# Patient Record
Sex: MALE | Race: WHITE | NOT HISPANIC OR LATINO | URBAN - METROPOLITAN AREA
[De-identification: names, ages, dates, MRNs, and addresses within clinical notes are randomized per-mention and may not be internally consistent; named-entity substitution may affect disease eponyms.]

---

## 2019-04-03 ENCOUNTER — OUTPATIENT (OUTPATIENT)
Dept: OUTPATIENT SERVICES | Facility: HOSPITAL | Age: 44
LOS: 1 days | End: 2019-04-03
Payer: MEDICARE

## 2019-04-03 ENCOUNTER — APPOINTMENT (OUTPATIENT)
Dept: RADIOLOGY | Facility: CLINIC | Age: 44
End: 2019-04-03
Payer: MEDICARE

## 2019-04-03 DIAGNOSIS — Z00.00 ENCOUNTER FOR GENERAL ADULT MEDICAL EXAMINATION WITHOUT ABNORMAL FINDINGS: ICD-10-CM

## 2019-04-03 PROCEDURE — 71046 X-RAY EXAM CHEST 2 VIEWS: CPT | Mod: 26

## 2019-04-03 PROCEDURE — 71046 X-RAY EXAM CHEST 2 VIEWS: CPT

## 2019-07-08 ENCOUNTER — OUTPATIENT (OUTPATIENT)
Dept: OUTPATIENT SERVICES | Facility: HOSPITAL | Age: 44
LOS: 1 days | End: 2019-07-08
Payer: MEDICARE

## 2019-07-08 ENCOUNTER — APPOINTMENT (OUTPATIENT)
Dept: ULTRASOUND IMAGING | Facility: CLINIC | Age: 44
End: 2019-07-08
Payer: MEDICARE

## 2019-07-08 DIAGNOSIS — Z00.8 ENCOUNTER FOR OTHER GENERAL EXAMINATION: ICD-10-CM

## 2019-07-08 PROCEDURE — 76870 US EXAM SCROTUM: CPT | Mod: 26

## 2019-07-08 PROCEDURE — 76870 US EXAM SCROTUM: CPT

## 2020-06-03 ENCOUNTER — APPOINTMENT (OUTPATIENT)
Dept: FAMILY MEDICINE | Facility: CLINIC | Age: 45
End: 2020-06-03
Payer: MEDICARE

## 2020-06-03 PROCEDURE — 99441: CPT | Mod: 95

## 2021-03-09 ENCOUNTER — APPOINTMENT (OUTPATIENT)
Dept: FAMILY MEDICINE | Facility: CLINIC | Age: 46
End: 2021-03-09
Payer: MEDICARE

## 2021-03-09 VITALS
TEMPERATURE: 97.8 F | HEIGHT: 67 IN | HEART RATE: 82 BPM | RESPIRATION RATE: 12 BRPM | BODY MASS INDEX: 38.61 KG/M2 | OXYGEN SATURATION: 98 % | WEIGHT: 246 LBS

## 2021-03-09 DIAGNOSIS — Z56.0 UNEMPLOYMENT, UNSPECIFIED: ICD-10-CM

## 2021-03-09 DIAGNOSIS — Z82.62 FAMILY HISTORY OF OSTEOPOROSIS: ICD-10-CM

## 2021-03-09 DIAGNOSIS — Z82.49 FAMILY HISTORY OF ISCHEMIC HEART DISEASE AND OTHER DISEASES OF THE CIRCULATORY SYSTEM: ICD-10-CM

## 2021-03-09 DIAGNOSIS — Z80.42 FAMILY HISTORY OF MALIGNANT NEOPLASM OF PROSTATE: ICD-10-CM

## 2021-03-09 DIAGNOSIS — Z81.8 FAMILY HISTORY OF OTHER MENTAL AND BEHAVIORAL DISORDERS: ICD-10-CM

## 2021-03-09 PROCEDURE — G0447 BEHAVIOR COUNSEL OBESITY 15M: CPT | Mod: 59

## 2021-03-09 PROCEDURE — 99214 OFFICE O/P EST MOD 30 MIN: CPT

## 2021-03-09 SDOH — ECONOMIC STABILITY - INCOME SECURITY: UNEMPLOYMENT, UNSPECIFIED: Z56.0

## 2021-03-10 NOTE — HEALTH RISK ASSESSMENT
[No] : In the past 12 months have you used drugs other than those required for medical reasons? No [No falls in past year] : Patient reported no falls in the past year [0] : 2) Feeling down, depressed, or hopeless: Not at all (0) [] : No [de-identified] : stopped 4 years ago [de-identified] : walking [de-identified] : no

## 2021-03-10 NOTE — ASSESSMENT
[FreeTextEntry1] : Hypertension:  Continue with Lisinopril 10 mg daily, monitor b/p, bring record.  \par \par Hyperlipidemia:  Continue with medications Lopid 600 mg twice/day.  Labs: lipid pane.\par \par DM:  Continue with Medications:  Janumet XR 50 mg-1000 mg, twice/day,  monitor BS, bring record.  \par \par Obesity::  Discussed diet and exercise,  discussed the risk of obesity and the benefits of weight lost.  Encouraged to eat smaller portion, 3-4 times/day, keep food diary, weight self weekly,  increase physical activities.\par \par HM:  Discussed Life style modification, healthy diet, low salts, fats, sweets, less juices/sodas, fried food, cheese, butter.  More water, fruits, vegetables.   discussed the risk of obesity and the benefits of weight lost.  Encouraged to eat smaller portion, 3-4 times/day, keep food diary, weight self weekly,  increase physical activities.  Exercise/walking, running 30-60 minutes/day.  Encouraged to get plenty of rest, sleep 8 hours/night.  Encouraged frequent hands washing, keep social distances, wear mask.   Discussed dental hygiene, brush/floss after each meals, dental check every 6 months.  Annual eye check.  Encouraged to follow up with specialist.  Labs requisition given for: cbc with diff, cmp, lipids, tsh free t4, hgbA1c.\par \par \par Depression/Anxiety:  Continue to f/u with psych/therapist, continue to

## 2021-03-10 NOTE — PLAN
[FreeTextEntry1] : Hypertension:  Continue with Lisinopril 10 mg daily, monitor b/p, bring record.  \par \par Hyperlipidemia:  Continue with medications Lopid 600 mg twice/day.  Labs: lipid pane.\par \par DM:  Continue with Medications:  Janumet XR 50 mg-1000 mg, twice/day,  monitor BS, bring record.  \par \par Obesity::  Discussed diet and exercise,  discussed the risk of obesity and the benefits of weight lost.  Encouraged to eat smaller portion, 3-4 times/day, keep food diary, weight self weekly,  increase physical activities.\par \par HM:  Discussed Life style modification, healthy diet, low salts, fats, sweets, less juices/sodas, fried food, cheese, butter.  More water, fruits, vegetables.   discussed the risk of obesity and the benefits of weight lost.  Encouraged to eat smaller portion, 3-4 times/day, keep food diary, weight self weekly,  increase physical activities.  Exercise/walking, running 30-60 minutes/day.  Encouraged to get plenty of rest, sleep 8 hours/night.  Encouraged frequent hands washing, keep social distances, wear mask.   Discussed dental hygiene, brush/floss after each meals, dental check every 6 months.  Annual eye check.  Encouraged to follow up with specialist.  Labs requisition given for: cbc with diff, cmp, lipids, tsh free t4, hgbA1c.\par \par \par Depression/Anxiety:  Continue to f/u with psych/therapist, continue with psych medications.\par \par F/U in 10-12 weeks or prn

## 2021-03-10 NOTE — COUNSELING
[Behavioral health counseling provided] : Behavioral health counseling provided [Sleep ___ hours/day] : Sleep [unfilled] hours/day [Engage in a relaxing activity] : Engage in a relaxing activity [Plan in advance] : Plan in advance [Potential consequences of obesity discussed] : Potential consequences of obesity discussed [Benefits of weight loss discussed] : Benefits of weight loss discussed [Structured Weight Management Program suggested:] : Structured weight management program suggested [Encouraged to maintain food diary] : Encouraged to maintain food diary [Encouraged to increase physical activity] : Encouraged to increase physical activity [Encouraged to use exercise tracking device] : Encouraged to use exercise tracking device [Target Wt Loss Goal ___] : Weight Loss Goals: Target weight loss goal [unfilled] lbs [Weigh Self Weekly] : weigh self weekly [Decrease Portions] : decrease portions [____ min/wk Activity] : [unfilled] min/wk activity [Keep Food Diary] : keep food diary [Patient motivation] : Patient motivation [Needs reinforcement, provided] : Patient needs reinforcement on understanding of lifestyle changes and steps needed to achieve self management goal; reinforcement was provided [FreeTextEntry4] : 15

## 2021-03-10 NOTE — REVIEW OF SYSTEMS
[Fever] : no fever [Chills] : no chills [Fatigue] : no fatigue [Hot Flashes] : no hot flashes [Night Sweats] : no night sweats [Recent Change In Weight] : ~T no recent weight change [Discharge] : no discharge [Pain] : no pain [Redness] : no redness [Dryness] : no dryness [Vision Problems] : no vision problems [Itching] : no itching [Earache] : no earache [Hearing Loss] : no hearing loss [Nosebleeds] : no nosebleeds [Postnasal Drip] : no postnasal drip [Nasal Discharge] : no nasal discharge [Sore Throat] : no sore throat [Hoarseness] : no hoarseness [Chest Pain] : no chest pain [Palpitations] : no palpitations [Claudication] : no  leg claudication [Lower Ext Edema] : no lower extremity edema [Orthopena] : no orthopnea [Paroxysmal Nocturnal Dyspnea] : no paroxysmal nocturnal dyspnea [Shortness Of Breath] : no shortness of breath [Wheezing] : no wheezing [Cough] : no cough [Dyspnea on Exertion] : not dyspnea on exertion [Abdominal Pain] : no abdominal pain [Nausea] : no nausea [Constipation] : no constipation [Diarrhea] : no diarrhea [Vomiting] : no vomiting [Heartburn] : no heartburn [Melena] : no melena [Dysuria] : no dysuria [Incontinence] : no incontinence [Hesitancy] : no hesitancy [Nocturia] : no nocturia [Hematuria] : no hematuria [Frequency] : no frequency [Impotence] : no impotency [Poor Libido] : libido not poor [Joint Pain] : no joint pain [Joint Stiffness] : no joint stiffness [Muscle Pain] : no muscle pain [Muscle Weakness] : no muscle weakness [Back Pain] : no back pain [Joint Swelling] : no joint swelling [Mole Changes] : no mole changes [Nail Changes] : no nail changes [Hair Changes] : no hair changes [Skin Rash] : no skin rash [Headache] : no headache [Dizziness] : no dizziness [Fainting] : no fainting [Confusion] : no confusion [Unsteady Walk] : no ataxia [Memory Loss] : no memory loss [Suicidal] : not suicidal [Insomnia] : no insomnia [Anxiety] : no anxiety [Depression] : no depression [Easy Bleeding] : no easy bleeding [Easy Bruising] : no easy bruising [Swollen Glands] : no swollen glands

## 2021-03-10 NOTE — HISTORY OF PRESENT ILLNESS
[FreeTextEntry1] : Follow up visit [de-identified] : 45 y.o male with PMHx of DM, dyslipidemia, HTN, bipolar disorder,  schizophrenia.  Here today for f/u visit and to discuss labs.  Reported felling fine at this time.  Taking medication everyday, took meds today.  Checking BS, ranging 110 to 160.  Not checking B/P.  Reported f/u with endocrinologist 3 months ago, next appoint 3/26/21.  Also f/u with psych once a month.  Denies cp, palpitation, sob, dizziness, edema.   Denies polyphagia, polyuria, polydipsia.

## 2021-07-27 ENCOUNTER — NON-APPOINTMENT (OUTPATIENT)
Age: 46
End: 2021-07-27

## 2022-04-07 ENCOUNTER — NON-APPOINTMENT (OUTPATIENT)
Age: 47
End: 2022-04-07

## 2022-08-08 ENCOUNTER — APPOINTMENT (OUTPATIENT)
Dept: FAMILY MEDICINE | Facility: CLINIC | Age: 47
End: 2022-08-08

## 2022-08-08 VITALS
DIASTOLIC BLOOD PRESSURE: 78 MMHG | WEIGHT: 254 LBS | RESPIRATION RATE: 12 BRPM | HEIGHT: 67 IN | OXYGEN SATURATION: 97 % | HEART RATE: 82 BPM | SYSTOLIC BLOOD PRESSURE: 132 MMHG | BODY MASS INDEX: 39.87 KG/M2 | TEMPERATURE: 98.2 F

## 2022-08-08 DIAGNOSIS — Z00.00 ENCOUNTER FOR GENERAL ADULT MEDICAL EXAMINATION W/OUT ABNORMAL FINDINGS: ICD-10-CM

## 2022-08-08 PROCEDURE — 99214 OFFICE O/P EST MOD 30 MIN: CPT | Mod: 25

## 2022-08-08 PROCEDURE — G0447 BEHAVIOR COUNSEL OBESITY 15M: CPT | Mod: 59

## 2022-08-09 NOTE — HISTORY OF PRESENT ILLNESS
[FreeTextEntry1] : Follow up visit [de-identified] : 47 y.o male with PMHx of DM, dyslipidemia, HTN, bipolar disorder,  schizophrenia.  Here today for f/u visit.  Reported having lower back pain for several months, worse lately, also having left shoulder pain.  Pain rating 3-6/10, worse after lifting and carrying grocery from the market and walk for 45-50 minutes.  Reported not taking any meds for the pain at this time.  Reported Taking medication everyday, took meds today.  Checking BS, ranging 120 to 200.  Not checking B/P.  Reported f/u with endocrinologist 4-5 months ago, next appoint tomorrow 8/9/22.  Also f/u with psych once a month.  Denies cp, palpitation, sob, dizziness, edema.   Denies polyphagia, polyuria, polydipsia.

## 2022-08-09 NOTE — PLAN
[FreeTextEntry1] : Lower back pain:  Ibuprofen 600 mg every 8 hours prn.  Encouraged to alternate heating pads and cold compress 20 minutes each, 2-3 times/day. Encouraged Joints/back exercise 1-2 times/day.\par \par Hypertension:  Continue with Lisinopril 10 mg daily, monitor b/p, bring record.  \par \par Hyperlipidemia:  Continue with medications Lopid 600 mg twice/day.  Labs: lipid pane.\par \par DM:  Continue with Medications:  Janumet XR 50 mg-1000 mg, twice/day,  monitor BS, bring record.  \par \par Obesity::  Discussed diet and exercise,  discussed the risk of obesity and the benefits of weight lost.  Encouraged to eat smaller portion, 3-4 times/day, keep food diary, weight self weekly,  increase physical activities.\par \par HM:  Discussed Life style modification, healthy diet, low salts, fats, sweets, less juices/sodas, fried food, cheese, butter.  More water, fruits, vegetables.   discussed the risk of obesity and the benefits of weight lost.  Encouraged to eat smaller portion, 3-4 times/day, keep food diary, weight self weekly,  increase physical activities.  Exercise/walking, running 30-60 minutes/day.  Encouraged to get plenty of rest, sleep 8 hours/night.  Encouraged frequent hands washing, keep social distances, wear mask.   Discussed dental hygiene, brush/floss after each meals, dental check every 6 months.  Annual eye check.  Encouraged to follow up with specialist.  Labs requisition given for: cbc with diff, cmp, lipids, tsh free t4, hgbA1c.\par \par Depression/Anxiety:  Continue to f/u with psych/therapist, continue with psych medications.\par \par F/U in 10-12 weeks or prn

## 2022-08-09 NOTE — ASSESSMENT
[FreeTextEntry1] : Lower back and shoulder pain:  Ibuprofen 600 mg every 8 hours prn, Encouraged to alternate heating pads and cold compress 20 minutes each, 2-3 times/day. Encouraged Joints exercise 1-2 times/day.\par \par Hypertension:  Continue with Lisinopril 10 mg daily, monitor b/p, bring record.  \par \par Hyperlipidemia:  Continue with medications Lopid 600 mg twice/day.  Labs: lipid pane.\par \par DM:  Continue with Medications:  Janumet XR 50 mg-1000 mg, twice/day,  monitor BS, bring record.  \par \par Obesity::  Discussed diet and exercise,  discussed the risk of obesity and the benefits of weight lost.  Encouraged to eat smaller portion, 3-4 times/day, keep food diary, weight self weekly,  increase physical activities.\par \par HM:  Discussed Life style modification, healthy diet, low salts, fats, sweets, less juices/sodas, fried food, cheese, butter.  More water, fruits, vegetables.   discussed the risk of obesity and the benefits of weight lost.  Encouraged to eat smaller portion, 3-4 times/day, keep food diary, weight self weekly,  increase physical activities.  Exercise/walking, running 30-60 minutes/day.  Encouraged to get plenty of rest, sleep 8 hours/night.  Encouraged frequent hands washing, keep social distances, wear mask.   Discussed dental hygiene, brush/floss after each meals, dental check every 6 months.  Annual eye check.  Encouraged to follow up with specialist.  Labs requisition given for: cbc with diff, cmp, lipids, tsh free t4, hgbA1c.\par \par \par Depression/Anxiety:  Continue to f/u with psych/therapist, continue with medications.\par \par Follow up in 12-14 weeks or prn.\par

## 2022-08-09 NOTE — HEALTH RISK ASSESSMENT
[No] : In the past 12 months have you used drugs other than those required for medical reasons? No [No falls in past year] : Patient reported no falls in the past year [0] : 2) Feeling down, depressed, or hopeless: Not at all (0) [With Patient/Caregiver] : , with patient/caregiver [de-identified] : stopped 4 years ago [de-identified] : walking [de-identified] : no [AdvancecareDate] : 8/8/22

## 2023-05-11 ENCOUNTER — NON-APPOINTMENT (OUTPATIENT)
Age: 48
End: 2023-05-11

## 2023-05-11 ENCOUNTER — APPOINTMENT (OUTPATIENT)
Dept: FAMILY MEDICINE | Facility: CLINIC | Age: 48
End: 2023-05-11
Payer: MEDICARE

## 2023-05-11 VITALS
OXYGEN SATURATION: 95 % | HEIGHT: 67 IN | TEMPERATURE: 98 F | DIASTOLIC BLOOD PRESSURE: 88 MMHG | BODY MASS INDEX: 39.87 KG/M2 | WEIGHT: 254 LBS | RESPIRATION RATE: 14 BRPM | HEART RATE: 84 BPM | SYSTOLIC BLOOD PRESSURE: 142 MMHG

## 2023-05-11 DIAGNOSIS — F31.10 BIPOLAR DISORDER, CURRENT EPISODE MANIC W/OUT PSYCHOTIC FEATURES, UNSPECIFIED: ICD-10-CM

## 2023-05-11 DIAGNOSIS — L30.8 OTHER SPECIFIED DERMATITIS: ICD-10-CM

## 2023-05-11 DIAGNOSIS — Z86.79 PERSONAL HISTORY OF OTHER DISEASES OF THE CIRCULATORY SYSTEM: ICD-10-CM

## 2023-05-11 DIAGNOSIS — Z86.39 PERSONAL HISTORY OF OTHER ENDOCRINE, NUTRITIONAL AND METABOLIC DISEASE: ICD-10-CM

## 2023-05-11 PROCEDURE — G0439: CPT

## 2023-05-11 NOTE — REVIEW OF SYSTEMS
no [Diarrhea] : diarrhea [Heartburn] : heartburn [Back Pain] : back pain [Negative] : Heme/Lymph [Abdominal Pain] : no abdominal pain [Nausea] : no nausea [Constipation] : no constipation [Vomiting] : no vomiting [Melena] : no melena [Joint Pain] : no joint pain [Joint Stiffness] : no joint stiffness [Muscle Pain] : no muscle pain [Muscle Weakness] : no muscle weakness [Joint Swelling] : no joint swelling [Itching] : no itching [Mole Changes] : no mole changes [Nail Changes] : no nail changes [Hair Changes] : no hair changes [Skin Rash] : no skin rash [FreeTextEntry3] : wears glasses [de-identified] : stretch marks and chafing to inner thighs

## 2023-05-11 NOTE — COUNSELING
[Fall prevention counseling provided] : Fall prevention counseling provided [Adequate lighting] : Adequate lighting [No throw rugs] : No throw rugs [Use proper foot wear] : Use proper foot wear [Behavioral health counseling provided] : Behavioral health counseling provided [Sleep ___ hours/day] : Sleep [unfilled] hours/day [Engage in a relaxing activity] : Engage in a relaxing activity [Plan in advance] : Plan in advance [Potential consequences of obesity discussed] : Potential consequences of obesity discussed [Benefits of weight loss discussed] : Benefits of weight loss discussed [Encouraged to maintain food diary] : Encouraged to maintain food diary [Encouraged to increase physical activity] : Encouraged to increase physical activity [Target Wt Loss Goal ___] : Weight Loss Goals: Target weight loss goal [unfilled] lbs [Weigh Self Weekly] : weigh self weekly [Decrease Portions] : decrease portions [____ min/wk Activity] : [unfilled] min/wk activity [Patient motivation] : Patient motivation [Good understanding] : Patient has a good understanding of lifestyle changes and steps needed to achieve self management goal

## 2023-05-11 NOTE — PHYSICAL EXAM
[Normal] : affect was normal and insight and judgment were intact [de-identified] : obese abdomen [de-identified] : Striae noted to inner thigh w/redness, mild moisture associated dermatitius noted

## 2023-05-11 NOTE — HISTORY OF PRESENT ILLNESS
[FreeTextEntry1] : Physical Exam and Med Refill [de-identified] : 47 y.o male with PMHx of DM, dyslipidemia, HTN, bipolar disorder, schizophrenia and obesity presents to clinic for annual physical exam and medication refill. Pt does not recall last physical and states he ran out of med about 2-3 months ago. [facility] Endocrinologist supplied 30 day supply but that ran out about 2 months ago and he been w/o since. Denies any HA, dizziness, cp, n/v, fatigue, visual disturbances.\par Pt concerned about irritation to stretch marks to inner thigh near groin area as he is experiencing discomfort and is afraid it will bleed again. States that previous provider, ISADORA An prescribed Lotrimin ointment and would like rx. Denies any active bleed, skin breakdown, itching, odor, blistering, sores.\[facility] Pt also reports he is still experiencing loose stool and urgency upon arousal w/epigastric pain to lt upper abdomen. Denies heart burn, cp, palpitations, belching, gas pain, intolerance of food, flank pain, bloody/black/tarry stool, incontinence of stool/urine.  Pt state she chews about 20 pieces of Nicotine gum/day and eats at night prior to bed-usually peanut butter and jelly sandwich and beef kerry or cheerios/grain cereal. States this has been occurring for about 3 years. Reports he believes it is r/t hernia repair near groin as they cut rt testicle as he also is experiencing ED. Pt also reports occasional sensation of pulled and strain to groin area and believes occasional ED is r/t to use of Cogentin and Prolixin prescribed for Tremors in which he notified psych.\[facility] Pt is also c/o lower back pain occasionally, denies at this time. Rufino any numbness, tingling, muscle spasms/cramping, inability to bear full wt on extremity. States he has no Leobardo left nor takes anything OTC, did not use heat or cold therapy nor perform stretching exercises advised by ISADORA An. States it occurs when walking long periods of time, over 3 hours. Reports he does not alternate running shoes daily but has several pairs available. States mattress is about 3 yrs old.\par Denies any further concerns or requests.

## 2023-05-11 NOTE — HEALTH RISK ASSESSMENT
[Good] : ~his/her~  mood as  good [No] : In the past 12 months have you used drugs other than those required for medical reasons? No [No falls in past year] : Patient reported no falls in the past year [0] : 2) Feeling down, depressed, or hopeless: Not at all (0) [PHQ-2 Negative - No further assessment needed] : PHQ-2 Negative - No further assessment needed [Patient declined Retinal Exam] : Patient declined Retinal Exam. [Patient declined bone density test] : Patient declined bone density test [Patient declined colonoscopy] : Patient declined colonoscopy [HIV Test offered] : HIV Test offered [Hepatitis C test offered] : Hepatitis C test offered [None] : None [# of Members in Household ___] :  household currently consist of [unfilled] member(s) [On disability] : on disability [High School] : high school [Single] : single [Feels Safe at Home] : Feels safe at home [Fully functional (bathing, dressing, toileting, transferring, walking, feeding)] : Fully functional (bathing, dressing, toileting, transferring, walking, feeding) [Fully functional (using the telephone, shopping, preparing meals, housekeeping, doing laundry, using] : Fully functional and needs no help or supervision to perform IADLs (using the telephone, shopping, preparing meals, housekeeping, doing laundry, using transportation, managing medications and managing finances) [Smoke Detector] : smoke detector [Seat Belt] :  uses seat belt [Sunscreen] : uses sunscreen [Patient/Caregiver unclear of wishes] : , patient/caregiver unclear of wishes [Former] : Former [20 or more] : 20 or more [< 15 Years] : < 15 Years [FreeTextEntry1] : Stretch Marks, Lower Back Pain, Loose Stool upon Arousal [de-identified] : None [de-identified] : Therapist, Endocrine, Podiatry [de-identified] : Walk, Bike Ride [de-identified] : Not that Great-Bread, Canned Food, Carbs [[id number]] : 0 [Change in mental status noted] : No change in mental status noted [Language] : denies difficulty with language [Behavior] : denies difficulty with behavior [Learning/Retaining New Information] : denies difficulty learning/retaining new information [Handling Complex Tasks] : denies difficulty handling complex tasks [Reasoning] : denies difficulty with reasoning [Spatial Ability and Orientation] : denies difficulty with spatial ability and orientation [Sexually Active] : not sexually active [High Risk Behavior] : no high risk behavior [Reports changes in hearing] : Reports no changes in hearing [Reports changes in vision] : Reports no changes in vision [Reports normal functional visual acuity (ie: able to read med bottle)] : Reports poor functional visual acuity.  [Reports changes in dental health] : Reports no changes in dental health [Carbon Monoxide Detector] : no carbon monoxide detector [Guns at Home] : no guns at home [Travel to Developing Areas] : does not  travel to developing areas [TB Exposure] : is not being exposed to tuberculosis [Caregiver Concerns] : does not have caregiver concerns [MammogramComments] : male pt [PapSmearComments] : male pt [ColonoscopyComments] : will consider, referral for GI [de-identified] : Supportive Housing-roommates [de-identified] : GED [de-identified] : Wears glasses,last exam 6/2022

## 2023-05-11 NOTE — PLAN
[FreeTextEntry1] : CPE: Done\par \par Labs: Ordered and lab requisition provided-will review when results available\par \par Meds: Refills sent; pt instructed to contact clinic when running low to ensure proper refill and avoid lapse-continue as directed\par \par Moisture Associated Dermatitis: Cleanse area and pat dry, Apply Nystatin BID as needed; keep cool and Dry\par \par Loose Stool/Epigastric Pain: Referral for GI placed and list provided; advised to decrease gum throughout the day; Discussed alternate supper options; Discussed s/s of emergent medical issues-pt aware when to go to ED\par \par Low Back Pain: Take Motrin prn as directed; Alternated sneakers daily; Discussed resting in btw activities; Incorporate stretching exercises; alternate heat and cold as tolerated\par \par ED/testicle Strain sensation: Referral to Urology ordered and list provided; discussed when to go to ED; labs ordered for PSA and testosterone; discuss w/psych med s/e\par \par Psych: Continue meds nad session as scheduled\par \par H/M; Wt loss discussed; Continue appt w/specialists as scheduled; Schedule vision and dentist appt\par \par F/u w/in 12-16 wk  and prn\par \par \par

## 2023-06-01 ENCOUNTER — NON-APPOINTMENT (OUTPATIENT)
Age: 48
End: 2023-06-01

## 2023-06-05 ENCOUNTER — APPOINTMENT (OUTPATIENT)
Dept: UROLOGY | Facility: CLINIC | Age: 48
End: 2023-06-05
Payer: MEDICARE

## 2023-06-05 VITALS
SYSTOLIC BLOOD PRESSURE: 149 MMHG | TEMPERATURE: 98.1 F | WEIGHT: 250 LBS | HEIGHT: 67 IN | HEART RATE: 63 BPM | BODY MASS INDEX: 39.24 KG/M2 | OXYGEN SATURATION: 98 % | RESPIRATION RATE: 16 BRPM | DIASTOLIC BLOOD PRESSURE: 84 MMHG

## 2023-06-05 DIAGNOSIS — R68.82 DECREASED LIBIDO: ICD-10-CM

## 2023-06-05 DIAGNOSIS — Z87.891 PERSONAL HISTORY OF NICOTINE DEPENDENCE: ICD-10-CM

## 2023-06-05 PROCEDURE — 99204 OFFICE O/P NEW MOD 45 MIN: CPT

## 2023-06-05 RX ORDER — LISINOPRIL 10 MG/1
10 TABLET ORAL
Refills: 0 | Status: DISCONTINUED | COMMUNITY
End: 2023-06-05

## 2023-06-05 RX ORDER — GEMFIBROZIL 600 MG/1
600 TABLET, FILM COATED ORAL
Refills: 0 | Status: DISCONTINUED | COMMUNITY
End: 2023-06-05

## 2023-06-05 NOTE — LETTER BODY
[Dear  ___] : Dear  [unfilled], [Courtesy Letter:] : I had the pleasure of seeing your patient, [unfilled], in my office today. [Please see my note below.] : Please see my note below. [Sincerely,] : Sincerely, [FreeTextEntry3] : Ed\par \par César Wasserman MD\par [facility]\par  of Urology\par [employer] at [facility]\par

## 2023-06-05 NOTE — PHYSICAL EXAM
[General Appearance - Well Developed] : well developed [General Appearance - Well Nourished] : well nourished [Normal Appearance] : normal appearance [Well Groomed] : well groomed [General Appearance - In No Acute Distress] : no acute distress [Edema] : no peripheral edema [Respiration, Rhythm And Depth] : normal respiratory rhythm and effort [Exaggerated Use Of Accessory Muscles For Inspiration] : no accessory muscle use [Abdomen Soft] : soft [Abdomen Tenderness] : non-tender [Costovertebral Angle Tenderness] : no ~M costovertebral angle tenderness [Urethral Meatus] : meatus normal [Penis Abnormality] : normal uncircumcised penis [Urinary Bladder Findings] : the bladder was normal on palpation [Scrotum] : the scrotum was normal [Epididymis] : the epididymides were normal [Testes Tenderness] : no tenderness of the testes [Testes Mass (___cm)] : there were no testicular masses [Normal Station and Gait] : the gait and station were normal for the patient's age [] : no rash [No Focal Deficits] : no focal deficits [Oriented To Time, Place, And Person] : oriented to person, place, and time [Affect] : the affect was normal [Mood] : the mood was normal [Not Anxious] : not anxious [Inguinal Lymph Nodes Enlarged Bilaterally] : inguinal [FreeTextEntry1] : left testicle seems larger.

## 2023-06-05 NOTE — HISTORY OF PRESENT ILLNESS
[FreeTextEntry1] : right scrotal pain, no enlargement. It started about three years ago.  No initiating factor identified. No improving or relieving factors noted.  It has progressively worsened over the last few years. he states he has frequency but he has a lot of water intake. no dysuria or hematuria.  \par \par The patient has also described. decreased libido and decrease erections since getting placed on Cogentin and  prolixin.  No recent labs. \par \par \par

## 2023-06-05 NOTE — ASSESSMENT
[FreeTextEntry1] : Right testicle pain\par previous surgery to remove infected scrrotal tissue\par \par PlanL:\par \par testicular ultrasound\par Followup after. \par

## 2023-06-08 ENCOUNTER — APPOINTMENT (OUTPATIENT)
Dept: FAMILY MEDICINE | Facility: CLINIC | Age: 48
End: 2023-06-08
Payer: MEDICARE

## 2023-06-08 ENCOUNTER — NON-APPOINTMENT (OUTPATIENT)
Age: 48
End: 2023-06-08

## 2023-06-08 PROCEDURE — 99212 OFFICE O/P EST SF 10 MIN: CPT | Mod: GE

## 2023-06-12 ENCOUNTER — LABORATORY RESULT (OUTPATIENT)
Age: 48
End: 2023-06-12

## 2023-06-13 ENCOUNTER — OUTPATIENT (OUTPATIENT)
Dept: OUTPATIENT SERVICES | Facility: HOSPITAL | Age: 48
LOS: 1 days | End: 2023-06-13

## 2023-06-13 ENCOUNTER — APPOINTMENT (OUTPATIENT)
Dept: ULTRASOUND IMAGING | Facility: CLINIC | Age: 48
End: 2023-06-13
Payer: MEDICARE

## 2023-06-13 DIAGNOSIS — N50.819 TESTICULAR PAIN, UNSPECIFIED: ICD-10-CM

## 2023-06-13 LAB
FSH SERPL-MCNC: 3.1 IU/L
LH SERPL-ACNC: 3.9 IU/L
PROLACTIN SERPL-MCNC: 10.4 NG/ML

## 2023-06-13 PROCEDURE — 76870 US EXAM SCROTUM: CPT | Mod: 26

## 2023-06-14 LAB
APPEARANCE: CLEAR
BILIRUBIN URINE: NEGATIVE
BLOOD URINE: ABNORMAL
COLOR: YELLOW
GLUCOSE QUALITATIVE U: NEGATIVE MG/DL
KETONES URINE: NEGATIVE MG/DL
LEUKOCYTE ESTERASE URINE: NEGATIVE
NITRITE URINE: NEGATIVE
PH URINE: 6.5
PROTEIN URINE: NORMAL MG/DL
SPECIFIC GRAVITY URINE: 1.02
TESTOST FREE SERPL-MCNC: 12 PG/ML
TESTOST SERPL-MCNC: 189 NG/DL
UROBILINOGEN URINE: 0.2 MG/DL

## 2023-06-19 ENCOUNTER — APPOINTMENT (OUTPATIENT)
Dept: UROLOGY | Facility: CLINIC | Age: 48
End: 2023-06-19
Payer: MEDICARE

## 2023-06-19 VITALS — OXYGEN SATURATION: 97 % | DIASTOLIC BLOOD PRESSURE: 89 MMHG | HEART RATE: 87 BPM | SYSTOLIC BLOOD PRESSURE: 142 MMHG

## 2023-06-19 DIAGNOSIS — N50.819 TESTICULAR PAIN, UNSPECIFIED: ICD-10-CM

## 2023-06-19 PROCEDURE — 99213 OFFICE O/P EST LOW 20 MIN: CPT

## 2023-06-19 NOTE — ASSESSMENT
[FreeTextEntry1] : Impression:\par \par scrotal apin, resolved\par US negative\par \par Plan:"\par follow up PRN

## 2023-06-19 NOTE — HISTORY OF PRESENT ILLNESS
[FreeTextEntry1] : he had testicle [pain.  The patient had right sided pain as well. now it is nearly completely resolved.  no scrotal swelling.  No voiding issues. no furhter flank pain.

## 2023-06-19 NOTE — LETTER BODY
[Dear  ___] : Dear  [unfilled], [Courtesy Letter:] : I had the pleasure of seeing your patient, [unfilled], in my office today. [Please see my note below.] : Please see my note below. [Sincerely,] : Sincerely, [FreeTextEntry3] : Ed\par \par César Wasserman MD\par [facility]\par  of Urology\par [employer] at [facility]\par

## 2023-06-19 NOTE — PHYSICAL EXAM
[General Appearance - Well Developed] : well developed [General Appearance - Well Nourished] : well nourished [Normal Appearance] : normal appearance [Well Groomed] : well groomed [General Appearance - In No Acute Distress] : no acute distress [Edema] : no peripheral edema [] : no respiratory distress [Respiration, Rhythm And Depth] : normal respiratory rhythm and effort [Exaggerated Use Of Accessory Muscles For Inspiration] : no accessory muscle use [Oriented To Time, Place, And Person] : oriented to person, place, and time [Affect] : the affect was normal [Not Anxious] : not anxious [Mood] : the mood was normal [Normal Station and Gait] : the gait and station were normal for the patient's age

## 2023-07-24 ENCOUNTER — APPOINTMENT (OUTPATIENT)
Dept: UROLOGY | Facility: CLINIC | Age: 48
End: 2023-07-24
Payer: MEDICARE

## 2023-07-24 VITALS — SYSTOLIC BLOOD PRESSURE: 158 MMHG | DIASTOLIC BLOOD PRESSURE: 92 MMHG

## 2023-07-24 PROCEDURE — 99213 OFFICE O/P EST LOW 20 MIN: CPT

## 2023-07-24 NOTE — ASSESSMENT
[FreeTextEntry1] : Impression:\par \par low testosterone.  He does not want treatment. \par \par Plan:\par recommended every other year bone density by primary. \par Follow up PRN with us.  \par

## 2023-07-24 NOTE — LETTER BODY
[Dear  ___] : Dear  [unfilled], [Courtesy Letter:] : I had the pleasure of seeing your patient, [unfilled], in my office today. [Please see my note below.] : Please see my note below. [Sincerely,] : Sincerely, [FreeTextEntry3] : Ed\par \par César Wasserman MD\par [facility]\par  of Urology\par [employer] [facility]\par

## 2023-07-24 NOTE — HISTORY OF PRESENT ILLNESS
[FreeTextEntry1] : he has a low testosterone and presents for serum level.  He was placed on Prolixin and Cogentin.  Since then he has not noticed any significant erections or at least not able to keep[ erections. He has not noticed any significant erection levels.  He walks daily.

## 2023-09-13 ENCOUNTER — APPOINTMENT (OUTPATIENT)
Dept: FAMILY MEDICINE | Facility: CLINIC | Age: 48
End: 2023-09-13
Payer: MEDICARE

## 2023-09-13 VITALS
OXYGEN SATURATION: 95 % | DIASTOLIC BLOOD PRESSURE: 84 MMHG | HEART RATE: 74 BPM | HEIGHT: 67 IN | WEIGHT: 250 LBS | BODY MASS INDEX: 39.24 KG/M2 | SYSTOLIC BLOOD PRESSURE: 128 MMHG | RESPIRATION RATE: 16 BRPM | TEMPERATURE: 97.8 F

## 2023-09-13 DIAGNOSIS — H91.92 UNSPECIFIED HEARING LOSS, LEFT EAR: ICD-10-CM

## 2023-09-13 DIAGNOSIS — E66.9 OBESITY, UNSPECIFIED: ICD-10-CM

## 2023-09-13 DIAGNOSIS — H61.23 IMPACTED CERUMEN, BILATERAL: ICD-10-CM

## 2023-09-13 PROCEDURE — 99214 OFFICE O/P EST MOD 30 MIN: CPT | Mod: 25

## 2023-09-13 PROCEDURE — 99406 BEHAV CHNG SMOKING 3-10 MIN: CPT

## 2023-09-13 RX ORDER — GEMFIBROZIL 600 MG/1
600 TABLET, FILM COATED ORAL TWICE DAILY
Qty: 180 | Refills: 1 | Status: DISCONTINUED | COMMUNITY
Start: 2023-05-11 | End: 2023-09-13

## 2023-11-13 ENCOUNTER — EMERGENCY (EMERGENCY)
Facility: HOSPITAL | Age: 48
LOS: 1 days | Discharge: HOME | End: 2023-11-13
Attending: EMERGENCY MEDICINE
Payer: MEDICARE

## 2023-11-13 VITALS
HEART RATE: 107 BPM | HEIGHT: 68 IN | WEIGHT: 245.37 LBS | TEMPERATURE: 98 F | RESPIRATION RATE: 20 BRPM | DIASTOLIC BLOOD PRESSURE: 91 MMHG | SYSTOLIC BLOOD PRESSURE: 150 MMHG | OXYGEN SATURATION: 98 %

## 2023-11-13 LAB
AMPHET UR-MCNC: NEGATIVE — SIGNIFICANT CHANGE UP
AMPHET UR-MCNC: NEGATIVE — SIGNIFICANT CHANGE UP
ANION GAP SERPL CALC-SCNC: 16 MMOL/L — SIGNIFICANT CHANGE UP (ref 5–17)
ANION GAP SERPL CALC-SCNC: 16 MMOL/L — SIGNIFICANT CHANGE UP (ref 5–17)
APAP SERPL-MCNC: <3 UG/ML — LOW (ref 10–26)
APAP SERPL-MCNC: <3 UG/ML — LOW (ref 10–26)
APPEARANCE UR: CLEAR — SIGNIFICANT CHANGE UP
APPEARANCE UR: CLEAR — SIGNIFICANT CHANGE UP
BACTERIA # UR AUTO: NEGATIVE /HPF — SIGNIFICANT CHANGE UP
BACTERIA # UR AUTO: NEGATIVE /HPF — SIGNIFICANT CHANGE UP
BARBITURATES UR SCN-MCNC: NEGATIVE — SIGNIFICANT CHANGE UP
BARBITURATES UR SCN-MCNC: NEGATIVE — SIGNIFICANT CHANGE UP
BASE EXCESS BLDV CALC-SCNC: 0.6 MMOL/L — SIGNIFICANT CHANGE UP (ref -2–3)
BASE EXCESS BLDV CALC-SCNC: 0.6 MMOL/L — SIGNIFICANT CHANGE UP (ref -2–3)
BASOPHILS # BLD AUTO: 0.02 K/UL — SIGNIFICANT CHANGE UP (ref 0–0.2)
BASOPHILS NFR BLD AUTO: 0.2 % — SIGNIFICANT CHANGE UP (ref 0–2)
BENZODIAZ UR-MCNC: NEGATIVE — SIGNIFICANT CHANGE UP
BENZODIAZ UR-MCNC: NEGATIVE — SIGNIFICANT CHANGE UP
BILIRUB UR-MCNC: NEGATIVE — SIGNIFICANT CHANGE UP
BILIRUB UR-MCNC: NEGATIVE — SIGNIFICANT CHANGE UP
BUN SERPL-MCNC: 10.1 MG/DL — SIGNIFICANT CHANGE UP (ref 8–20)
BUN SERPL-MCNC: 10.1 MG/DL — SIGNIFICANT CHANGE UP (ref 8–20)
CA-I SERPL-SCNC: 1.23 MMOL/L — SIGNIFICANT CHANGE UP (ref 1.15–1.33)
CA-I SERPL-SCNC: 1.23 MMOL/L — SIGNIFICANT CHANGE UP (ref 1.15–1.33)
CALCIUM SERPL-MCNC: 9.5 MG/DL — SIGNIFICANT CHANGE UP (ref 8.4–10.5)
CALCIUM SERPL-MCNC: 9.5 MG/DL — SIGNIFICANT CHANGE UP (ref 8.4–10.5)
CAST: 0 /LPF — SIGNIFICANT CHANGE UP (ref 0–4)
CAST: 0 /LPF — SIGNIFICANT CHANGE UP (ref 0–4)
CHLORIDE BLDV-SCNC: 100 MMOL/L — SIGNIFICANT CHANGE UP (ref 96–108)
CHLORIDE BLDV-SCNC: 100 MMOL/L — SIGNIFICANT CHANGE UP (ref 96–108)
CHLORIDE SERPL-SCNC: 99 MMOL/L — SIGNIFICANT CHANGE UP (ref 96–108)
CHLORIDE SERPL-SCNC: 99 MMOL/L — SIGNIFICANT CHANGE UP (ref 96–108)
CO2 SERPL-SCNC: 22 MMOL/L — SIGNIFICANT CHANGE UP (ref 22–29)
CO2 SERPL-SCNC: 22 MMOL/L — SIGNIFICANT CHANGE UP (ref 22–29)
COCAINE METAB.OTHER UR-MCNC: NEGATIVE — SIGNIFICANT CHANGE UP
COCAINE METAB.OTHER UR-MCNC: NEGATIVE — SIGNIFICANT CHANGE UP
COLOR SPEC: YELLOW — SIGNIFICANT CHANGE UP
COLOR SPEC: YELLOW — SIGNIFICANT CHANGE UP
CREAT SERPL-MCNC: 0.85 MG/DL — SIGNIFICANT CHANGE UP (ref 0.5–1.3)
CREAT SERPL-MCNC: 0.85 MG/DL — SIGNIFICANT CHANGE UP (ref 0.5–1.3)
DIFF PNL FLD: ABNORMAL
DIFF PNL FLD: ABNORMAL
EGFR: 109 ML/MIN/1.73M2 — SIGNIFICANT CHANGE UP
EGFR: 109 ML/MIN/1.73M2 — SIGNIFICANT CHANGE UP
EOSINOPHIL # BLD AUTO: 0.32 K/UL — SIGNIFICANT CHANGE UP (ref 0–0.5)
EOSINOPHIL # BLD AUTO: 0.32 K/UL — SIGNIFICANT CHANGE UP (ref 0–0.5)
EOSINOPHIL # BLD AUTO: 0.4 K/UL — SIGNIFICANT CHANGE UP (ref 0–0.5)
EOSINOPHIL # BLD AUTO: 0.4 K/UL — SIGNIFICANT CHANGE UP (ref 0–0.5)
EOSINOPHIL NFR BLD AUTO: 2.9 % — SIGNIFICANT CHANGE UP (ref 0–6)
EOSINOPHIL NFR BLD AUTO: 2.9 % — SIGNIFICANT CHANGE UP (ref 0–6)
EOSINOPHIL NFR BLD AUTO: 3.4 % — SIGNIFICANT CHANGE UP (ref 0–6)
EOSINOPHIL NFR BLD AUTO: 3.4 % — SIGNIFICANT CHANGE UP (ref 0–6)
ETHANOL SERPL-MCNC: <10 MG/DL — SIGNIFICANT CHANGE UP (ref 0–9)
ETHANOL SERPL-MCNC: <10 MG/DL — SIGNIFICANT CHANGE UP (ref 0–9)
FLUAV AG NPH QL: SIGNIFICANT CHANGE UP
FLUAV AG NPH QL: SIGNIFICANT CHANGE UP
FLUBV AG NPH QL: SIGNIFICANT CHANGE UP
FLUBV AG NPH QL: SIGNIFICANT CHANGE UP
GAS PNL BLDV: 133 MMOL/L — LOW (ref 136–145)
GAS PNL BLDV: 133 MMOL/L — LOW (ref 136–145)
GAS PNL BLDV: SIGNIFICANT CHANGE UP
GLUCOSE BLDV-MCNC: 130 MG/DL — HIGH (ref 70–99)
GLUCOSE BLDV-MCNC: 130 MG/DL — HIGH (ref 70–99)
GLUCOSE SERPL-MCNC: 192 MG/DL — HIGH (ref 70–99)
GLUCOSE SERPL-MCNC: 192 MG/DL — HIGH (ref 70–99)
GLUCOSE UR QL: 250 MG/DL
GLUCOSE UR QL: 250 MG/DL
HCO3 BLDV-SCNC: 25 MMOL/L — SIGNIFICANT CHANGE UP (ref 22–29)
HCO3 BLDV-SCNC: 25 MMOL/L — SIGNIFICANT CHANGE UP (ref 22–29)
HCT VFR BLD CALC: 42.1 % — SIGNIFICANT CHANGE UP (ref 39–50)
HCT VFR BLD CALC: 42.1 % — SIGNIFICANT CHANGE UP (ref 39–50)
HCT VFR BLD CALC: 43 % — SIGNIFICANT CHANGE UP (ref 39–50)
HCT VFR BLD CALC: 43 % — SIGNIFICANT CHANGE UP (ref 39–50)
HCT VFR BLDA CALC: 47 % — SIGNIFICANT CHANGE UP
HCT VFR BLDA CALC: 47 % — SIGNIFICANT CHANGE UP
HGB BLD CALC-MCNC: 15.6 G/DL — SIGNIFICANT CHANGE UP (ref 12.6–17.4)
HGB BLD CALC-MCNC: 15.6 G/DL — SIGNIFICANT CHANGE UP (ref 12.6–17.4)
HGB BLD-MCNC: 14.3 G/DL — SIGNIFICANT CHANGE UP (ref 13–17)
HGB BLD-MCNC: 14.3 G/DL — SIGNIFICANT CHANGE UP (ref 13–17)
HGB BLD-MCNC: 15.3 G/DL — SIGNIFICANT CHANGE UP (ref 13–17)
HGB BLD-MCNC: 15.3 G/DL — SIGNIFICANT CHANGE UP (ref 13–17)
IMM GRANULOCYTES NFR BLD AUTO: 0.3 % — SIGNIFICANT CHANGE UP (ref 0–0.9)
IMM GRANULOCYTES NFR BLD AUTO: 0.3 % — SIGNIFICANT CHANGE UP (ref 0–0.9)
IMM GRANULOCYTES NFR BLD AUTO: 0.4 % — SIGNIFICANT CHANGE UP (ref 0–0.9)
IMM GRANULOCYTES NFR BLD AUTO: 0.4 % — SIGNIFICANT CHANGE UP (ref 0–0.9)
KETONES UR-MCNC: ABNORMAL MG/DL
KETONES UR-MCNC: ABNORMAL MG/DL
LACTATE BLDV-MCNC: 2 MMOL/L — SIGNIFICANT CHANGE UP (ref 0.5–2)
LACTATE BLDV-MCNC: 2 MMOL/L — SIGNIFICANT CHANGE UP (ref 0.5–2)
LEUKOCYTE ESTERASE UR-ACNC: NEGATIVE — SIGNIFICANT CHANGE UP
LEUKOCYTE ESTERASE UR-ACNC: NEGATIVE — SIGNIFICANT CHANGE UP
LYMPHOCYTES # BLD AUTO: 1.2 K/UL — SIGNIFICANT CHANGE UP (ref 1–3.3)
LYMPHOCYTES # BLD AUTO: 1.2 K/UL — SIGNIFICANT CHANGE UP (ref 1–3.3)
LYMPHOCYTES # BLD AUTO: 1.89 K/UL — SIGNIFICANT CHANGE UP (ref 1–3.3)
LYMPHOCYTES # BLD AUTO: 1.89 K/UL — SIGNIFICANT CHANGE UP (ref 1–3.3)
LYMPHOCYTES # BLD AUTO: 11 % — LOW (ref 13–44)
LYMPHOCYTES # BLD AUTO: 11 % — LOW (ref 13–44)
LYMPHOCYTES # BLD AUTO: 16.2 % — SIGNIFICANT CHANGE UP (ref 13–44)
LYMPHOCYTES # BLD AUTO: 16.2 % — SIGNIFICANT CHANGE UP (ref 13–44)
MCHC RBC-ENTMCNC: 29.1 PG — SIGNIFICANT CHANGE UP (ref 27–34)
MCHC RBC-ENTMCNC: 29.1 PG — SIGNIFICANT CHANGE UP (ref 27–34)
MCHC RBC-ENTMCNC: 30.4 PG — SIGNIFICANT CHANGE UP (ref 27–34)
MCHC RBC-ENTMCNC: 30.4 PG — SIGNIFICANT CHANGE UP (ref 27–34)
MCHC RBC-ENTMCNC: 34 GM/DL — SIGNIFICANT CHANGE UP (ref 32–36)
MCHC RBC-ENTMCNC: 34 GM/DL — SIGNIFICANT CHANGE UP (ref 32–36)
MCHC RBC-ENTMCNC: 35.6 GM/DL — SIGNIFICANT CHANGE UP (ref 32–36)
MCHC RBC-ENTMCNC: 35.6 GM/DL — SIGNIFICANT CHANGE UP (ref 32–36)
MCV RBC AUTO: 85.3 FL — SIGNIFICANT CHANGE UP (ref 80–100)
MCV RBC AUTO: 85.3 FL — SIGNIFICANT CHANGE UP (ref 80–100)
MCV RBC AUTO: 85.7 FL — SIGNIFICANT CHANGE UP (ref 80–100)
MCV RBC AUTO: 85.7 FL — SIGNIFICANT CHANGE UP (ref 80–100)
METHADONE UR-MCNC: NEGATIVE — SIGNIFICANT CHANGE UP
METHADONE UR-MCNC: NEGATIVE — SIGNIFICANT CHANGE UP
MONOCYTES # BLD AUTO: 0.73 K/UL — SIGNIFICANT CHANGE UP (ref 0–0.9)
MONOCYTES # BLD AUTO: 0.73 K/UL — SIGNIFICANT CHANGE UP (ref 0–0.9)
MONOCYTES # BLD AUTO: 0.94 K/UL — HIGH (ref 0–0.9)
MONOCYTES # BLD AUTO: 0.94 K/UL — HIGH (ref 0–0.9)
MONOCYTES NFR BLD AUTO: 6.7 % — SIGNIFICANT CHANGE UP (ref 2–14)
MONOCYTES NFR BLD AUTO: 6.7 % — SIGNIFICANT CHANGE UP (ref 2–14)
MONOCYTES NFR BLD AUTO: 8 % — SIGNIFICANT CHANGE UP (ref 2–14)
MONOCYTES NFR BLD AUTO: 8 % — SIGNIFICANT CHANGE UP (ref 2–14)
NEUTROPHILS # BLD AUTO: 8.4 K/UL — HIGH (ref 1.8–7.4)
NEUTROPHILS # BLD AUTO: 8.4 K/UL — HIGH (ref 1.8–7.4)
NEUTROPHILS # BLD AUTO: 8.57 K/UL — HIGH (ref 1.8–7.4)
NEUTROPHILS # BLD AUTO: 8.57 K/UL — HIGH (ref 1.8–7.4)
NEUTROPHILS NFR BLD AUTO: 71.9 % — SIGNIFICANT CHANGE UP (ref 43–77)
NEUTROPHILS NFR BLD AUTO: 71.9 % — SIGNIFICANT CHANGE UP (ref 43–77)
NEUTROPHILS NFR BLD AUTO: 78.8 % — HIGH (ref 43–77)
NEUTROPHILS NFR BLD AUTO: 78.8 % — HIGH (ref 43–77)
NITRITE UR-MCNC: NEGATIVE — SIGNIFICANT CHANGE UP
NITRITE UR-MCNC: NEGATIVE — SIGNIFICANT CHANGE UP
OPIATES UR-MCNC: NEGATIVE — SIGNIFICANT CHANGE UP
OPIATES UR-MCNC: NEGATIVE — SIGNIFICANT CHANGE UP
PCO2 BLDV: 41 MMHG — LOW (ref 42–55)
PCO2 BLDV: 41 MMHG — LOW (ref 42–55)
PCP SPEC-MCNC: SIGNIFICANT CHANGE UP
PCP SPEC-MCNC: SIGNIFICANT CHANGE UP
PCP UR-MCNC: NEGATIVE — SIGNIFICANT CHANGE UP
PCP UR-MCNC: NEGATIVE — SIGNIFICANT CHANGE UP
PH BLDV: 7.4 — SIGNIFICANT CHANGE UP (ref 7.32–7.43)
PH BLDV: 7.4 — SIGNIFICANT CHANGE UP (ref 7.32–7.43)
PH UR: 6 — SIGNIFICANT CHANGE UP (ref 5–8)
PH UR: 6 — SIGNIFICANT CHANGE UP (ref 5–8)
PLATELET # BLD AUTO: 274 K/UL — SIGNIFICANT CHANGE UP (ref 150–400)
PLATELET # BLD AUTO: 274 K/UL — SIGNIFICANT CHANGE UP (ref 150–400)
PLATELET # BLD AUTO: 278 K/UL — SIGNIFICANT CHANGE UP (ref 150–400)
PLATELET # BLD AUTO: 278 K/UL — SIGNIFICANT CHANGE UP (ref 150–400)
PO2 BLDV: 117 MMHG — HIGH (ref 25–45)
PO2 BLDV: 117 MMHG — HIGH (ref 25–45)
POTASSIUM BLDV-SCNC: 4 MMOL/L — SIGNIFICANT CHANGE UP (ref 3.5–5.1)
POTASSIUM BLDV-SCNC: 4 MMOL/L — SIGNIFICANT CHANGE UP (ref 3.5–5.1)
POTASSIUM SERPL-MCNC: 4.1 MMOL/L — SIGNIFICANT CHANGE UP (ref 3.5–5.3)
POTASSIUM SERPL-MCNC: 4.1 MMOL/L — SIGNIFICANT CHANGE UP (ref 3.5–5.3)
POTASSIUM SERPL-SCNC: 4.1 MMOL/L — SIGNIFICANT CHANGE UP (ref 3.5–5.3)
POTASSIUM SERPL-SCNC: 4.1 MMOL/L — SIGNIFICANT CHANGE UP (ref 3.5–5.3)
PROT UR-MCNC: SIGNIFICANT CHANGE UP MG/DL
PROT UR-MCNC: SIGNIFICANT CHANGE UP MG/DL
RBC # BLD: 4.91 M/UL — SIGNIFICANT CHANGE UP (ref 4.2–5.8)
RBC # BLD: 4.91 M/UL — SIGNIFICANT CHANGE UP (ref 4.2–5.8)
RBC # BLD: 5.04 M/UL — SIGNIFICANT CHANGE UP (ref 4.2–5.8)
RBC # BLD: 5.04 M/UL — SIGNIFICANT CHANGE UP (ref 4.2–5.8)
RBC # FLD: 12.3 % — SIGNIFICANT CHANGE UP (ref 10.3–14.5)
RBC # FLD: 12.3 % — SIGNIFICANT CHANGE UP (ref 10.3–14.5)
RBC # FLD: 12.4 % — SIGNIFICANT CHANGE UP (ref 10.3–14.5)
RBC # FLD: 12.4 % — SIGNIFICANT CHANGE UP (ref 10.3–14.5)
RBC CASTS # UR COMP ASSIST: 2 /HPF — SIGNIFICANT CHANGE UP (ref 0–4)
RBC CASTS # UR COMP ASSIST: 2 /HPF — SIGNIFICANT CHANGE UP (ref 0–4)
RSV RNA NPH QL NAA+NON-PROBE: SIGNIFICANT CHANGE UP
RSV RNA NPH QL NAA+NON-PROBE: SIGNIFICANT CHANGE UP
SALICYLATES SERPL-MCNC: <0.6 MG/DL — LOW (ref 10–20)
SALICYLATES SERPL-MCNC: <0.6 MG/DL — LOW (ref 10–20)
SAO2 % BLDV: 99.2 % — SIGNIFICANT CHANGE UP
SAO2 % BLDV: 99.2 % — SIGNIFICANT CHANGE UP
SARS-COV-2 RNA SPEC QL NAA+PROBE: SIGNIFICANT CHANGE UP
SARS-COV-2 RNA SPEC QL NAA+PROBE: SIGNIFICANT CHANGE UP
SODIUM SERPL-SCNC: 136 MMOL/L — SIGNIFICANT CHANGE UP (ref 135–145)
SODIUM SERPL-SCNC: 136 MMOL/L — SIGNIFICANT CHANGE UP (ref 135–145)
SP GR SPEC: 1.02 — SIGNIFICANT CHANGE UP (ref 1–1.03)
SP GR SPEC: 1.02 — SIGNIFICANT CHANGE UP (ref 1–1.03)
SQUAMOUS # UR AUTO: 0 /HPF — SIGNIFICANT CHANGE UP (ref 0–5)
SQUAMOUS # UR AUTO: 0 /HPF — SIGNIFICANT CHANGE UP (ref 0–5)
THC UR QL: NEGATIVE — SIGNIFICANT CHANGE UP
THC UR QL: NEGATIVE — SIGNIFICANT CHANGE UP
UROBILINOGEN FLD QL: 1 MG/DL — SIGNIFICANT CHANGE UP (ref 0.2–1)
UROBILINOGEN FLD QL: 1 MG/DL — SIGNIFICANT CHANGE UP (ref 0.2–1)
WBC # BLD: 10.88 K/UL — HIGH (ref 3.8–10.5)
WBC # BLD: 10.88 K/UL — HIGH (ref 3.8–10.5)
WBC # BLD: 11.69 K/UL — HIGH (ref 3.8–10.5)
WBC # BLD: 11.69 K/UL — HIGH (ref 3.8–10.5)
WBC # FLD AUTO: 10.88 K/UL — HIGH (ref 3.8–10.5)
WBC # FLD AUTO: 10.88 K/UL — HIGH (ref 3.8–10.5)
WBC # FLD AUTO: 11.69 K/UL — HIGH (ref 3.8–10.5)
WBC # FLD AUTO: 11.69 K/UL — HIGH (ref 3.8–10.5)
WBC UR QL: 1 /HPF — SIGNIFICANT CHANGE UP (ref 0–5)
WBC UR QL: 1 /HPF — SIGNIFICANT CHANGE UP (ref 0–5)

## 2023-11-13 PROCEDURE — 71046 X-RAY EXAM CHEST 2 VIEWS: CPT | Mod: 26

## 2023-11-13 PROCEDURE — 99285 EMERGENCY DEPT VISIT HI MDM: CPT

## 2023-11-13 PROCEDURE — 93010 ELECTROCARDIOGRAM REPORT: CPT

## 2023-11-13 PROCEDURE — 90792 PSYCH DIAG EVAL W/MED SRVCS: CPT

## 2023-11-13 RX ORDER — SODIUM CHLORIDE 9 MG/ML
2049 INJECTION INTRAMUSCULAR; INTRAVENOUS; SUBCUTANEOUS ONCE
Refills: 0 | Status: COMPLETED | OUTPATIENT
Start: 2023-11-13 | End: 2023-11-13

## 2023-11-13 RX ORDER — LISINOPRIL 2.5 MG/1
10 TABLET ORAL DAILY
Refills: 0 | Status: DISCONTINUED | OUTPATIENT
Start: 2023-11-13 | End: 2023-11-20

## 2023-11-13 RX ORDER — CLOZAPINE 150 MG/1
25 TABLET, ORALLY DISINTEGRATING ORAL ONCE
Refills: 0 | Status: COMPLETED | OUTPATIENT
Start: 2023-11-13 | End: 2023-11-13

## 2023-11-13 RX ORDER — ATORVASTATIN CALCIUM 80 MG/1
40 TABLET, FILM COATED ORAL AT BEDTIME
Refills: 0 | Status: DISCONTINUED | OUTPATIENT
Start: 2023-11-13 | End: 2023-11-20

## 2023-11-13 RX ORDER — METFORMIN HYDROCHLORIDE 850 MG/1
2000 TABLET ORAL
Refills: 0 | Status: DISCONTINUED | OUTPATIENT
Start: 2023-11-13 | End: 2023-11-20

## 2023-11-13 RX ORDER — FLUPHENAZINE HYDROCHLORIDE 1 MG/1
5 TABLET, FILM COATED ORAL ONCE
Refills: 0 | Status: DISCONTINUED | OUTPATIENT
Start: 2023-11-13 | End: 2023-11-13

## 2023-11-13 RX ORDER — CEFTRIAXONE 500 MG/1
1000 INJECTION, POWDER, FOR SOLUTION INTRAMUSCULAR; INTRAVENOUS ONCE
Refills: 0 | Status: COMPLETED | OUTPATIENT
Start: 2023-11-13 | End: 2023-11-13

## 2023-11-13 RX ORDER — DIVALPROEX SODIUM 500 MG/1
500 TABLET, DELAYED RELEASE ORAL DAILY
Refills: 0 | Status: DISCONTINUED | OUTPATIENT
Start: 2023-11-13 | End: 2023-11-13

## 2023-11-13 RX ORDER — DIVALPROEX SODIUM 500 MG/1
500 TABLET, DELAYED RELEASE ORAL AT BEDTIME
Refills: 0 | Status: DISCONTINUED | OUTPATIENT
Start: 2023-11-13 | End: 2023-11-20

## 2023-11-13 RX ORDER — BENZTROPINE MESYLATE 1 MG
1 TABLET ORAL ONCE
Refills: 0 | Status: COMPLETED | OUTPATIENT
Start: 2023-11-13 | End: 2023-11-13

## 2023-11-13 RX ORDER — METFORMIN HYDROCHLORIDE 850 MG/1
2000 TABLET ORAL
Refills: 0 | Status: DISCONTINUED | OUTPATIENT
Start: 2023-11-13 | End: 2023-11-13

## 2023-11-13 RX ORDER — CEFTRIAXONE 500 MG/1
1000 INJECTION, POWDER, FOR SOLUTION INTRAMUSCULAR; INTRAVENOUS ONCE
Refills: 0 | Status: DISCONTINUED | OUTPATIENT
Start: 2023-11-13 | End: 2023-11-13

## 2023-11-13 RX ORDER — GUAIFENESIN/DEXTROMETHORPHAN 600MG-30MG
10 TABLET, EXTENDED RELEASE 12 HR ORAL ONCE
Refills: 0 | Status: COMPLETED | OUTPATIENT
Start: 2023-11-13 | End: 2023-11-13

## 2023-11-13 RX ORDER — ACETAMINOPHEN 500 MG
975 TABLET ORAL ONCE
Refills: 0 | Status: COMPLETED | OUTPATIENT
Start: 2023-11-13 | End: 2023-11-13

## 2023-11-13 RX ORDER — AZITHROMYCIN 500 MG/1
500 TABLET, FILM COATED ORAL ONCE
Refills: 0 | Status: COMPLETED | OUTPATIENT
Start: 2023-11-13 | End: 2023-11-13

## 2023-11-13 RX ADMIN — Medication 2 MILLIGRAM(S): at 11:52

## 2023-11-13 RX ADMIN — CLOZAPINE 25 MILLIGRAM(S): 150 TABLET, ORALLY DISINTEGRATING ORAL at 13:36

## 2023-11-13 RX ADMIN — AZITHROMYCIN 255 MILLIGRAM(S): 500 TABLET, FILM COATED ORAL at 23:15

## 2023-11-13 RX ADMIN — ATORVASTATIN CALCIUM 40 MILLIGRAM(S): 80 TABLET, FILM COATED ORAL at 21:19

## 2023-11-13 RX ADMIN — Medication 975 MILLIGRAM(S): at 22:05

## 2023-11-13 RX ADMIN — Medication 10 MILLILITER(S): at 13:37

## 2023-11-13 RX ADMIN — Medication 975 MILLIGRAM(S): at 23:12

## 2023-11-13 RX ADMIN — METFORMIN HYDROCHLORIDE 2000 MILLIGRAM(S): 850 TABLET ORAL at 20:05

## 2023-11-13 RX ADMIN — LISINOPRIL 10 MILLIGRAM(S): 2.5 TABLET ORAL at 20:05

## 2023-11-13 RX ADMIN — Medication 100 MILLIGRAM(S): at 13:36

## 2023-11-13 RX ADMIN — DIVALPROEX SODIUM 500 MILLIGRAM(S): 500 TABLET, DELAYED RELEASE ORAL at 21:20

## 2023-11-13 RX ADMIN — CEFTRIAXONE 1000 MILLIGRAM(S): 500 INJECTION, POWDER, FOR SOLUTION INTRAMUSCULAR; INTRAVENOUS at 23:12

## 2023-11-13 RX ADMIN — SODIUM CHLORIDE 2049 MILLILITER(S): 9 INJECTION INTRAMUSCULAR; INTRAVENOUS; SUBCUTANEOUS at 23:13

## 2023-11-13 NOTE — ED ADULT TRIAGE NOTE - CHIEF COMPLAINT QUOTE
pt states he wants to go the  unit. pt states his doctor lowered his dose of depakote and cloziril. pt denies SI/HI at this time but states that hes not sure if he will kill himself if the voices told him too. pt also c/o sore throat.

## 2023-11-13 NOTE — ED STATDOCS - PROGRESS NOTE DETAILS
Ramy: pt signed out by dr. hurtado in  pending eval otherwise was medically clearead; called by  as pt had a fever of 99.9 tachy cxr cxr reviewed + lll infiltrate will pull to main for IV fluids abx as meets code sepsis now- blood cultures already done; full rvp - iv abx fluids, lactate reassess Patient received in sign-out from Dr. Mccann. Patient received in sign-out pending re-assessment by psychiatric team. Patient also found to have acute pneumonia.

## 2023-11-13 NOTE — ED STATDOCS - NS_EDPROVIDERDISPOUSERTYPE_ED_A_ED
None known Scribe Attestation (For Scribes USE Only)... Attending Attestation (For Attendings USE Only).../Scribe Attestation (For Scribes USE Only)...

## 2023-11-13 NOTE — ED BEHAVIORAL HEALTH ASSESSMENT NOTE - RISK ASSESSMENT
rf:  mental health conditions    pf:  help seeking  motivated and engaged in treatment  has providers  no si/hi

## 2023-11-13 NOTE — ED STATDOCS - CLINICAL SUMMARY MEDICAL DECISION MAKING FREE TEXT BOX
Pt requesting BH evaluation due to lowering of medication and auditory hallucinations. Currently not suicidal. Sore throat likely viral pharyngitis, though will swab for Strep. Plan for medical clearance and BH evaluation. Pt requesting BH evaluation due to lowering of medication and auditory hallucinations. Currently not suicidal. Sore throat likely viral pharyngitis, though will swab for Strep. Plan for medical clearance and  evaluation. BH called and given sign out on patient.

## 2023-11-13 NOTE — ED ADULT NURSE NOTE - HPI (INCLUDE ILLNESS QUALITY, SEVERITY, DURATION, TIMING, CONTEXT, MODIFYING FACTORS, ASSOCIATED SIGNS AND SYMPTOMS)
Patient is a 49 y/o male who comes to  after being medically cleared in main ED. He reports feeling like he needs to restart his psychotropic medication regimen. Pt states he knows he is not behaving or acting the way he usually does. He attributes this to his being tapered off of his medications. States, "I didn't think the Clozaril was helping me that much until I stopped taking it all together." Pt is ambivalent about best course of treatment, uncertain if he needs to go inpatient. Reports he was sober for several years, but had a relapse a few weeks ago, and had two beers a couple of days ago. Pt's  called unit to express concerns about pt's change in behavior. Stated he has been sleeping poorly and more delusional than usual. Cooperative with intake interview and orientation to  area.

## 2023-11-13 NOTE — ED STATDOCS - PHYSICAL EXAMINATION
Constitutional: Awake, Alert, non-toxic. No acute distress.  HEAD: Normocephalic, atraumatic.   EYES: PERRL, EOM intact, conjunctiva and sclera are clear bilaterally.  ENT: External ears normal. No rhinorrhea, no tracheal deviation   NECK: Supple, non-tender  CARDIOVASCULAR: regular rate and rhythm.  RESPIRATORY: Normal respiratory effort; breath sounds CTAB, no wheezes, rhonchi, or rales. Speaking in full sentences. No accessory muscle use.   ABDOMEN: Soft; non-tender, non-distended. No rebound or guarding.   MSK:  no lower extremity edema, no deformities  SKIN: Warm, dry  NEURO: A&O x3. Sensory and motor functions are grossly intact. Speech is normal. No facial droop.  PSYCH: Appearance and judgement seem appropriate for gender and age. denies SI/HI. + auditory hallucinations Constitutional: Awake, Alert, non-toxic. No acute distress.  HEAD: Normocephalic, atraumatic.   EYES: PERRL, EOM intact, conjunctiva and sclera are clear bilaterally.  ENT: External ears normal. No rhinorrhea, no tracheal deviation, no oropharyngeal edema/erythema  NECK: Supple, non-tender  CARDIOVASCULAR: tachycardic rate and rhythm.  RESPIRATORY: Normal respiratory effort; breath sounds CTAB, no wheezes, rhonchi, or rales. Speaking in full sentences. No accessory muscle use.   ABDOMEN: Soft; non-tender, non-distended. No rebound or guarding.   MSK:  no lower extremity edema, no deformities  SKIN: Warm, dry  NEURO: A&O x3. Sensory and motor functions are grossly intact. Speech is normal. No facial droop.  PSYCH: denies SI/HI. + auditory hallucinations, conversational

## 2023-11-13 NOTE — ED BEHAVIORAL HEALTH ASSESSMENT NOTE - NS ED BHA SUICIDALITY PRESENT CURRENT PASSIVE IDEATION
[[id number]],[NS_DeliveryAssist1_OBGYN_ALL_OB_FT:[id number]] No

## 2023-11-13 NOTE — ED BEHAVIORAL HEALTH ASSESSMENT NOTE - SUMMARY
48 year old single domiciled unemployed on ssd man with pph schizophrenia anxiety multiple prior admissions fu through [facility], pmh restless leg who self presents to hospital for psychiatric evaluation.  Endorses mild symptoms of psychosis which has been ongoing for several weeks.  Amenable to one time lorazepam and fluphenazine. Awaiting collateral.  Will re assess for final disposition.

## 2023-11-13 NOTE — ED BEHAVIORAL HEALTH ASSESSMENT NOTE - CURRENT MEDICATION
confirmed through [facility] pharmacy depakote 500 mg daily, lipitor 40 mg nightly, benztropine 1 mg daily, metformin er 2000 mg with dinner, vitamin d

## 2023-11-13 NOTE — ED BEHAVIORAL HEALTH ASSESSMENT NOTE - OTHER PAST PSYCHIATRIC HISTORY (INCLUDE DETAILS REGARDING ONSET, COURSE OF ILLNESS, INPATIENT/OUTPATIENT TREATMENT)
History of schizophrenia multiple prior admissions  fu with [facility], meds through [facility] pharmacy

## 2023-11-13 NOTE — ED BEHAVIORAL HEALTH ASSESSMENT NOTE - DESCRIPTION
Vital Signs Last 24 Hrs  T(C): 36.4 (13 Nov 2023 09:44), Max: 36.9 (13 Nov 2023 08:04)  T(F): 97.6 (13 Nov 2023 09:44), Max: 98.5 (13 Nov 2023 08:04)  HR: 106 (13 Nov 2023 09:44) (106 - 107)  BP: 149/94 (13 Nov 2023 09:44) (149/94 - 150/91)  BP(mean): --  RR: 18 (13 Nov 2023 09:44) (18 - 20)  SpO2: 95% (13 Nov 2023 09:44) (95% - 98%)    Parameters below as of 13 Nov 2023 09:44  Patient On (Oxygen Delivery Method): room air restless leg single no children unemployed on ssd

## 2023-11-13 NOTE — ED ADULT NURSE NOTE - PATIENT'S CHIEF COMPLAINT
"I was being tapered off of my Clozapine, and I am starting to have delusional thoughts coming back."

## 2023-11-13 NOTE — ED STATDOCS - OBJECTIVE STATEMENT
47 y/o male with pmhx of schizophrenia, c/o psychiatric evaluation. Pt states asked psychiatrist Dr. Burks to decrease psych medication who did and since has been felt out of it. Pt also c/o sore throat which improved after taking cough drops. Denies fever but states was sweating last night. + auditory hallucinations. Denies visual hallucinations, SI/HI. 47 y/o male with pmhx of schizophrenia, c/o psychiatric evaluation. Pt states asked psychiatrist Dr. Burks to decrease psych medication who did and since has been feeling out of it. Pt also c/o sore throat which improved after taking cough drops. Denies fever but states was sweating last night. + auditory hallucinations. Denies visual hallucinations, SI/HI. denies hx of suicidal attempts.

## 2023-11-13 NOTE — ED BEHAVIORAL HEALTH ASSESSMENT NOTE - HPI (INCLUDE ILLNESS QUALITY, SEVERITY, DURATION, TIMING, CONTEXT, MODIFYING FACTORS, ASSOCIATED SIGNS AND SYMPTOMS)
48 year old single domiciled unemployed on ssd man with pph schizophrenia anxiety multiple prior admissions fu through [facility], pmh restless leg who self presents to hospital for psychiatric evaluation.    Per chart review  current ed encounter  "47 y/o male with pmhx of schizophrenia, c/o psychiatric evaluation. Pt states asked psychiatrist Dr. Burks to decrease psych medication who did and since has been feeling out of it. Pt also c/o sore throat which improved after taking cough drops. Denies fever but states was sweating last night. + auditory hallucinations. Denies visual hallucinations, SI/HI. denies hx of suicidal attempts."    During encounter, pleasant calm cooperative. Does not appear particularly distressed dysphoric or overtly manic or psychotic. Linear throughout. Reports feeling okay though states that he's been having vague paranoia and auditory hallucinations ongoing for several weeks-months despite compliance with medications. Fu through [facility] with Dr. Burks (unable to reach, discreet message left). Confirmed partial med list through [facility] pharmacy (patient adds that he also receives a prolixin long acting injection, next scheduled administration tomorrow). Adds that he was previously on clozapine 50 mg nightly though was discontinued for unclear reasons (believes that his provider did not think it necessary considering low dose vs frequent cbc monitoring). Valproic acid decreased from 1500 to 500 mg by his request. Prolixin 25 mg [id number].  No si/hi. AH is non command in nature.  Relapsed on alcohol several weeks ago, last drink last tuesday 2x 24 ounces beers. No tobacco or drug use.  Amenable to one time doses of lorazepam and fluphenazine. 48 year old single domiciled unemployed on ssd man with pph schizophrenia anxiety multiple prior admissions fu through [facility], pmh restless leg who self presents to hospital for psychiatric evaluation.    Per chart review  current ed encounter  "49 y/o male with pmhx of schizophrenia, c/o psychiatric evaluation. Pt states asked psychiatrist Dr. Burks to decrease psych medication who did and since has been feeling out of it. Pt also c/o sore throat which improved after taking cough drops. Denies fever but states was sweating last night. + auditory hallucinations. Denies visual hallucinations, SI/HI. denies hx of suicidal attempts."    During encounter, pleasant calm cooperative. Does not appear particularly distressed dysphoric or overtly manic or psychotic. Linear throughout. Reports feeling okay though states that he's been having vague paranoia and auditory hallucinations ongoing for several weeks-months despite compliance with medications. Fu through [facility] with Dr. Burks (unable to reach, discreet message left). Confirmed partial med list through [facility] pharmacy (patient adds that he also receives a prolixin long acting injection, next scheduled administration tomorrow). Adds that he was previously on clozapine 50 mg nightly though was discontinued for unclear reasons (believes that his provider did not think it necessary considering low dose vs frequent cbc monitoring). Valproic acid decreased from 1500 to 500 mg by his request. Prolixin 25 mg [id number].  No si/hi. AH is non command in nature.  Relapsed on alcohol several weeks ago, last drink last tuesday 2x 24 ounces beers. No tobacco or drug use.  Amenable to one time doses of lorazepam and fluphenazine.    Collateral information obtained from [facility] [phone]    states that he's generally very pleasant and has a routine and that in recent weeks has been more withdrawn and sleeping less. adds that he has only recently been changed from having an act team to following up with [facility]. no safety concerns expressed. 48 year old single domiciled unemployed on ssd man with pph schizophrenia anxiety multiple prior admissions fu through [facility], pmh restless leg who self presents to hospital for psychiatric evaluation.    Per chart review  current ed encounter  "47 y/o male with pmhx of schizophrenia, c/o psychiatric evaluation. Pt states asked psychiatrist Dr. Burks to decrease psych medication who did and since has been feeling out of it. Pt also c/o sore throat which improved after taking cough drops. Denies fever but states was sweating last night. + auditory hallucinations. Denies visual hallucinations, SI/HI. denies hx of suicidal attempts."    During encounter, pleasant calm cooperative. Does not appear particularly distressed dysphoric or overtly manic or psychotic. Linear throughout. Reports feeling okay though states that he's been having vague paranoia and auditory hallucinations ongoing for several weeks-months despite compliance with medications. Fu through [facility] with Dr. Burks (unable to reach, discreet message left). Confirmed partial med list through [facility] pharmacy (patient adds that he also receives a prolixin long acting injection, next scheduled administration tomorrow). Adds that he was previously on clozapine 50 mg nightly though was discontinued for unclear reasons (believes that his provider did not think it necessary considering low dose vs frequent cbc monitoring). Valproic acid decreased from 1500 to 500 mg by his request. Prolixin 25 mg [id number].  No si/hi. AH is non command in nature.  Relapsed on alcohol several weeks ago, last drink last tuesday 2x 24 ounces beers. No tobacco or drug use.  Amenable to one time doses of lorazepam and clozapine. risks and benefits discussed with patient who consents to medication    Collateral information obtained from [facility] [phone]    states that he's generally very pleasant and has a routine and that in recent weeks has been more withdrawn and sleeping less. adds that he has only recently been changed from having an act team to following up with [facility]. no safety concerns expressed.    Collateral information obtained from yessenia dutta [facility] manager [phone]  had been previously sober from alcohol and cigarettes for 9+ years, relapsing recently. states that he drank 12 pack of beer recently and not sleeping well. appears that he had been doing better while on clozapine.    Collateral information obtained from linwood [facility] director [phone]  confirms medications that was previously on clozapine 50 mg daily last time in july 2023 and now on valproic acid 500 mg fluphenazine 25 mg [id number] (due tomorrow and benztropine 1 mg daily 48 year old single domiciled unemployed on ssd man with pph schizophrenia anxiety multiple prior admissions fu through [facility], pmh restless leg who self presents to hospital for psychiatric evaluation.    Per chart review  current ed encounter  "49 y/o male with pmhx of schizophrenia, c/o psychiatric evaluation. Pt states asked psychiatrist Dr. Burks to decrease psych medication who did and since has been feeling out of it. Pt also c/o sore throat which improved after taking cough drops. Denies fever but states was sweating last night. + auditory hallucinations. Denies visual hallucinations, SI/HI. denies hx of suicidal attempts."    During encounter, pleasant calm cooperative. Does not appear particularly distressed dysphoric or overtly manic or psychotic. Linear throughout. Reports feeling okay though states that he's been having vague paranoia and auditory hallucinations ongoing for several weeks-months despite compliance with medications. Fu through [facility] with Dr. Burks (unable to reach, discreet message left). Confirmed partial med list through [facility] pharmacy (patient adds that he also receives a prolixin long acting injection, next scheduled administration tomorrow). Adds that he was previously on clozapine 50 mg nightly though was discontinued for unclear reasons (believes that his provider did not think it necessary considering low dose vs frequent cbc monitoring). Valproic acid decreased from 1500 to 500 mg by his request. Prolixin 25 mg [id number].  No si/hi. AH is non command in nature.  Relapsed on alcohol several weeks ago, last drink last tuesday 2x 24 ounces beers. No tobacco or drug use.  Amenable to one time doses of lorazepam and clozapine. risks and benefits discussed with patient who consents to medication    Collateral information obtained from [facility] [phone]    states that he's generally very pleasant and has a routine and that in recent weeks has been more withdrawn and sleeping less. adds that he has only recently been changed from having an act team to following up with [facility]. no safety concerns expressed.    Collateral information obtained from yessenia dutta [facility] manager [phone]  had been previously sober from alcohol and cigarettes for 9+ years, relapsing recently. states that he drank 12 pack of beer recently and not sleeping well. appears that he had been doing better while on clozapine.    Collateral information obtained from linwood [facility] director [phone]  confirms medications that was previously on clozapine 50 mg daily last time in july 2023 and now on valproic acid 500 mg fluphenazine 25 mg [id number] (due tomorrow) and benztropine 1 mg daily

## 2023-11-13 NOTE — ED STATDOCS - CARE PLAN
Principal Discharge DX:	Hallucinations  Secondary Diagnosis:	Sore throat   1 Principal Discharge DX:	Hallucinations  Secondary Diagnosis:	Sore throat  Secondary Diagnosis:	Pneumonia

## 2023-11-14 VITALS — HEART RATE: 95 BPM | OXYGEN SATURATION: 95 %

## 2023-11-14 LAB
ALBUMIN SERPL ELPH-MCNC: 4.4 G/DL — SIGNIFICANT CHANGE UP (ref 3.3–5.2)
ALBUMIN SERPL ELPH-MCNC: 4.4 G/DL — SIGNIFICANT CHANGE UP (ref 3.3–5.2)
ALP SERPL-CCNC: 84 U/L — SIGNIFICANT CHANGE UP (ref 40–120)
ALP SERPL-CCNC: 84 U/L — SIGNIFICANT CHANGE UP (ref 40–120)
ALT FLD-CCNC: 22 U/L — SIGNIFICANT CHANGE UP
ALT FLD-CCNC: 22 U/L — SIGNIFICANT CHANGE UP
ANION GAP SERPL CALC-SCNC: 14 MMOL/L — SIGNIFICANT CHANGE UP (ref 5–17)
ANION GAP SERPL CALC-SCNC: 14 MMOL/L — SIGNIFICANT CHANGE UP (ref 5–17)
APTT BLD: 31.3 SEC — SIGNIFICANT CHANGE UP (ref 24.5–35.6)
APTT BLD: 31.3 SEC — SIGNIFICANT CHANGE UP (ref 24.5–35.6)
AST SERPL-CCNC: 14 U/L — SIGNIFICANT CHANGE UP
AST SERPL-CCNC: 14 U/L — SIGNIFICANT CHANGE UP
BILIRUB SERPL-MCNC: 0.8 MG/DL — SIGNIFICANT CHANGE UP (ref 0.4–2)
BILIRUB SERPL-MCNC: 0.8 MG/DL — SIGNIFICANT CHANGE UP (ref 0.4–2)
BUN SERPL-MCNC: 8.3 MG/DL — SIGNIFICANT CHANGE UP (ref 8–20)
BUN SERPL-MCNC: 8.3 MG/DL — SIGNIFICANT CHANGE UP (ref 8–20)
CALCIUM SERPL-MCNC: 9.4 MG/DL — SIGNIFICANT CHANGE UP (ref 8.4–10.5)
CALCIUM SERPL-MCNC: 9.4 MG/DL — SIGNIFICANT CHANGE UP (ref 8.4–10.5)
CHLORIDE SERPL-SCNC: 96 MMOL/L — SIGNIFICANT CHANGE UP (ref 96–108)
CHLORIDE SERPL-SCNC: 96 MMOL/L — SIGNIFICANT CHANGE UP (ref 96–108)
CO2 SERPL-SCNC: 23 MMOL/L — SIGNIFICANT CHANGE UP (ref 22–29)
CO2 SERPL-SCNC: 23 MMOL/L — SIGNIFICANT CHANGE UP (ref 22–29)
CREAT SERPL-MCNC: 0.86 MG/DL — SIGNIFICANT CHANGE UP (ref 0.5–1.3)
CREAT SERPL-MCNC: 0.86 MG/DL — SIGNIFICANT CHANGE UP (ref 0.5–1.3)
EGFR: 108 ML/MIN/1.73M2 — SIGNIFICANT CHANGE UP
EGFR: 108 ML/MIN/1.73M2 — SIGNIFICANT CHANGE UP
GLUCOSE SERPL-MCNC: 128 MG/DL — HIGH (ref 70–99)
GLUCOSE SERPL-MCNC: 128 MG/DL — HIGH (ref 70–99)
INR BLD: 1.18 RATIO — SIGNIFICANT CHANGE UP (ref 0.85–1.18)
INR BLD: 1.18 RATIO — SIGNIFICANT CHANGE UP (ref 0.85–1.18)
POTASSIUM SERPL-MCNC: 3.7 MMOL/L — SIGNIFICANT CHANGE UP (ref 3.5–5.3)
POTASSIUM SERPL-MCNC: 3.7 MMOL/L — SIGNIFICANT CHANGE UP (ref 3.5–5.3)
POTASSIUM SERPL-SCNC: 3.7 MMOL/L — SIGNIFICANT CHANGE UP (ref 3.5–5.3)
POTASSIUM SERPL-SCNC: 3.7 MMOL/L — SIGNIFICANT CHANGE UP (ref 3.5–5.3)
PROT SERPL-MCNC: 7.4 G/DL — SIGNIFICANT CHANGE UP (ref 6.6–8.7)
PROT SERPL-MCNC: 7.4 G/DL — SIGNIFICANT CHANGE UP (ref 6.6–8.7)
PROTHROM AB SERPL-ACNC: 13 SEC — SIGNIFICANT CHANGE UP (ref 9.5–13)
PROTHROM AB SERPL-ACNC: 13 SEC — SIGNIFICANT CHANGE UP (ref 9.5–13)
RAPID RVP RESULT: SIGNIFICANT CHANGE UP
RAPID RVP RESULT: SIGNIFICANT CHANGE UP
SARS-COV-2 RNA SPEC QL NAA+PROBE: SIGNIFICANT CHANGE UP
SARS-COV-2 RNA SPEC QL NAA+PROBE: SIGNIFICANT CHANGE UP
SODIUM SERPL-SCNC: 133 MMOL/L — LOW (ref 135–145)
SODIUM SERPL-SCNC: 133 MMOL/L — LOW (ref 135–145)

## 2023-11-14 PROCEDURE — 84132 ASSAY OF SERUM POTASSIUM: CPT

## 2023-11-14 PROCEDURE — 87040 BLOOD CULTURE FOR BACTERIA: CPT

## 2023-11-14 PROCEDURE — 82435 ASSAY OF BLOOD CHLORIDE: CPT

## 2023-11-14 PROCEDURE — 99285 EMERGENCY DEPT VISIT HI MDM: CPT | Mod: 25

## 2023-11-14 PROCEDURE — 71046 X-RAY EXAM CHEST 2 VIEWS: CPT

## 2023-11-14 PROCEDURE — 84295 ASSAY OF SERUM SODIUM: CPT

## 2023-11-14 PROCEDURE — 85018 HEMOGLOBIN: CPT

## 2023-11-14 PROCEDURE — 93005 ELECTROCARDIOGRAM TRACING: CPT

## 2023-11-14 PROCEDURE — 87798 DETECT AGENT NOS DNA AMP: CPT

## 2023-11-14 PROCEDURE — 96375 TX/PRO/DX INJ NEW DRUG ADDON: CPT

## 2023-11-14 PROCEDURE — 99236 HOSP IP/OBS SAME DATE HI 85: CPT

## 2023-11-14 PROCEDURE — 96374 THER/PROPH/DIAG INJ IV PUSH: CPT

## 2023-11-14 PROCEDURE — G0378: CPT

## 2023-11-14 PROCEDURE — 94640 AIRWAY INHALATION TREATMENT: CPT

## 2023-11-14 PROCEDURE — 87651 STREP A DNA AMP PROBE: CPT

## 2023-11-14 PROCEDURE — 83605 ASSAY OF LACTIC ACID: CPT

## 2023-11-14 PROCEDURE — 36415 COLL VENOUS BLD VENIPUNCTURE: CPT

## 2023-11-14 PROCEDURE — 82330 ASSAY OF CALCIUM: CPT

## 2023-11-14 PROCEDURE — 80048 BASIC METABOLIC PNL TOTAL CA: CPT

## 2023-11-14 PROCEDURE — 82947 ASSAY GLUCOSE BLOOD QUANT: CPT

## 2023-11-14 PROCEDURE — 87637 SARSCOV2&INF A&B&RSV AMP PRB: CPT

## 2023-11-14 PROCEDURE — 99213 OFFICE O/P EST LOW 20 MIN: CPT

## 2023-11-14 PROCEDURE — 80053 COMPREHEN METABOLIC PANEL: CPT

## 2023-11-14 PROCEDURE — 85610 PROTHROMBIN TIME: CPT

## 2023-11-14 PROCEDURE — 82803 BLOOD GASES ANY COMBINATION: CPT

## 2023-11-14 PROCEDURE — 81001 URINALYSIS AUTO W/SCOPE: CPT

## 2023-11-14 PROCEDURE — 80307 DRUG TEST PRSMV CHEM ANLYZR: CPT

## 2023-11-14 PROCEDURE — 0225U NFCT DS DNA&RNA 21 SARSCOV2: CPT

## 2023-11-14 PROCEDURE — 85730 THROMBOPLASTIN TIME PARTIAL: CPT

## 2023-11-14 PROCEDURE — 85025 COMPLETE CBC W/AUTO DIFF WBC: CPT

## 2023-11-14 PROCEDURE — 85014 HEMATOCRIT: CPT

## 2023-11-14 RX ORDER — CLOZAPINE 150 MG/1
25 TABLET, ORALLY DISINTEGRATING ORAL ONCE
Refills: 0 | Status: COMPLETED | OUTPATIENT
Start: 2023-11-14 | End: 2023-11-14

## 2023-11-14 RX ORDER — CLOZAPINE 150 MG/1
1 TABLET, ORALLY DISINTEGRATING ORAL
Qty: 1 | Refills: 0
Start: 2023-11-14 | End: 2023-11-14

## 2023-11-14 RX ORDER — IPRATROPIUM/ALBUTEROL SULFATE 18-103MCG
3 AEROSOL WITH ADAPTER (GRAM) INHALATION ONCE
Refills: 0 | Status: COMPLETED | OUTPATIENT
Start: 2023-11-14 | End: 2023-11-14

## 2023-11-14 RX ORDER — CEFTRIAXONE 500 MG/1
1000 INJECTION, POWDER, FOR SOLUTION INTRAMUSCULAR; INTRAVENOUS ONCE
Refills: 0 | Status: DISCONTINUED | OUTPATIENT
Start: 2023-11-14 | End: 2023-11-20

## 2023-11-14 RX ORDER — CLOZAPINE 150 MG/1
25 TABLET, ORALLY DISINTEGRATING ORAL DAILY
Refills: 0 | Status: DISCONTINUED | OUTPATIENT
Start: 2023-11-15 | End: 2023-11-20

## 2023-11-14 RX ORDER — ACETAMINOPHEN 500 MG
975 TABLET ORAL ONCE
Refills: 0 | Status: COMPLETED | OUTPATIENT
Start: 2023-11-14 | End: 2023-11-14

## 2023-11-14 RX ORDER — CEFTRIAXONE 500 MG/1
1000 INJECTION, POWDER, FOR SOLUTION INTRAMUSCULAR; INTRAVENOUS ONCE
Refills: 0 | Status: DISCONTINUED | OUTPATIENT
Start: 2023-11-14 | End: 2023-11-14

## 2023-11-14 RX ORDER — SODIUM CHLORIDE 9 MG/ML
1000 INJECTION INTRAMUSCULAR; INTRAVENOUS; SUBCUTANEOUS ONCE
Refills: 0 | Status: COMPLETED | OUTPATIENT
Start: 2023-11-14 | End: 2023-11-14

## 2023-11-14 RX ADMIN — Medication 3 MILLILITER(S): at 16:12

## 2023-11-14 RX ADMIN — Medication 100 MILLIGRAM(S): at 16:17

## 2023-11-14 RX ADMIN — Medication 975 MILLIGRAM(S): at 13:08

## 2023-11-14 RX ADMIN — CLOZAPINE 25 MILLIGRAM(S): 150 TABLET, ORALLY DISINTEGRATING ORAL at 10:49

## 2023-11-14 RX ADMIN — SODIUM CHLORIDE 1000 MILLILITER(S): 9 INJECTION INTRAMUSCULAR; INTRAVENOUS; SUBCUTANEOUS at 16:14

## 2023-11-14 NOTE — ED ADULT NURSE REASSESSMENT NOTE - GENERAL PATIENT STATE
comfortable appearance/cooperative/resting/sleeping
comfortable appearance
comfortable appearance/cooperative

## 2023-11-14 NOTE — ED ADULT NURSE REASSESSMENT NOTE - NS ED NURSE REASSESS COMMENT FT1
2 sets of blood cx's drawn, pt tolerated well.
dr calix at bedside to assess pt for cough, elevated hr, and low grade oral temp. pt re-swabbed for additional rvp cultures.
dr calix called made aware of vs . will be in to eval
dr mccollum called unit pt to return to er for further medical work up. report given to er staff.  belongs with pt to er for lock up
pt awake and alert oriented x3 and assessed by dr Maxwell. pt requested cough syrup and ed attending made aware. pt educated on the plan to hold in the ED/BH unit overnight and able to repeat back the plan. No attempts to harm self or others.
pt requesting more cough syrup dr calix made aware and is requesting that the pt have a cxr. pt has been brought to xray.
received report. received pt laying in bed medication orders noted pt medicated.  orders for xray noted xray called awaiting transport.
Patient has been resting in his room at times, reports positive results, and appears less restless since receiving prn Ativan 2mg earlier this afternoon. Pt slept for about 2-3 hours after receiving Ativan and cough syrup. Cough persists, and pt states he would like another dose of cough syrup. Pt informed another order would need to be obtained to rec'v next dose. Plan is to reassess pt in the AM to determine appropriate disposition.
plan of care assumed from Olivier RN. pt does not offer acute complaints. denies auditory or visual hallucinations, SI/HI. pt in yellow gown. [facility] staff member @ bedside for constant observations. plan of care ongoing.
rec pt. from night nurse. pt. appears calm, comfortable. 1:1 at bedside. pt. ambulatory to bathroom. safety maintained.

## 2023-11-14 NOTE — ED BEHAVIORAL HEALTH PROGRESS NOTE - RISK MITIGATION STRATEGIES IMPLEMENTED DETAILS FREE TEXT
Advised patient to go to nearest ER or call 911, for any of the followin) agitation aggression or anxiety, 2) suicidal or homicidal thoughts 3) worsening of symptoms or 4) side effects of medications

## 2023-11-14 NOTE — ED BEHAVIORAL HEALTH PROGRESS NOTE - SAFETY PLAN ADDT'L DETAILS
Safety plan discussed with... Mucosal Advancement Flap Text: Given the location of the defect, shape of the defect and the proximity to free margins a mucosal advancement flap was deemed most appropriate. Incisions were made with a 15 blade scalpel in the appropriate fashion along the cutaneous vermilion border and the mucosal lip. The remaining actinically damaged mucosal tissue was excised.  The mucosal advancement flap was then elevated to the gingival sulcus with care taken to preserve the neurovascular structures and advanced into the primary defect. Care was taken to ensure that precise realignment of the vermilion border was achieved.

## 2023-11-14 NOTE — ED BEHAVIORAL HEALTH PROGRESS NOTE - DETAILS
Advised patient to go to nearest ER or call 911, for any of the followin) agitation aggression or anxiety, 2) suicidal or homicidal thoughts 3) worsening of symptoms or 4) side effects of medications self referred

## 2023-11-14 NOTE — ED BEHAVIORAL HEALTH PROGRESS NOTE - DETAILS:
Evaluated for follow up. Reports feeling better from a psychiatric standpoint. No longer experiencing any active psychiatric issues concerns or complaints. States that he believes that the clozapine is helping with his perceptual disturbances and paranoia. Denies any suicidal/homicidal ideations plan or intent. Denies any active, significant affective or psychotic symptoms. Denies any active, significant perceptual disturbances, no overt delusions elicited. Does not appear distressed dysphoric or overtly manic/psychotic. Future oriented, forward looking, able to cite reasons for continued living. Does report having ongoing respiratory issues which ED primary team working up and managing.    [facility] director [phone] updated over phone this morning regarding adjustment in medications to clozapine. states that she will relay information to the treatment team. Evaluated for follow up. Reports feeling better from a psychiatric standpoint. No longer experiencing any active psychiatric issues concerns or complaints. States that he believes that the clozapine is helping with his perceptual disturbances and paranoia. Denies any suicidal/homicidal ideations plan or intent. Denies any active, significant affective or psychotic symptoms. Denies any active, significant perceptual disturbances, no overt delusions elicited. Does not appear distressed dysphoric or overtly manic/psychotic. Future oriented, forward looking, able to cite reasons for continued living. Does report having ongoing respiratory issues which ED primary team working up and managing.    evaluated again early afternoon. remains euthymic pleasant calm cooperative. has been without any affective or psychotic symptoms since change to clozapine which he would like to continue. no si/hi/avh, no overt delusions elicited.    [facility] director [phone] updated over phone this morning regarding adjustment in medications to clozapine. states that she will relay information to the treatment team. made aware of frequent cbc monitoring. expresses no safety concerns.

## 2023-11-14 NOTE — ED BEHAVIORAL HEALTH PROGRESS NOTE - CASE SUMMARY/FORMULATION (CLEARLY DOCUMENT RATIONALE FOR DISPOSITION CHANGE)
Patient denies any acute psychiatric issues concerns or complaints. No indication for admission to an acute inpatient psychiatric unit. Combined psychopharmacology and outpatient psychotherapy are the primary treatment modalities that are most likely to assist the patient in developing more productive means of managing his mental health conditions and navigating stressors/substance use, rather than an inpatient psychiatric admission.

## 2023-11-14 NOTE — ED BEHAVIORAL HEALTH PROGRESS NOTE - STANDING MEDS RECEIVED IN ED DETAILS FREE TEXT
MEDICATIONS  (STANDING):  atorvastatin 40 milliGRAM(s) Oral at bedtime  cloZAPine 25 milliGRAM(s) Oral once  divalproex  milliGRAM(s) Oral at bedtime  lisinopril 10 milliGRAM(s) Oral daily  metFORMIN Extended-Release 2000 milliGRAM(s) Oral <User Schedule>    MEDICATIONS  (PRN):

## 2023-11-14 NOTE — ED CDU PROVIDER INITIAL DAY NOTE - PHYSICAL EXAMINATION
Constitutional: Awake, Alert, non-toxic. No acute distress.  HEAD: Normocephalic, atraumatic.   EYES: PERRL, EOM intact, conjunctiva and sclera are clear bilaterally.  ENT: External ears normal. No rhinorrhea, no tracheal deviation, no oropharyngeal edema/erythema  NECK: Supple, non-tender  CARDIOVASCULAR: tachycardic rate and rhythm.  RESPIRATORY: Normal respiratory effort; breath sounds CTAB, no wheezes, rhonchi, or rales. Speaking in full sentences. No accessory muscle use.   ABDOMEN: Soft; non-tender, non-distended. No rebound or guarding.   MSK:  no lower extremity edema, no deformities  SKIN: Warm, dry  NEURO: A&O x3. Sensory and motor functions are grossly intact. Speech is normal. No facial droop.  PSYCH: denies SI/HI. + auditory hallucinations, conversational

## 2023-11-14 NOTE — ED CDU PROVIDER INITIAL DAY NOTE - CLINICAL SUMMARY MEDICAL DECISION MAKING FREE TEXT BOX
Pt requesting BH evaluation due to lowering of medication and auditory hallucinations. Currently not suicidal. Sore throat likely viral pharyngitis, though will swab for Strep. Plan for medical clearance and  evaluation. BH called and given sign out on patient.

## 2023-11-14 NOTE — CHART NOTE - NSCHARTNOTEFT_GEN_A_CORE
SW Note: SW alerted by BH team pt is cleared psychiatrically, needs follow up w/ [facility]. SW met w/ pt at bedside, pt reports he has follow upin person tomorrow w/ [facility] CI at 11am, ED Provider aware no further SW/BH services needed

## 2023-11-14 NOTE — ED CDU PROVIDER DISPOSITION NOTE - PATIENT PORTAL LINK FT
You can access the FollowMyHealth Patient Portal offered by [facility] by registering at the following website: [email or web address]. By joining [facility]’s FollowMyHealth portal, you will also be able to view your health information using other applications (apps) compatible with our system.
Essential hypertension
PROVIDER:[TOKEN:[[id number]],FOLLOWUP:[1-3 Days]],PROVIDER:[TOKEN:[[id number]]]

## 2023-11-14 NOTE — ED BEHAVIORAL HEALTH PROGRESS NOTE - SUMMARY
48 year old single domiciled unemployed on ssd man with pph schizophrenia anxiety multiple prior admissions fu through [facility], pmh restless leg who self presents to hospital for psychiatric evaluation.  Endorses mild symptoms of psychosis which has been ongoing for several weeks.  Amenable to one time lorazepam and fluphenazine. Awaiting collateral.  Will re assess for final disposition.

## 2023-11-14 NOTE — ED ADULT NURSE REASSESSMENT NOTE - NSFALLHARMRISKINTERV_ED_ALL_ED

## 2023-11-14 NOTE — ED CDU PROVIDER INITIAL DAY NOTE - OBJECTIVE STATEMENT
49 y/o male with pmhx of schizophrenia, c/o psychiatric evaluation. Pt states asked psychiatrist Dr. Burks to decrease psych medication who did and since has been feeling out of it. Pt also c/o sore throat which improved after taking cough drops. Denies fever but states was sweating last night. + auditory hallucinations. Denies visual hallucinations, SI/HI. denies hx of suicidal attempts.

## 2023-11-14 NOTE — ED CDU PROVIDER DISPOSITION NOTE - CLINICAL COURSE
The patient seen by  and cleared for discharge and the patient was evaluated for pneumonia and got treated and now appears well and Vs showing Pulse ox of 93-98% RA and HR in 90's.  Will dc home on oral antibiotics and will follow up

## 2023-11-19 LAB
CULTURE RESULTS: SIGNIFICANT CHANGE UP
SPECIMEN SOURCE: SIGNIFICANT CHANGE UP

## 2023-12-06 ENCOUNTER — NON-APPOINTMENT (OUTPATIENT)
Age: 48
End: 2023-12-06

## 2023-12-06 ENCOUNTER — INPATIENT (INPATIENT)
Facility: HOSPITAL | Age: 48
LOS: 28 days | Discharge: HOME | DRG: 885 | End: 2024-01-04
Attending: PSYCHIATRY & NEUROLOGY | Admitting: PSYCHIATRY & NEUROLOGY
Payer: MEDICARE

## 2023-12-06 VITALS — HEIGHT: 68 IN | WEIGHT: 244.93 LBS

## 2023-12-06 DIAGNOSIS — F20.9 SCHIZOPHRENIA, UNSPECIFIED: ICD-10-CM

## 2023-12-06 LAB
ALBUMIN SERPL ELPH-MCNC: 4.1 G/DL — SIGNIFICANT CHANGE UP (ref 3.3–5)
ALBUMIN SERPL ELPH-MCNC: 4.1 G/DL — SIGNIFICANT CHANGE UP (ref 3.3–5)
ALP SERPL-CCNC: 74 U/L — SIGNIFICANT CHANGE UP (ref 40–120)
ALP SERPL-CCNC: 74 U/L — SIGNIFICANT CHANGE UP (ref 40–120)
ALT FLD-CCNC: 47 U/L — SIGNIFICANT CHANGE UP (ref 12–78)
ALT FLD-CCNC: 47 U/L — SIGNIFICANT CHANGE UP (ref 12–78)
AMPHET UR-MCNC: NEGATIVE — SIGNIFICANT CHANGE UP
AMPHET UR-MCNC: NEGATIVE — SIGNIFICANT CHANGE UP
ANION GAP SERPL CALC-SCNC: 8 MMOL/L — SIGNIFICANT CHANGE UP (ref 5–17)
ANION GAP SERPL CALC-SCNC: 8 MMOL/L — SIGNIFICANT CHANGE UP (ref 5–17)
APAP SERPL-MCNC: <2 UG/ML — LOW (ref 10–30)
APAP SERPL-MCNC: <2 UG/ML — LOW (ref 10–30)
APPEARANCE UR: CLEAR — SIGNIFICANT CHANGE UP
APPEARANCE UR: CLEAR — SIGNIFICANT CHANGE UP
AST SERPL-CCNC: 16 U/L — SIGNIFICANT CHANGE UP (ref 15–37)
AST SERPL-CCNC: 16 U/L — SIGNIFICANT CHANGE UP (ref 15–37)
BACTERIA # UR AUTO: NEGATIVE /HPF — SIGNIFICANT CHANGE UP
BACTERIA # UR AUTO: NEGATIVE /HPF — SIGNIFICANT CHANGE UP
BARBITURATES UR SCN-MCNC: NEGATIVE — SIGNIFICANT CHANGE UP
BARBITURATES UR SCN-MCNC: NEGATIVE — SIGNIFICANT CHANGE UP
BASOPHILS # BLD AUTO: 0.04 K/UL — SIGNIFICANT CHANGE UP (ref 0–0.2)
BASOPHILS # BLD AUTO: 0.04 K/UL — SIGNIFICANT CHANGE UP (ref 0–0.2)
BASOPHILS NFR BLD AUTO: 0.5 % — SIGNIFICANT CHANGE UP (ref 0–2)
BASOPHILS NFR BLD AUTO: 0.5 % — SIGNIFICANT CHANGE UP (ref 0–2)
BENZODIAZ UR-MCNC: NEGATIVE — SIGNIFICANT CHANGE UP
BENZODIAZ UR-MCNC: NEGATIVE — SIGNIFICANT CHANGE UP
BILIRUB SERPL-MCNC: 0.6 MG/DL — SIGNIFICANT CHANGE UP (ref 0.2–1.2)
BILIRUB SERPL-MCNC: 0.6 MG/DL — SIGNIFICANT CHANGE UP (ref 0.2–1.2)
BILIRUB UR-MCNC: NEGATIVE — SIGNIFICANT CHANGE UP
BILIRUB UR-MCNC: NEGATIVE — SIGNIFICANT CHANGE UP
BUN SERPL-MCNC: 7 MG/DL — SIGNIFICANT CHANGE UP (ref 7–23)
BUN SERPL-MCNC: 7 MG/DL — SIGNIFICANT CHANGE UP (ref 7–23)
CALCIUM SERPL-MCNC: 9.6 MG/DL — SIGNIFICANT CHANGE UP (ref 8.5–10.1)
CALCIUM SERPL-MCNC: 9.6 MG/DL — SIGNIFICANT CHANGE UP (ref 8.5–10.1)
CAST: 0 /LPF — SIGNIFICANT CHANGE UP (ref 0–4)
CAST: 0 /LPF — SIGNIFICANT CHANGE UP (ref 0–4)
CHLORIDE SERPL-SCNC: 105 MMOL/L — SIGNIFICANT CHANGE UP (ref 96–108)
CHLORIDE SERPL-SCNC: 105 MMOL/L — SIGNIFICANT CHANGE UP (ref 96–108)
CO2 SERPL-SCNC: 27 MMOL/L — SIGNIFICANT CHANGE UP (ref 22–31)
CO2 SERPL-SCNC: 27 MMOL/L — SIGNIFICANT CHANGE UP (ref 22–31)
COCAINE METAB.OTHER UR-MCNC: NEGATIVE — SIGNIFICANT CHANGE UP
COCAINE METAB.OTHER UR-MCNC: NEGATIVE — SIGNIFICANT CHANGE UP
COLOR SPEC: YELLOW — SIGNIFICANT CHANGE UP
COLOR SPEC: YELLOW — SIGNIFICANT CHANGE UP
CREAT SERPL-MCNC: 0.96 MG/DL — SIGNIFICANT CHANGE UP (ref 0.5–1.3)
CREAT SERPL-MCNC: 0.96 MG/DL — SIGNIFICANT CHANGE UP (ref 0.5–1.3)
DIFF PNL FLD: ABNORMAL
DIFF PNL FLD: ABNORMAL
EGFR: 99 ML/MIN/1.73M2 — SIGNIFICANT CHANGE UP
EGFR: 99 ML/MIN/1.73M2 — SIGNIFICANT CHANGE UP
EOSINOPHIL # BLD AUTO: 0.15 K/UL — SIGNIFICANT CHANGE UP (ref 0–0.5)
EOSINOPHIL # BLD AUTO: 0.15 K/UL — SIGNIFICANT CHANGE UP (ref 0–0.5)
EOSINOPHIL NFR BLD AUTO: 2 % — SIGNIFICANT CHANGE UP (ref 0–6)
EOSINOPHIL NFR BLD AUTO: 2 % — SIGNIFICANT CHANGE UP (ref 0–6)
ETHANOL SERPL-MCNC: <10 MG/DL — SIGNIFICANT CHANGE UP (ref 0–10)
ETHANOL SERPL-MCNC: <10 MG/DL — SIGNIFICANT CHANGE UP (ref 0–10)
GLUCOSE SERPL-MCNC: 227 MG/DL — HIGH (ref 70–99)
GLUCOSE SERPL-MCNC: 227 MG/DL — HIGH (ref 70–99)
GLUCOSE UR QL: NEGATIVE MG/DL — SIGNIFICANT CHANGE UP
GLUCOSE UR QL: NEGATIVE MG/DL — SIGNIFICANT CHANGE UP
HCT VFR BLD CALC: 41.6 % — SIGNIFICANT CHANGE UP (ref 39–50)
HCT VFR BLD CALC: 41.6 % — SIGNIFICANT CHANGE UP (ref 39–50)
HGB BLD-MCNC: 14.8 G/DL — SIGNIFICANT CHANGE UP (ref 13–17)
HGB BLD-MCNC: 14.8 G/DL — SIGNIFICANT CHANGE UP (ref 13–17)
IMM GRANULOCYTES NFR BLD AUTO: 0.3 % — SIGNIFICANT CHANGE UP (ref 0–0.9)
IMM GRANULOCYTES NFR BLD AUTO: 0.3 % — SIGNIFICANT CHANGE UP (ref 0–0.9)
KETONES UR-MCNC: NEGATIVE MG/DL — SIGNIFICANT CHANGE UP
KETONES UR-MCNC: NEGATIVE MG/DL — SIGNIFICANT CHANGE UP
LEUKOCYTE ESTERASE UR-ACNC: NEGATIVE — SIGNIFICANT CHANGE UP
LEUKOCYTE ESTERASE UR-ACNC: NEGATIVE — SIGNIFICANT CHANGE UP
LYMPHOCYTES # BLD AUTO: 2.13 K/UL — SIGNIFICANT CHANGE UP (ref 1–3.3)
LYMPHOCYTES # BLD AUTO: 2.13 K/UL — SIGNIFICANT CHANGE UP (ref 1–3.3)
LYMPHOCYTES # BLD AUTO: 28.2 % — SIGNIFICANT CHANGE UP (ref 13–44)
LYMPHOCYTES # BLD AUTO: 28.2 % — SIGNIFICANT CHANGE UP (ref 13–44)
MCHC RBC-ENTMCNC: 30.5 PG — SIGNIFICANT CHANGE UP (ref 27–34)
MCHC RBC-ENTMCNC: 30.5 PG — SIGNIFICANT CHANGE UP (ref 27–34)
MCHC RBC-ENTMCNC: 35.6 GM/DL — SIGNIFICANT CHANGE UP (ref 32–36)
MCHC RBC-ENTMCNC: 35.6 GM/DL — SIGNIFICANT CHANGE UP (ref 32–36)
MCV RBC AUTO: 85.6 FL — SIGNIFICANT CHANGE UP (ref 80–100)
MCV RBC AUTO: 85.6 FL — SIGNIFICANT CHANGE UP (ref 80–100)
METHADONE UR-MCNC: NEGATIVE — SIGNIFICANT CHANGE UP
METHADONE UR-MCNC: NEGATIVE — SIGNIFICANT CHANGE UP
MONOCYTES # BLD AUTO: 0.3 K/UL — SIGNIFICANT CHANGE UP (ref 0–0.9)
MONOCYTES # BLD AUTO: 0.3 K/UL — SIGNIFICANT CHANGE UP (ref 0–0.9)
MONOCYTES NFR BLD AUTO: 4 % — SIGNIFICANT CHANGE UP (ref 2–14)
MONOCYTES NFR BLD AUTO: 4 % — SIGNIFICANT CHANGE UP (ref 2–14)
NEUTROPHILS # BLD AUTO: 4.91 K/UL — SIGNIFICANT CHANGE UP (ref 1.8–7.4)
NEUTROPHILS # BLD AUTO: 4.91 K/UL — SIGNIFICANT CHANGE UP (ref 1.8–7.4)
NEUTROPHILS NFR BLD AUTO: 65 % — SIGNIFICANT CHANGE UP (ref 43–77)
NEUTROPHILS NFR BLD AUTO: 65 % — SIGNIFICANT CHANGE UP (ref 43–77)
NITRITE UR-MCNC: NEGATIVE — SIGNIFICANT CHANGE UP
NITRITE UR-MCNC: NEGATIVE — SIGNIFICANT CHANGE UP
OPIATES UR-MCNC: NEGATIVE — SIGNIFICANT CHANGE UP
OPIATES UR-MCNC: NEGATIVE — SIGNIFICANT CHANGE UP
PCP SPEC-MCNC: SIGNIFICANT CHANGE UP
PCP SPEC-MCNC: SIGNIFICANT CHANGE UP
PCP UR-MCNC: NEGATIVE — SIGNIFICANT CHANGE UP
PCP UR-MCNC: NEGATIVE — SIGNIFICANT CHANGE UP
PH UR: 6 — SIGNIFICANT CHANGE UP (ref 5–8)
PH UR: 6 — SIGNIFICANT CHANGE UP (ref 5–8)
PLATELET # BLD AUTO: 269 K/UL — SIGNIFICANT CHANGE UP (ref 150–400)
PLATELET # BLD AUTO: 269 K/UL — SIGNIFICANT CHANGE UP (ref 150–400)
POTASSIUM SERPL-MCNC: 4 MMOL/L — SIGNIFICANT CHANGE UP (ref 3.5–5.3)
POTASSIUM SERPL-MCNC: 4 MMOL/L — SIGNIFICANT CHANGE UP (ref 3.5–5.3)
POTASSIUM SERPL-SCNC: 4 MMOL/L — SIGNIFICANT CHANGE UP (ref 3.5–5.3)
POTASSIUM SERPL-SCNC: 4 MMOL/L — SIGNIFICANT CHANGE UP (ref 3.5–5.3)
PROT SERPL-MCNC: 7.6 GM/DL — SIGNIFICANT CHANGE UP (ref 6–8.3)
PROT SERPL-MCNC: 7.6 GM/DL — SIGNIFICANT CHANGE UP (ref 6–8.3)
PROT UR-MCNC: NEGATIVE MG/DL — SIGNIFICANT CHANGE UP
PROT UR-MCNC: NEGATIVE MG/DL — SIGNIFICANT CHANGE UP
RBC # BLD: 4.86 M/UL — SIGNIFICANT CHANGE UP (ref 4.2–5.8)
RBC # BLD: 4.86 M/UL — SIGNIFICANT CHANGE UP (ref 4.2–5.8)
RBC # FLD: 12.5 % — SIGNIFICANT CHANGE UP (ref 10.3–14.5)
RBC # FLD: 12.5 % — SIGNIFICANT CHANGE UP (ref 10.3–14.5)
RBC CASTS # UR COMP ASSIST: 1 /HPF — SIGNIFICANT CHANGE UP (ref 0–4)
RBC CASTS # UR COMP ASSIST: 1 /HPF — SIGNIFICANT CHANGE UP (ref 0–4)
SALICYLATES SERPL-MCNC: <1.7 MG/DL — LOW (ref 2.8–20)
SALICYLATES SERPL-MCNC: <1.7 MG/DL — LOW (ref 2.8–20)
SARS-COV-2 RNA SPEC QL NAA+PROBE: SIGNIFICANT CHANGE UP
SARS-COV-2 RNA SPEC QL NAA+PROBE: SIGNIFICANT CHANGE UP
SODIUM SERPL-SCNC: 140 MMOL/L — SIGNIFICANT CHANGE UP (ref 135–145)
SODIUM SERPL-SCNC: 140 MMOL/L — SIGNIFICANT CHANGE UP (ref 135–145)
SP GR SPEC: 1 — SIGNIFICANT CHANGE UP (ref 1–1.03)
SP GR SPEC: 1 — SIGNIFICANT CHANGE UP (ref 1–1.03)
SQUAMOUS # UR AUTO: 0 /HPF — SIGNIFICANT CHANGE UP (ref 0–5)
SQUAMOUS # UR AUTO: 0 /HPF — SIGNIFICANT CHANGE UP (ref 0–5)
THC UR QL: NEGATIVE — SIGNIFICANT CHANGE UP
THC UR QL: NEGATIVE — SIGNIFICANT CHANGE UP
UROBILINOGEN FLD QL: 0.2 MG/DL — SIGNIFICANT CHANGE UP (ref 0.2–1)
UROBILINOGEN FLD QL: 0.2 MG/DL — SIGNIFICANT CHANGE UP (ref 0.2–1)
WBC # BLD: 7.55 K/UL — SIGNIFICANT CHANGE UP (ref 3.8–10.5)
WBC # BLD: 7.55 K/UL — SIGNIFICANT CHANGE UP (ref 3.8–10.5)
WBC # FLD AUTO: 7.55 K/UL — SIGNIFICANT CHANGE UP (ref 3.8–10.5)
WBC # FLD AUTO: 7.55 K/UL — SIGNIFICANT CHANGE UP (ref 3.8–10.5)
WBC UR QL: 2 /HPF — SIGNIFICANT CHANGE UP (ref 0–5)
WBC UR QL: 2 /HPF — SIGNIFICANT CHANGE UP (ref 0–5)

## 2023-12-06 PROCEDURE — 99285 EMERGENCY DEPT VISIT HI MDM: CPT

## 2023-12-06 RX ORDER — METFORMIN ER 500 MG 500 MG/1
500 TABLET ORAL
Qty: 360 | Refills: 0 | Status: DISCONTINUED | COMMUNITY
Start: 2023-05-23 | End: 2023-12-06

## 2023-12-06 NOTE — ED PROVIDER NOTE - PROGRESS NOTE DETAILS
Attending anup Oakley/w with psych NP Kaya and pt can be admitted to Dr. Cardenas service for psych.

## 2023-12-06 NOTE — ED ADULT NURSE REASSESSMENT NOTE - NS ED NURSE REASSESS COMMENT FT1
Received report from RN Nathalie Richey. Pt A&Ox4, resting comfortably. Pt denies any pain or discomfort. VS as charted, respirations equal and unlabored. Pending psych evaluation. Pt updated on plan of care, verbalized understanding. 1:1 remains in place for pt safety, pt calm and cooperative at this time. Safety measures maintained, stretcher locked and in lowest position, both side rails up.

## 2023-12-06 NOTE — ED PROVIDER NOTE - OBJECTIVE STATEMENT
49 y/o male with a PMHx of schizo affective disorder presents to the ED for SI. Pt reports 3 weeks ago  his psychiatrist, Dr. Burks changed his meds. Pt reports since then he has had worsening ability to think straight and SI. Pt states "I feel like I had a partial lobectomy." Denies action to hurt himself. NO EtOH use. Pt recently started smoking today. No other complaints at this time. 47 y/o male with a PMHx of schizo affective disorder presents to the ED for SI. Pt reports 3 weeks ago  his psychiatrist, Dr. Burks changed his meds. Pt reports since then he has had worsening ability to think straight and SI. Pt states "I feel like I had a partial lobectomy." Denies action to hurt himself. NO EtOH use. Pt recently started smoking today. No other complaints at this time.

## 2023-12-06 NOTE — ED ADULT NURSE NOTE - NS ED NURSE REPORT GIVEN DT
Problem: Pain Management  Goal: Pain level will decrease to patient's comfort goal  Outcome: PROGRESSING AS EXPECTED  Pain controlled with PRN medication.       Problem: Respiratory:  Goal: Respiratory status will improve  Outcome: PROGRESSING AS EXPECTED   Comfortable on 3L NC oxygen patient states at baseline performs IS well.     08-Dec-2023 15:26

## 2023-12-06 NOTE — ED ADULT NURSE NOTE - OBJECTIVE STATEMENT
Pt presents to ED w c/o suicidal thoughts. PMH of schizophrenia. Pt states that he has had a rough couple of months and this past week he developed suicidal thoughts. Pts medications have changed recently and he has not felt the same since. Pt denies HI or previous self harm attempts. + auditory and visual hallucinations. Labs obtained, laying in bed watching tv with no other concerns at this time. Constant observations initiated.

## 2023-12-06 NOTE — ED ADULT TRIAGE NOTE - CHIEF COMPLAINT QUOTE
pt present to ED c/o suicidal thoughts. pt reports she was taken off Clozaril and started on seroquel 10 says ago. since then he reports unable to "think straight" and having palpitations. Denies HI

## 2023-12-06 NOTE — ED ADULT NURSE NOTE - NSFALLUNIVINTERV_ED_ALL_ED
Bed/Stretcher in lowest position, wheels locked, appropriate side rails in place/Call bell, personal items and telephone in reach/Instruct patient to call for assistance before getting out of bed/chair/stretcher/Non-slip footwear applied when patient is off stretcher/[place] to call system/Physically safe environment - no spills, clutter or unnecessary equipment/Purposeful proactive rounding/Room/bathroom lighting operational, light cord in reach Bed/Stretcher in lowest position, wheels locked, appropriate side rails in place/Call bell, personal items and telephone in reach/Instruct patient to call for assistance before getting out of bed/chair/stretcher/Non-slip footwear applied when patient is off stretcher/[place] to call system/Physically safe environment - no spills, clutter or unnecessary equipment/Purposeful proactive rounding/Room/bathroom lighting operational, light cord in reach

## 2023-12-06 NOTE — ED PROVIDER NOTE - IV ALTEPLASE ADMIN OUTSIDE HIDDEN
"        Manjit Johnson   2017 5:30 PM   Office Visit   MRN: [id number]    Department:  [facility]   Dept Phone:  [phone]    Description:  Male : 1991   Provider:  Tobin Villegas PA-C           Reason for Visit     Otalgia x2days, ear pain mostly on right ear, hurts most when laying down, scratchy throat, headache      Allergies as of 2017     Allergen Noted Reactions    Other Misc 2015       Pollens , cats , dogs .      You were diagnosed with     Otalgia, unspecified laterality   [220525]         Vital Signs     Blood Pressure Pulse Temperature Respirations Height Weight    120/88 mmHg 60 36.9 °C (98.4 °F) 14 1.753 m (5' 9.02\") 79.47 kg (175 lb 3.2 oz)    Body Mass Index Oxygen Saturation Smoking Status             25.86 kg/m2 96% Never Smoker          Basic Information     Date Of Birth Sex Race Ethnicity Preferred Language    1991 Male White Non- English      Health Maintenance        Date Due Completion Dates    IMM HEP B VACCINE (1 of 3 - Primary Series) 1993 ---    IMM HEP A VACCINE (1 of 2 - Standard Series) 1994 ---    IMM HPV VACCINE (1 of 3 - Male 3 Dose Series) 2004 ---    IMM VARICELLA (CHICKENPOX) VACCINE (1 of 2 - 2 Dose Adolescent Series) 2006 ---    IMM DTaP/Tdap/Td Vaccine (1 - Tdap) 2012 ---            Current Immunizations     No immunizations on file.      Below and/or attached are the medications your provider expects you to take. Review all of your home medications and newly ordered medications with your provider and/or pharmacist. Follow medication instructions as directed by your provider and/or pharmacist. Please keep your medication list with you and share with your provider. Update the information when medications are discontinued, doses are changed, or new medications (including over-the-counter products) are added; and carry medication information at all times in the event of emergency situations     Allergies:  " OTHER MISC - (reactions not documented)               Medications  Valid as of: April 25, 2017 -  6:12 PM    Generic Name Brand Name Tablet Size Instructions for use    Fexofenadine HCl   Take  by mouth.        Fluticasone Propionate (Suspension) FLONASE 50 MCG/ACT Spray 1 Spray in nose 2 Times a Day. Each Nostril         Fluticasone Propionate (Suspension) FLONASE 50 MCG/ACT Spray 2 Sprays in nose every day.        NS SOLN 60 mL with albuterol 2.5 mg/0.5 mL NEBU 5 mL   5 mg/hr by Nebulization route.        .                 Medicines prescribed today were sent to:     [id number] - [place], [place] - [address]    [address] [place] [address]    Phone: [phone] Fax: [phone]    Open 24 Hours?: No      Medication refill instructions:       If your prescription bottle indicates you have medication refills left, it is not necessary to call your provider’s office. Please contact your pharmacy and they will refill your medication.    If your prescription bottle indicates you do not have any refills left, you may request refills at any time through one of the following ways: The online [employer] system (except Urgent Care), by calling your provider’s office, or by asking your pharmacy to contact your provider’s office with a refill request. Medication refills are processed only during regular business hours and may not be available until the next business day. Your provider may request additional information or to have a follow-up visit with you prior to refilling your medication.   *Please Note: Medication refills are assigned a new Rx number when refilled electronically. Your pharmacy may indicate that no refills were authorized even though a new prescription for the same medication is available at the pharmacy. Please request the medicine by name with the pharmacy before contacting your provider for a refill.        Referral     A referral request has been sent to our patient care coordination department.  show Please allow 3-5 business days for us to process this request and contact you either by phone or mail. If you do not hear from us by the 5th business day, please call us at [phone].           GITR Access Code: [email or web address]  Expires: 5/25/2017  6:12 PM    GITR  A secure, online tool to manage your health information     [employer]’s GITR® is a secure, online tool that connects you to your personalized health information from the privacy of your home -- day or night - making it very easy for you to manage your healthcare. Once the activation process is completed, you can even access your medical information using the GITR nadja, which is available for free in the [employer] Nadja store or Google Play store.     GITR provides the following levels of access (as shown below):   My Chart Features   [facility] Doctor [facility]  Specialists [facility]  Urgent  Care [facility]  Primary Care  Doctor   Email your healthcare team securely and privately 24/7 X X X    Manage appointments: schedule your next appointment; view details of past/upcoming appointments X      Request prescription refills. X      View recent personal medical records, including lab and immunizations X X X X   View health record, including health history, allergies, medications X X X X   Read reports about your outpatient visits, procedures, consult and ER notes X X X X   See your discharge summary, which is a recap of your hospital and/or ER visit that includes your diagnosis, lab results, and care plan. X X       How to register for GITR:  1. Go to  [email or web address].  2. Click on the Sign Up Now box, which takes you to the New Member Sign Up page. You will need to provide the following information:  a. Enter your GITR Access Code exactly as it appears at the top of this page. (You will not need to use this code after you’ve completed the sign-up process. If you do not sign up before the expiration date, you must  request a new code.)   b. Enter your date of birth.   c. Enter your home email address.   d. Click Submit, and follow the next screen’s instructions.  3. Create a DOOMOROt ID. This will be your [employer]asia[employer] login ID and cannot be changed, so think of one that is secure and easy to remember.  4. Create a DOOMOROt password. You can change your password at any time.  5. Enter your Password Reset Question and Answer. This can be used at a later time if you forget your password.   6. Enter your e-mail address. This allows you to receive e-mail notifications when new information is available in Easiaid.  7. Click Sign Up. You can now view your health information.    For assistance activating your [employer]asiaid account, call [phone]

## 2023-12-06 NOTE — ED ADULT TRIAGE NOTE - PRO INTERPRETER NEED 2
Recs:  Check CT of sinuses to screen for chronic sinusitis given his persistent symptoms including postnasal drip and gagging  Checks barium swallow/upper GI to screen for reflux or other anatomical issues that may contribute to his gagging given his histo English

## 2023-12-06 NOTE — ED PROVIDER NOTE - PHYSICAL EXAMINATION
Constitutional: NAD AOx3  Eyes: PERRL EOMI  Head: Normocephalic atraumatic  Mouth: MMM  Cardiac: regular rate and rhythm  Resp: Lungs CTAB  GI: Abd s/nd/nt  Neuro: CN2-12 grossly intact, RUFF x 4  Skin: No visible rashes   Psych: +SI

## 2023-12-07 DIAGNOSIS — F25.9 SCHIZOAFFECTIVE DISORDER, UNSPECIFIED: ICD-10-CM

## 2023-12-07 LAB
VALPROATE SERPL-MCNC: 13 UG/ML — LOW (ref 50–100)
VALPROATE SERPL-MCNC: 13 UG/ML — LOW (ref 50–100)

## 2023-12-07 RX ORDER — LANOLIN ALCOHOL/MO/W.PET/CERES
3 CREAM (GRAM) TOPICAL AT BEDTIME
Refills: 0 | Status: DISCONTINUED | OUTPATIENT
Start: 2023-12-07 | End: 2024-01-04

## 2023-12-07 RX ORDER — DIVALPROEX SODIUM 500 MG/1
500 TABLET, DELAYED RELEASE ORAL ONCE
Refills: 0 | Status: COMPLETED | OUTPATIENT
Start: 2023-12-07 | End: 2023-12-07

## 2023-12-07 RX ORDER — HYDROXYZINE HCL 10 MG
25 TABLET ORAL EVERY 6 HOURS
Refills: 0 | Status: DISCONTINUED | OUTPATIENT
Start: 2023-12-07 | End: 2024-01-04

## 2023-12-07 RX ORDER — NICOTINE POLACRILEX 2 MG
4 GUM BUCCAL ONCE
Refills: 0 | Status: COMPLETED | OUTPATIENT
Start: 2023-12-07 | End: 2023-12-07

## 2023-12-07 RX ORDER — BENZTROPINE MESYLATE 1 MG
1 TABLET ORAL ONCE
Refills: 0 | Status: COMPLETED | OUTPATIENT
Start: 2023-12-08 | End: 2023-12-08

## 2023-12-07 RX ORDER — DIVALPROEX SODIUM 500 MG/1
500 TABLET, DELAYED RELEASE ORAL DAILY
Refills: 0 | Status: DISCONTINUED | OUTPATIENT
Start: 2023-12-08 | End: 2024-01-04

## 2023-12-07 RX ORDER — NICOTINE POLACRILEX 2 MG
2 GUM BUCCAL
Refills: 0 | Status: DISCONTINUED | OUTPATIENT
Start: 2023-12-07 | End: 2023-12-07

## 2023-12-07 RX ORDER — QUETIAPINE FUMARATE 200 MG/1
100 TABLET, FILM COATED ORAL AT BEDTIME
Refills: 0 | Status: DISCONTINUED | OUTPATIENT
Start: 2023-12-07 | End: 2023-12-11

## 2023-12-07 RX ORDER — LANOLIN ALCOHOL/MO/W.PET/CERES
5 CREAM (GRAM) TOPICAL ONCE
Refills: 0 | Status: COMPLETED | OUTPATIENT
Start: 2023-12-07 | End: 2023-12-07

## 2023-12-07 RX ORDER — NICOTINE POLACRILEX 2 MG
2 GUM BUCCAL
Refills: 0 | Status: DISCONTINUED | OUTPATIENT
Start: 2023-12-07 | End: 2024-01-04

## 2023-12-07 RX ADMIN — Medication 2 MILLIGRAM(S): at 16:55

## 2023-12-07 RX ADMIN — Medication 2 MILLIGRAM(S): at 12:37

## 2023-12-07 RX ADMIN — Medication 2 MILLIGRAM(S): at 20:02

## 2023-12-07 RX ADMIN — Medication 2 MILLIGRAM(S): at 14:51

## 2023-12-07 RX ADMIN — Medication 5 MILLIGRAM(S): at 02:01

## 2023-12-07 RX ADMIN — Medication 25 MILLIGRAM(S): at 12:36

## 2023-12-07 RX ADMIN — DIVALPROEX SODIUM 500 MILLIGRAM(S): 500 TABLET, DELAYED RELEASE ORAL at 14:50

## 2023-12-07 NOTE — ED ADULT NURSE REASSESSMENT NOTE - NS ED NURSE REASSESS COMMENT FT1
Received report from STEPHON Rashid. Pt resting in stretcher. Resp. even and unlabored. Admits to some anxiety after speaking with psych NP. ISADORA Kirkpatrick aware and awaiting medication. As per ISADORA Kirkaptrick, pt to be voluntarily admitted. VS as noted. 1:1 continues to be at bedside. In NAD. Received report from STEPHON Rashid. Pt resting in stretcher. Resp. even and unlabored. Admits to some anxiety after speaking with psych NP. ISADORA Kirkpatrick aware and awaiting medication. As per ISADORA Kirkpatrick, pt to be voluntarily admitted. VS as noted. 1:1 continues to be at bedside. In NAD.

## 2023-12-07 NOTE — ED BEHAVIORAL HEALTH ASSESSMENT NOTE - SUMMARY
48 year old single, domiciled [place], unemployed, on disability. PPHx of schizophrenia, anxiety multiple prior admissions last in 2012 at [facility]. Was previously on Clozaril 50 mg, doing well aside from feeling tired, changed psychiatrist in July and is now on Depakote and Seroquel, reports symptoms have worsened on this regimen. PMHx of HTN, HDL and restless leg who self presents to hospital for psychiatric evaluation.    Patient presents with Schizoaffective disorder with exacerbation of symptoms since recent medication change. No SI/HI. +AH, not command in nature. +Vague paranoia and delusions. These symptoms represent a change from baseline from which the patient cannot be reasonably expected to improve with current level of care. The patient is requesting voluntary inpatient psychiatric hospitalization for safety and stabilization of psychiatric symptoms. 48 year old single, domiciled [place], unemployed, on disability. PPHx of schizophrenia, anxiety multiple prior admissions last in 2012 at [facility]. Was previously on Clozaril 50 mg, doing well aside from feeling tired, changed psychiatrist in July and is now on Depakote and Seroquel, reports symptoms have worsened on this regimen. PMHx of HTN, HDL and restless leg who self presents to hospital for psychiatric evaluation.    Patient presents with Schizoaffective disorder with exacerbation of symptoms since recent medication change. No SI/HI. +AH, not command in nature. +Vague paranoia and delusions. These symptoms represent a change from baseline from which the patient cannot be reasonably expected to improve with current level of care. The patient is requesting voluntary inpatient psychiatric hospitalization for safety and stabilization of psychiatric symptoms.

## 2023-12-07 NOTE — ED BEHAVIORAL HEALTH ASSESSMENT NOTE - DESCRIPTION
restless leg, HTN, HDL single no children unemployed on ssd Vital Signs Last 24 Hrs  T(C): 36.5 (07 Dec 2023 11:16), Max: 37.3 (06 Dec 2023 16:14)  T(F): 97.7 (07 Dec 2023 11:16), Max: 99.2 (06 Dec 2023 16:14)  HR: 76 (07 Dec 2023 11:16) (72 - 94)  BP: 155/91 (07 Dec 2023 11:16) (131/81 - 166/90)  BP(mean): 97 (07 Dec 2023 04:07) (97 - 107)  RR: 18 (07 Dec 2023 11:16) (18 - 18)  SpO2: 96% (07 Dec 2023 11:16) (95% - 96%)    Parameters below as of 07 Dec 2023 11:16  Patient On (Oxygen Delivery Method): room air

## 2023-12-07 NOTE — ED ADULT NURSE REASSESSMENT NOTE - NS ED NURSE REASSESS COMMENT FT1
Received report from SETPHON Pizano. Constant observation maintained. Pt has no complaints at this time. Safety and comfort measures continued. Received report from STEPHON Pizano. Constant observation maintained. Pt has no complaints at this time. Safety and comfort measures continued.

## 2023-12-07 NOTE — ED BEHAVIORAL HEALTH ASSESSMENT NOTE - CURRENT MEDICATION
confirmed through [facility] pharmacy depakote 500 mg daily, lipitor 40 mg nightly, benztropine 1 mg daily, metformin er 2000 mg with dinner, vitamin d confirmed through [facility] pharmacy depakote 500 mg daily, lipitor 40 mg nightly, benztropine 1 mg daily, metformin er 2000 mg with dinner, vitamin d

## 2023-12-07 NOTE — ED ADULT NURSE REASSESSMENT NOTE - NS ED NURSE REASSESS COMMENT FT1
Pt sitting in bed awake and restless. Pt asked for nicotine gum. Awaiting gum from pharmacy. Pt going for walk with 1:1. Pt calm and cooperative.

## 2023-12-07 NOTE — ED BEHAVIORAL HEALTH ASSESSMENT NOTE - PATIENT'S CHIEF COMPLAINT
"Ever since I got off the Clozaril my head hasn't been right, I am having really bad thoughts, really bizarre and hearing voices telling me I killed people when I was younger and I'm a pedophile."

## 2023-12-07 NOTE — ED BEHAVIORAL HEALTH ASSESSMENT NOTE - OTHER PAST PSYCHIATRIC HISTORY (INCLUDE DETAILS REGARDING ONSET, COURSE OF ILLNESS, INPATIENT/OUTPATIENT TREATMENT)
History of schizophrenia multiple prior admissions  fu with [facility], meds through [facility] pharmacy History of schizophrenia multiple prior admissions  fu with [facility] league, meds through [facility] pharmacy

## 2023-12-07 NOTE — ED BEHAVIORAL HEALTH ASSESSMENT NOTE - HPI (INCLUDE ILLNESS QUALITY, SEVERITY, DURATION, TIMING, CONTEXT, MODIFYING FACTORS, ASSOCIATED SIGNS AND SYMPTOMS)
48 year old single, domiciled [place], unemployed, on disability. PPHx of schizoaffective disorder, anxiety multiple prior admissions last in 2012 at [facility]. Was previously on Clozaril 50 mg, doing well aside from feeling tired, changed psychiatrist in July and is now on Depakote and Seroquel, reports symptoms have worsened on this regimen. PMHx of HTN, HDL and restless leg who self presents to hospital for psychiatric evaluation.    During encounter, pleasant calm cooperative. Does not appear particularly distressed dysphoric or overtly manic or psychotic. Linear throughout. Reports feeling okay though states that he's been having vague paranoia and auditory hallucinations ongoing for several weeks-months despite compliance with medications. Follows through [facility] with Dr. Burks (unable to reach, discreet message left). Confirmed partial med list through [facility] pharmacy (patient adds that he also receives a Prolixin long acting injection, last in October.)  Adds that he was previously on clozapine 50 mg nightly though was discontinued for unclear reasons (believes that his provider did not think it necessary considering low dose vs frequent cbc monitoring). Valproic acid decreased from 1500 to 500 mg by his request. Prolixin 25 mg [id number]. No SI/HI. AH is non-command in nature, however are saying disturbing things to him, stating, "Ever since I got off the Clozaril my head hasn't been right, I am having really bad thoughts, really bizarre and hearing voices telling me I killed people when I was younger and I'm a pedophile." He also says for the past few days his brain has not been working and he thinks this is because the government maybe gave him a lobotomy many years ago. Relapsed on alcohol several weeks ago, last drink last November 14, 2023 2x 24 ounces beers. + No tobacco 1PPD, no drug use. 48 year old single, domiciled [place], unemployed, on disability. PPHx of schizoaffective disorder, anxiety multiple prior admissions last in 2012 at [facility]. Was previously on Clozaril 50 mg, doing well aside from feeling tired, changed psychiatrist in July and is now on Depakote and Seroquel, reports symptoms have worsened on this regimen. PMHx of HTN, HDL and restless leg who self presents to hospital for psychiatric evaluation.    During encounter, pleasant calm cooperative. Does not appear particularly distressed dysphoric or overtly manic or psychotic. Linear throughout. Reports feeling okay though states that he's been having vague paranoia and auditory hallucinations ongoing for several weeks-months despite compliance with medications. Follows through [facility] with Dr. Burks (unable to reach, discreet message left). Confirmed partial med list through [facility] pharmacy (patient adds that he also receives a Prolixin long acting injection, last in October.)  Adds that he was previously on clozapine 50 mg nightly though was discontinued for unclear reasons (believes that his provider did not think it necessary considering low dose vs frequent cbc monitoring). Valproic acid decreased from 1500 to 500 mg by his request. Prolixin 25 mg [id number]. No SI/HI. AH is non-command in nature, however are saying disturbing things to him, stating, "Ever since I got off the Clozaril my head hasn't been right, I am having really bad thoughts, really bizarre and hearing voices telling me I killed people when I was younger and I'm a pedophile." He also says for the past few days his brain has not been working and he thinks this is because the government maybe gave him a lobotomy many years ago. Relapsed on alcohol several weeks ago, last drink last November 14, 2023 2x 24 ounces beers. + No tobacco 1PPD, no drug use.

## 2023-12-07 NOTE — ED BEHAVIORAL HEALTH NOTE - BEHAVIORAL HEALTH NOTE
[place] reviewing patient's case for admission. [place] reviewing patient's case for admission.

## 2023-12-07 NOTE — ED BEHAVIORAL HEALTH ASSESSMENT NOTE - NSBHATTESTAPPBILLTIME_PSY_A_CORE
I attest my time as YAHAIRA is greater than 50% of the total combined time spent on qualifying patient care activities. I have reviewed and verified the documentation.

## 2023-12-07 NOTE — ED BEHAVIORAL HEALTH ASSESSMENT NOTE - NSBHATTESTBILLING_PSY_A_CORE
[id number] diagnostic evaluation with medical services [id number] diagnostic evaluation with medical services

## 2023-12-08 DIAGNOSIS — F25.9 SCHIZOAFFECTIVE DISORDER, UNSPECIFIED: ICD-10-CM

## 2023-12-08 PROCEDURE — 84484 ASSAY OF TROPONIN QUANT: CPT

## 2023-12-08 PROCEDURE — 80061 LIPID PANEL: CPT

## 2023-12-08 PROCEDURE — 85652 RBC SED RATE AUTOMATED: CPT

## 2023-12-08 PROCEDURE — 85025 COMPLETE CBC W/AUTO DIFF WBC: CPT

## 2023-12-08 PROCEDURE — 36415 COLL VENOUS BLD VENIPUNCTURE: CPT

## 2023-12-08 PROCEDURE — 83036 HEMOGLOBIN GLYCOSYLATED A1C: CPT

## 2023-12-08 PROCEDURE — 80164 ASSAY DIPROPYLACETIC ACD TOT: CPT

## 2023-12-08 PROCEDURE — 80159 DRUG ASSAY CLOZAPINE: CPT

## 2023-12-08 PROCEDURE — 84443 ASSAY THYROID STIM HORMONE: CPT

## 2023-12-08 RX ORDER — INFLUENZA VIRUS VACCINE 15; 15; 15; 15 UG/.5ML; UG/.5ML; UG/.5ML; UG/.5ML
0.5 SUSPENSION INTRAMUSCULAR ONCE
Refills: 0 | Status: DISCONTINUED | OUTPATIENT
Start: 2023-12-08 | End: 2024-01-04

## 2023-12-08 RX ADMIN — QUETIAPINE FUMARATE 100 MILLIGRAM(S): 200 TABLET, FILM COATED ORAL at 22:16

## 2023-12-08 RX ADMIN — DIVALPROEX SODIUM 500 MILLIGRAM(S): 500 TABLET, DELAYED RELEASE ORAL at 08:55

## 2023-12-08 RX ADMIN — Medication 3 MILLIGRAM(S): at 00:14

## 2023-12-08 RX ADMIN — QUETIAPINE FUMARATE 100 MILLIGRAM(S): 200 TABLET, FILM COATED ORAL at 00:14

## 2023-12-08 RX ADMIN — Medication 2 MILLIGRAM(S): at 09:23

## 2023-12-08 RX ADMIN — Medication 4 MILLIGRAM(S): at 00:21

## 2023-12-08 RX ADMIN — Medication 1 MILLIGRAM(S): at 08:51

## 2023-12-08 RX ADMIN — Medication 3 MILLIGRAM(S): at 22:16

## 2023-12-08 RX ADMIN — Medication 2 MILLIGRAM(S): at 18:19

## 2023-12-08 RX ADMIN — Medication 2 MILLIGRAM(S): at 11:11

## 2023-12-08 NOTE — BH SAFETY PLAN - ASK FOR HELP NAME 1
Dr. Burks at [facility] Dr. Burks at [facility]

## 2023-12-08 NOTE — ED BEHAVIORAL HEALTH PROGRESS NOTE - CASE SUMMARY/FORMULATION (CLEARLY DOCUMENT RATIONALE FOR DISPOSITION CHANGE)
48 year old single, domiciled [place], unemployed, on disability. PPHx of schizophrenia, anxiety multiple prior admissions last in 2012 at [facility]. Was previously on Clozaril 50 mg, doing well aside from feeling tired, changed psychiatrist in July and is now on Depakote and Seroquel, reports symptoms have worsened on this regimen. PMHx of HTN, HDL and restless leg who self presents to hospital for psychiatric evaluation.    Patient presents with Schizoaffective disorder with exacerbation of symptoms since recent medication change. No SI/HI. He is still endorsing +AH, not command in nature. +Vague paranoia and delusions. These symptoms represent a change from baseline from which the patient cannot be reasonably expected to improve with current level of care. The patient is requesting voluntary inpatient psychiatric hospitalization for safety and stabilization of psychiatric symptoms. Admit to [address] [place] [address] 48 year old single, domiciled [place], unemployed, on disability. PPHx of schizophrenia, anxiety multiple prior admissions last in 2012 at [facility]. Was previously on Clozaril 50 mg, doing well aside from feeling tired, changed psychiatrist in July and is now on Depakote and Seroquel, reports symptoms have worsened on this regimen. PMHx of HTN, HDL and restless leg who self presents to hospital for psychiatric evaluation.    Patient presents with Schizoaffective disorder with exacerbation of symptoms since recent medication change. No SI/HI. He is still endorsing +AH, not command in nature. +Vague paranoia and delusions. These symptoms represent a change from baseline from which the patient cannot be reasonably expected to improve with current level of care. The patient is requesting voluntary inpatient psychiatric hospitalization for safety and stabilization of psychiatric symptoms. Admit to [address] [place] [address]

## 2023-12-08 NOTE — BH PATIENT PROFILE - FALL HARM RISK - UNIVERSAL INTERVENTIONS
Non-slip footwear when patient is out of bed/[place] to call system/Physically safe environment - no spills, clutter or unnecessary equipment/Purposeful Proactive Rounding Non-slip footwear when patient is out of bed/[place] to call system/Physically safe environment - no spills, clutter or unnecessary equipment/Purposeful Proactive Rounding

## 2023-12-08 NOTE — BH SAFETY PLAN - INTERNAL COPING STRATEGY 3
ASSESSMENT:  7y5m Male with acute appendicitis, 3 days of symptoms that include RLQ abdominal pain and fever to 102 at home although afebrile here. Exam is localized tenderness in RLQ. Labs significant for WBC 13.8 and imaging shows non-compressible, dilated appendix 1.3cm diameter    PLAN:   NPO  IV Fluid  Pre-op labs  Pain control  IV abx, cefotetan  Peds admission  Added on for laparoscopic appendectomy possible open for AM  Consented  Surgery to follow for planned OR  F/U official US read in AM    Above plan discussed with Dr. Mendez, patient/patient's mother, and Peds ED team  --------------------------------------------------------------------------------------  04-20-20 @ 23:50
I like to play cards.

## 2023-12-08 NOTE — ED BEHAVIORAL HEALTH PROGRESS NOTE - SUMMARY
48 year old single, domiciled [place], unemployed, on disability. PPHx of schizophrenia, anxiety multiple prior admissions last in 2012 at [facility]. Was previously on Clozaril 50 mg, doing well aside from feeling tired, changed psychiatrist in July and is now on Depakote and Seroquel, reports symptoms have worsened on this regimen. PMHx of HTN, HDL and restless leg who self presents to hospital for psychiatric evaluation.    Patient presents with Schizoaffective disorder with exacerbation of symptoms since recent medication change. No SI/HI. +AH, not command in nature. +Vague paranoia and delusions. These symptoms represent a change from baseline from which the patient cannot be reasonably expected to improve with current level of care. The patient is requesting voluntary inpatient psychiatric hospitalization for safety and stabilization of psychiatric symptoms. 48 year old single, domiciled [place], unemployed, on disability. PPHx of schizophrenia, anxiety multiple prior admissions last in 2012 at [facility]. Was previously on Clozaril 50 mg, doing well aside from feeling tired, changed psychiatrist in July and is now on Depakote and Seroquel, reports symptoms have worsened on this regimen. PMHx of HTN, HDL and restless leg who self presents to hospital for psychiatric evaluation.    Patient presents with Schizoaffective disorder with exacerbation of symptoms since recent medication change. No SI/HI. +AH, not command in nature. +Vague paranoia and delusions. These symptoms represent a change from baseline from which the patient cannot be reasonably expected to improve with current level of care. The patient is requesting voluntary inpatient psychiatric hospitalization for safety and stabilization of psychiatric symptoms.

## 2023-12-08 NOTE — ED BEHAVIORAL HEALTH PROGRESS NOTE - NS ED BHA PLAN ADMIT TO PSYCHIATRY REFERRANT CONTACTED YN
Subjective:          Chief Complaint: Jorge Mckeon is a 68 y.o. male who had concerns including Disease Management.    HPI:    Pt is a 68-year-old gentleman with seropositive rheumatoid arthritis   Has a history of renal cell carcinoma s/p partial nephrectomy    Pain 3/10 SOTERO: 0.7.    Low back stiffness chronic intermittent w/o radiculopathy.   Intermittent swelling MCP, + AM stiffness about 30 minutes.     +Rheum nodules.   He is currently on  methotrexate 9 tablets weekly   Folic acid.   HCQ 200mg  -Dr. Levi -2019.   Prednisone 5mg daily did not take b/c concern over BS. Raises blood sugar.   Previously: Enbrel, Orencia, Remicade, Actemra, Simponi, Cimzia and best overall was Humira with longest success. never RTX.   Never LFA  No other malignancies.   Patient follows with Dr. Chandra for pain management. previous RFA lumbar spine see MRI in [facility]. S/p procedures. Currently with HEP and this is helping.        REVIEW OF SYSTEMS:    Review of Systems   Constitutional:  Negative for fever, malaise/fatigue and weight loss.   HENT:  Negative for sore throat.    Eyes:  Negative for double vision, photophobia and redness.   Respiratory:  Negative for cough, shortness of breath and wheezing.    Cardiovascular:  Negative for chest pain, palpitations and orthopnea.   Gastrointestinal:  Negative for abdominal pain, constipation and diarrhea.   Genitourinary:  Negative for dysuria, hematuria and urgency.   Musculoskeletal:  Positive for joint pain. Negative for back pain and myalgias.   Skin:  Negative for rash.   Neurological:  Negative for dizziness, tingling, focal weakness and headaches.   Endo/Heme/Allergies:  Does not bruise/bleed easily.   Psychiatric/Behavioral:  Negative for depression, hallucinations and suicidal ideas.              Objective:            Past Medical History:   Diagnosis Date    Anticoagulant long-term use     Cancer     renal CA    CHF (congestive heart failure)     Coronary artery disease      Current smoker 2015    Diabetes     IDDM    Encounter for blood transfusion     GERD (gastroesophageal reflux disease)     H/O CHF     Hyperlipidemia     stopped cholesterol meds currently    Hypertension     Immunosuppression     Methotrexate for 2 years for RA    Rheumatoid arthritis      Family History   Problem Relation Age of Onset    Cancer Mother     Diabetes Mother     Heart disease Father      Social History     Tobacco Use    Smoking status: Former     Packs/day: 1.50     Years: 50.00     Pack years: 75.00     Types: Cigarettes     Start date:      Quit date: 2022     Years since quittin.2    Smokeless tobacco: Never    Tobacco comments:     5 A DAY   Substance Use Topics    Alcohol use: No    Drug use: No         Current Outpatient Medications on File Prior to Visit   Medication Sig Dispense Refill    aspirin (ECOTRIN) 81 MG EC tablet Take 81 mg by mouth once daily.      carvediloL (COREG) 6.25 MG tablet TAKE ONE TABLET BY MOUTH TWICE DAILY 60 tablet 6    fenofibrate (TRICOR) 54 MG tablet TAKE ONE TABLET BY MOUTH DAILY 90 tablet 1    fluticasone-salmeterol diskus inhaler 100-50 mcg INHALE ONE PUFF BY MOUTH TWICE DAILY AS DIRECTED. *THANK YOU* *SUN DE LA VEGA* 180 each 2    folic acid (FOLVITE) 1 MG tablet TAKE 1 TABLET BY MOUTH DAILY 90 tablet 3    furosemide (LASIX) 20 MG tablet TAKE ONE TABLET BY MOUTH EVERY DAY 90 tablet 2    gabapentin (NEURONTIN) 600 MG tablet Take 600 mg by mouth 3 (three) times daily.      insulin aspart protamine-insulin aspart (NOVOLOG MIX 70-30FLEXPEN U-100) 100 unit/mL (70-30) InPn pen INJECT 68 UNITS UNDER THE SKIN TWICE DAILY 120 mL 1    metFORMIN (GLUCOPHAGE-XR) 750 MG ER 24hr tablet TAKE TWO TABLETS (1500 MG TOTAL) BY MOUTH DAILY with breakfast 180 tablet 1    methotrexate 2.5 MG Tab TAKE 9 TABLETS BY MOUTH EVERY 7 DAYS 108 tablet 3    nitroGLYCERIN (NITROSTAT) 0.3 MG SL tablet Place 1 tablet (0.3 mg total) under the tongue every 5 (five) minutes as needed for  Chest pain. 25 tablet 0    oxyCODONE-acetaminophen (PERCOCET)  mg per tablet Take 1 tablet by mouth 2 (two) times daily as needed for Pain.      pantoprazole (PROTONIX) 40 MG tablet TAKE ONE TABLET BY MOUTH EVERY DAY 90 tablet 1    pravastatin (PRAVACHOL) 80 MG tablet Take 1 tablet (80 mg total) by mouth once daily. 90 tablet 1    promethazine (PHENERGAN) 12.5 MG Tab Take 1 tablet (12.5 mg total) by mouth every 6 (six) hours as needed. 10 tablet 0    sacubitriL-valsartan (ENTRESTO) 24-26 mg per tablet Take 1 tablet by mouth 2 (two) times daily. 180 tablet 3    sildenafiL (VIAGRA) 100 MG tablet TAKE ONE TABLET BY MOUTH DAILY AS NEEDED 6 tablet 0    spironolactone (ALDACTONE) 25 MG tablet TAKE ONE TABLET (25 MG TOTAL) BY MOUTH ONCE DAILY 90 tablet 2    albuterol (PROAIR HFA) 90 mcg/actuation inhaler Inhale 2 puffs into the lungs every 6 (six) hours as needed for Wheezing. Rescue 18 g 0    [DISCONTINUED] DULoxetine (CYMBALTA) 60 MG capsule Take 1 capsule (60 mg total) by mouth once daily. 90 capsule 2     No current facility-administered medications on file prior to visit.       Vitals:    05/08/23 1134   BP: (!) 146/78   Pulse: 76       Physical Exam:    Physical Exam  Constitutional:       Appearance: He is well-developed.   HENT:      Head: Normocephalic.      Right Ear: Hearing normal.      Left Ear: Hearing normal.      Nose: No rhinorrhea.      Mouth/Throat:      Pharynx: Uvula midline.   Eyes:      Conjunctiva/sclera: Conjunctivae normal.      Pupils: Pupils are equal, round, and reactive to light.   Cardiovascular:      Rate and Rhythm: Normal rate and regular rhythm.      Heart sounds: Normal heart sounds.   Pulmonary:      Effort: Pulmonary effort is normal.      Breath sounds: Normal breath sounds.   Musculoskeletal:      Right shoulder: No swelling or tenderness. Normal range of motion.      Left shoulder: No swelling or tenderness. Normal range of motion.      Right wrist: No swelling or tenderness.  Normal range of motion.      Left wrist: No swelling or tenderness. Normal range of motion.      Right hand: Swelling and tenderness present. Normal range of motion.      Left hand: Tenderness present. No swelling. Decreased range of motion.      Right knee: No swelling. Normal range of motion. No tenderness.      Left knee: No swelling. Normal range of motion. No tenderness.      Right ankle: No swelling. No tenderness. Normal range of motion.      Left ankle: No swelling. No tenderness. Normal range of motion.      Right foot: Normal range of motion. No swelling or tenderness.      Left foot: Normal range of motion. No swelling or tenderness.      Comments: ruight rheum nodule on elbow. No flexion contracture. Right 2,3 MCP with swelling.    Skin:     General: Skin is warm and dry.   Neurological:      Mental Status: He is oriented to person, place, and time.   Psychiatric:         Speech: Speech normal.         Behavior: Behavior normal.             Assessment:       Encounter Diagnoses   Name Primary?    Rheumatoid arthritis involving multiple sites with positive rheumatoid factor Yes    Biventricular ICD (implantable cardioverter-defibrillator) in place           Plan:        Rheumatoid arthritis involving multiple sites with positive rheumatoid factor  -     CBC Auto Differential; Standing; Expected date: 05/08/2023  -     Comprehensive Metabolic Panel; Standing; Expected date: 05/08/2023  -     Sedimentation rate; Standing; Expected date: 05/08/2023  -     C-Reactive Protein; Standing; Expected date: 05/08/2023    Biventricular ICD (implantable cardioverter-defibrillator) in place    Immunosuppression  -     CBC Auto Differential; Standing; Expected date: 05/08/2023  -     Comprehensive Metabolic Panel; Standing; Expected date: 05/08/2023  -     Sedimentation rate; Standing; Expected date: 05/08/2023  -     C-Reactive Protein; Standing; Expected date: 05/08/2023    High risk medications (not anticoagulants)  long-term use  -     CBC Auto Differential; Standing; Expected date: 05/08/2023  -     Comprehensive Metabolic Panel; Standing; Expected date: 05/08/2023  -     Sedimentation rate; Standing; Expected date: 05/08/2023  -     C-Reactive Protein; Standing; Expected date: 05/08/2023      Continue MTX at 9 tabs weekly   folic acid 1mg daily.   HCQ 200mg once daily  Will need eye exam recent 2021 with Dr. Mcguire.   Labs printed to be done now and in 3 months. At [place].       No follow-ups on file.        30min consultation with greater than 50% spent in counseling, chart review and coordination of care. All questions answered.    Thank you for allowing me to participate in the care of this very pleasant patient.                             Yes

## 2023-12-08 NOTE — BH SAFETY PLAN - SUICIDE PREVENTION LIFELINE PHONES
Suicide Prevention Lifeline Phone: [phone]- TALK ([phone]) Suicide Prevention Lifeline Phone: [phone]- TALK ([phone])

## 2023-12-08 NOTE — BH SAFETY PLAN - LOCAL URGENT CARE ADDRESS
Go to the closest emergency room, urgent care facility or call [phone] for immediate assistance.  Go to the closest emergency room, urgent care facility or call [phone] for immediate assistance.

## 2023-12-08 NOTE — ED BEHAVIORAL HEALTH NOTE - BEHAVIORAL HEALTH NOTE
NOTE:     ================     Re-assessment Note     ================     SOURCE:  RN and secondhand ED documentation.     BEHAVIOR: Per RN pt has remained calm, cooperative and in behavioral control. RN reports pt is currently sleeping. RN reports pt received Melatonin 3mg and Seroquel 100mg @ 24:00. RN reports pt is also given Nicotine gum. RN denies visitors overnight. RN reports pt continues to be in a gown and with a 1:1.

## 2023-12-08 NOTE — PSYCHIATRIC REHAB INITIAL EVALUATION - NSBHALCSUBTREAT_PSY_ALL_CORE
[facility] Dr. Vitaliy Heller-Psychiatrist./Outpatient clinic (specify) [facility] Dr. Vitaliy Heller-Psychiatrist./Outpatient clinic (specify)

## 2023-12-08 NOTE — BH PATIENT PROFILE - NSPROREFERSVCHOMEDIABETES_GEN_A_NUR
Source: Patient [ ]  Family [ ]   other [x ]    Current Diet: Diet, DASH/TLC:   Sodium & Cholesterol Restricted  Supplement Feeding Modality:  Oral  Ensure Enlive Cans or Servings Per Day:  1       Frequency:  Three Times a day  Health Shake Cans or Servings Per Day:  1       Frequency:  Two Times a day  Ensure Pudding Cans or Servings Per Day:  1       Frequency:  Two Times a day (10-31-20 @ 03:14)    PO intake: tolerating per documentation    Current Weight:   (10/28) 196.2 lbs  (10/19) 200.4 lbs  (10/18) 207.4 lbs  (10/17) 201.7 lbs  (10/16) 203.4 lbs  Probable 7.2 lb (3.5%) wt loss since admission; noted with 2+ left leg/foot edema    Pertinent Medications: MEDICATIONS  (STANDING):  ALBUTerol    90 MICROgram(s) HFA Inhaler 1 Puff(s) Inhalation every 4 hours  albuterol/ipratropium for Nebulization 3 milliLiter(s) Nebulizer every 6 hours  allopurinol 100 milliGRAM(s) Oral daily  ascorbic acid 500 milliGRAM(s) Oral two times a day  atorvastatin 20 milliGRAM(s) Oral at bedtime  budesonide 160 MICROgram(s)/formoterol 4.5 MICROgram(s) Inhaler 2 Puff(s) Inhalation two times a day  carvedilol 12.5 milliGRAM(s) Oral every 12 hours  chlorhexidine 2% Cloths 1 Application(s) Topical <User Schedule>  epoetin felisha-epbx (RETACRIT) Injectable 90160 Unit(s) SubCutaneous <User Schedule>  folic acid 1 milliGRAM(s) Oral daily  guaiFENesin  milliGRAM(s) Oral every 12 hours  heparin  Infusion 1600 Unit(s)/Hr (16 mL/Hr) IV Continuous <Continuous>  hydrALAZINE 50 milliGRAM(s) Oral three times a day  melatonin 3 milliGRAM(s) Oral at bedtime  multivitamin 1 Tablet(s) Oral daily  pantoprazole    Tablet 40 milliGRAM(s) Oral before breakfast  senna 2 Tablet(s) Oral at bedtime  tamsulosin 0.4 milliGRAM(s) Oral at bedtime  thiamine 100 milliGRAM(s) Oral daily  zinc sulfate 220 milliGRAM(s) Oral daily    MEDICATIONS  (PRN):  acetaminophen   Tablet .. 975 milliGRAM(s) Oral every 6 hours PRN Mild Pain (1 - 3)  benzocaine 15 mG/menthol 3.6 mG (Sugar-Free) Lozenge 1 Lozenge Oral every 3 hours PRN Sore Throat  HYDROmorphone  Injectable 0.5 milliGRAM(s) IV Push every 4 hours PRN Severe Pain (7 - 10)  oxyCODONE    IR 5 milliGRAM(s) Oral every 6 hours PRN Moderate Pain (4 - 6)  temazepam 30 milliGRAM(s) Oral at bedtime PRN Insomnia    Pertinent Labs: CBC Full  -  ( 01 Nov 2020 07:42 )  WBC Count : 9.05 K/uL  RBC Count : 1.91 M/uL  Hemoglobin : 7.1 g/dL  Hematocrit : 22.3 %  Platelet Count - Automated : 183 K/uL  Mean Cell Volume : 116.8 fl  Mean Cell Hemoglobin : 37.2 pg  Mean Cell Hemoglobin Concentration : 31.8 gm/dL  Auto Neutrophil # : 7.21 K/uL  Auto Lymphocyte # : 0.72 K/uL  Auto Monocyte # : 0.96 K/uL  Auto Eosinophil # : 0.06 K/uL  Auto Basophil # : 0.03 K/uL  Auto Neutrophil % : 79.6 %  Auto Lymphocyte % : 8.0 %  Auto Monocyte % : 10.6 %  Auto Eosinophil % : 0.7 %  Auto Basophil % : 0.3 %    -No recent labs    Skin: surgical incision    Nutrition focused physical exam conducted - found signs of malnutrition [ ]absent [ x]present    Subcutaneous fat loss: MILD [x ] Orbital fat pads region, [ ]Buccal fat region, [ ]Triceps region,  [ ]Ribs region    Muscle wasting: MILD [x ]Temples region, [x ]Clavicle region, [ ]Shoulder region, [ ]Scapula region, [ ]Interosseous region,  [ ]thigh region, [ ]Calf region    Estimated Needs:   [x ] no change since previous assessment  [ ] recalculated:     Current Nutrition Diagnosis: Pt remains at high nutrition risk secondary to malnutrition (moderate, acute) related to inability to meet increased nutrient needs in setting of COPD, CHF, LLE hematoma evacuation and debridement, s/p wound vac application to LLE wound, and plan for OR for likely skin graft as evidenced by meeting <75% nutrient needs >7 days, mild muscle loss of temples and clavicles, mild fat loss of orbitals, 3.5% wt loss x ~2 weeks, and +edema. Pt sleeping soundly during assessment. Pt continues to tolerate diet per documentation. Last documented BM 10/29.     Recommendations:   1) Continue diet as tolerated.   2) Continue Ensure Enlive TID to optimize po intake and provide an additional 350 kcal, 20g protein per serving.   3) Continue MVI, folic acid, vitamin C, and zinc sulfate supplementation.  4) Encourage po intake, monitor diet tolerance, and provide assistance at meals as needed.   5) Encourage HBV protein sources.  6) Obtain daily weights to monitor trends.     Monitoring and Evaluation:   [x ] PO intake [x ] Tolerance to diet prescription [X] Weights  [X] Follow up per protocol [X] Labs. yes

## 2023-12-08 NOTE — BH PATIENT PROFILE - NSBHAFFECT_PSY_A_CORE
HOSPITALIST DISCHARGE INSTRUCTIONS    NAME: Yue Bellamy   :  1982   MRN:  [id number]     Date/Time:  2017 12:10 PM    ADMIT DATE: 2017     DISCHARGE DATE: 2017     DISCHARGE DIAGNOSIS:  Persistent SubAcute Diarrhea POA (3 weeks PTA)  ESRD  HTN  DM  Legally Blind    MEDICATIONS:  · It is important that you take the medication exactly as they are prescribed. · Keep your medication in the bottles provided by the pharmacist and keep a list of the medication names, dosages, and times to be taken in your wallet. · Do not take other medications without consulting your doctor. Pain Management: per above medications    What to do at Home    Recommended diet:  Resume previous diet    Recommended activity: Activity as tolerated    If you have questions regarding the hospital related prescriptions or hospital related issues [facility] [phone]. If you experience any of the following symptoms then please call your primary care physician or return to the emergency room if you cannot get hold of your doctor:  Fever, chills, nausea, vomiting, diarrhea, change in mentation, falling, bleeding, shortness of breath        Information obtained by :  I understand that if any problems occur once I am at home I am to contact my physician. I understand and acknowledge receipt of the instructions indicated above.                                                                                                                                            Physician's or R.N.'s Signature                                                                  Date/Time                                                                                                                                              Patient or Representative Signature                                                          Date/Time
flat

## 2023-12-08 NOTE — BH PATIENT PROFILE - HOME MEDICATIONS
doxycycline hyclate 100 mg oral capsule , 1 cap(s) orally 2 times a day  amoxicillin-clavulanate 875 mg-125 mg oral tablet , 875 milligram(s) orally 2 times a day  metFORMIN 500 mg oral tablet, extended release , 1 tab(s) orally 2 times a day  folic acid 0.4 mg oral tablet , 1 tab(s) orally once a day  gabapentin 300 mg oral capsule , 1 cap(s) orally 3 times a day  TriCor 48 mg oral tablet , 1 tab(s) orally once a day  Remeron 15 mg oral tablet , 1 tab(s) orally once a day (at bedtime)  Depakote  mg oral tablet, extended release , 1 tab every AM and 2 tabs every PM  Latuda 40 mg oral tablet , 1 tab(s) orally once a day  Requip ,  orally   Remeron ,  orally   Depakote ,  orally

## 2023-12-09 LAB
A1C WITH ESTIMATED AVERAGE GLUCOSE RESULT: 6.8 % — HIGH (ref 4–5.6)
A1C WITH ESTIMATED AVERAGE GLUCOSE RESULT: 6.8 % — HIGH (ref 4–5.6)
CHOLEST SERPL-MCNC: 131 MG/DL — SIGNIFICANT CHANGE UP
CHOLEST SERPL-MCNC: 131 MG/DL — SIGNIFICANT CHANGE UP
ESTIMATED AVERAGE GLUCOSE: 148 MG/DL — HIGH (ref 68–114)
ESTIMATED AVERAGE GLUCOSE: 148 MG/DL — HIGH (ref 68–114)
HDLC SERPL-MCNC: 29 MG/DL — LOW
HDLC SERPL-MCNC: 29 MG/DL — LOW
LIPID PNL WITH DIRECT LDL SERPL: 69 MG/DL — SIGNIFICANT CHANGE UP
LIPID PNL WITH DIRECT LDL SERPL: 69 MG/DL — SIGNIFICANT CHANGE UP
NON HDL CHOLESTEROL: 102 MG/DL — SIGNIFICANT CHANGE UP
NON HDL CHOLESTEROL: 102 MG/DL — SIGNIFICANT CHANGE UP
TRIGL SERPL-MCNC: 195 MG/DL — HIGH
TRIGL SERPL-MCNC: 195 MG/DL — HIGH
TSH SERPL-MCNC: 1.49 UU/ML — SIGNIFICANT CHANGE UP (ref 0.34–4.82)
TSH SERPL-MCNC: 1.49 UU/ML — SIGNIFICANT CHANGE UP (ref 0.34–4.82)

## 2023-12-09 PROCEDURE — 99232 SBSQ HOSP IP/OBS MODERATE 35: CPT

## 2023-12-09 RX ORDER — NICOTINE POLACRILEX 2 MG
2 GUM BUCCAL EVERY 4 HOURS
Refills: 0 | Status: DISCONTINUED | OUTPATIENT
Start: 2023-12-09 | End: 2024-01-04

## 2023-12-09 RX ORDER — ACETAMINOPHEN 500 MG
650 TABLET ORAL EVERY 6 HOURS
Refills: 0 | Status: DISCONTINUED | OUTPATIENT
Start: 2023-12-09 | End: 2024-01-04

## 2023-12-09 RX ADMIN — DIVALPROEX SODIUM 500 MILLIGRAM(S): 500 TABLET, DELAYED RELEASE ORAL at 09:48

## 2023-12-09 RX ADMIN — Medication 2 MILLIGRAM(S): at 18:31

## 2023-12-09 RX ADMIN — Medication 3 MILLIGRAM(S): at 20:48

## 2023-12-09 RX ADMIN — Medication 2 MILLIGRAM(S): at 13:48

## 2023-12-09 RX ADMIN — Medication 2 MILLIGRAM(S): at 09:49

## 2023-12-09 RX ADMIN — QUETIAPINE FUMARATE 100 MILLIGRAM(S): 200 TABLET, FILM COATED ORAL at 20:48

## 2023-12-09 RX ADMIN — Medication 2 MILLIGRAM(S): at 20:48

## 2023-12-09 NOTE — BH INPATIENT PSYCHIATRY ASSESSMENT NOTE - NSBHMSEREMMEM_PSY_A_CORE
A Care Transition RN will be following this patient at discharge for 21 days due to a positive COVID-19 diagnosis.   Normal

## 2023-12-09 NOTE — BH INPATIENT PSYCHIATRY ASSESSMENT NOTE - NSBHCHARTREVIEWVS_PSY_A_CORE FT
Vital Signs Last 24 Hrs  T(C): 36.3 (12-09-23 @ 07:17), Max: 36.9 (12-08-23 @ 15:24)  T(F): 97.3 (12-09-23 @ 07:17), Max: 98.5 (12-08-23 @ 15:24)  HR: 104 (12-08-23 @ 15:24) (104 - 104)  BP: 147/89 (12-08-23 @ 15:24) (147/89 - 147/89)  BP(mean): --  RR: 18 (12-09-23 @ 07:17) (16 - 18)  SpO2: 100% (12-09-23 @ 07:17) (98% - 100%)    Orthostatic VS  12-09-23 @ 07:17  Lying BP: --/-- HR: --  Sitting BP: 137/84 HR: 78  Standing BP: 159/90 HR: 82  Site: upper right arm  Mode: --  Orthostatic VS  12-08-23 @ 16:20  Lying BP: --/-- HR: --  Sitting BP: 164/93 HR: 92  Standing BP: 117/68 HR: 104  Site: upper left arm  Mode: electronic

## 2023-12-09 NOTE — BH SOCIAL WORK INITIAL PSYCHOSOCIAL EVALUATION - OTHER PAST PSYCHIATRIC HISTORY (INCLUDE DETAILS REGARDING ONSET, COURSE OF ILLNESS, INPATIENT/OUTPATIENT TREATMENT)
48 year old single, domiciled [place] housing, unemployed, on disability. PPHx of schizoaffective disorder, anxiety multiple prior admissions last in 2012 at [facility]. Was previously on Clozaril 50 mg, changed psychiatrist in July and is now on Depakote and Seroquel, reports symptoms have worsened on this regimen. Pt reports also receives a Prolixin long acting injection 25 mg [id number]. No SI/HI.  Pt reports AH "I am having really bad thoughts, really bizarre and hearing voices telling me I killed people when I was younger and I'm a pedophile."  Relapsed on alcohol several weeks ago, last drink last November 14, 2023 2x 24 ounces beers. + No tobacco 1PPD, no drug use.    Pt reports is compliant with meds however having paranoia and auditory hallucinations for several weeks. 48 year old single, domiciled [place] housing, unemployed, on disability. PPHx of schizoaffective disorder, anxiety multiple prior admissions last in 2012 at [facility]. Was previously on Clozaril 50 mg, changed psychiatrist in July and is now on Depakote and Seroquel, reports symptoms have worsened on this regimen. Pt reports also receives a Prolixin long acting injection 25 mg [id number]. No SI/HI.  Pt reports AH "I am having really bad thoughts, really bizarre and hearing voices telling me I killed people when I was younger and I'm a pedophile."  Relapsed on alcohol several weeks ago, last drink last November 14, 2023 2x 24 ounces beers. + No tobacco 1PPD, no drug use.    Pt reports is compliant with meds however having paranoia and auditory hallucinations for several weeks.

## 2023-12-09 NOTE — BH INPATIENT PSYCHIATRY ASSESSMENT NOTE - OTHER PAST PSYCHIATRIC HISTORY (INCLUDE DETAILS REGARDING ONSET, COURSE OF ILLNESS, INPATIENT/OUTPATIENT TREATMENT)
History of schizophrenia multiple prior admissions  fu with [facility] league, meds through [facility] pharmacy History of schizophrenia multiple prior admissions  fu with [facility] league, meds through [facility] pharmacy

## 2023-12-09 NOTE — BH INPATIENT PSYCHIATRY ASSESSMENT NOTE - HPI (INCLUDE ILLNESS QUALITY, SEVERITY, DURATION, TIMING, CONTEXT, MODIFYING FACTORS, ASSOCIATED SIGNS AND SYMPTOMS)
48 year old single, domiciled [place], unemployed, on disability. PPHx of schizoaffective disorder, anxiety multiple prior admissions last in 2012 at [facility]. Was previously on Clozaril 50 mg, doing well aside from feeling tired, changed psychiatrist in July and is now on Depakote and Seroquel, reports symptoms have worsened on this regimen. PMHx of HTN, HDL and restless leg who self presents to hospital for psychiatric evaluation.    During encounter, pleasant calm cooperative. Does not appear particularly distressed dysphoric or overtly manic or psychotic. Linear throughout. Reports feeling okay though states that he's been having vague paranoia and auditory hallucinations ongoing for several weeks-months despite compliance with medications. [facility] with Dr. Burks (unable to reach, discreet message left). Confirmed partial med list through [facility] pharmacy (patient adds that he also receives a Prolixin long acting injection, last in October.)  Adds that he was previously on clozapine 50 mg nightly though was discontinued for unclear reasons (believes that his provider did not think it necessary considering low dose vs frequent cbc monitoring). Valproic acid decreased from 1500 to 500 mg by his request. Prolixin 25 mg [id number]. No SI/HI. AH is non-command in nature, however are saying disturbing things to him, stating, "Ever since I got off the Clozaril my head hasn't been right, I am having really bad thoughts, really bizarre and hearing voices telling me I killed people when I was younger and I'm a pedophile." He also says for the past few days his brain has not been working and he thinks this is because the government maybe gave him a lobotomy many years ago. Relapsed on alcohol several weeks ago, last drink last November 14, 2023 2x 24 ounces beers. + No tobacco 1PPD, no drug use. 48 year old single, domiciled [place], unemployed, on disability. PPHx of schizoaffective disorder, anxiety multiple prior admissions last in 2012 at [facility]. Was previously on Clozaril 50 mg, doing well aside from feeling tired, changed psychiatrist in July and is now on Depakote and Seroquel, reports symptoms have worsened on this regimen. PMHx of HTN, HDL and restless leg who self presents to hospital for psychiatric evaluation.    During encounter, pleasant calm cooperative. Does not appear particularly distressed dysphoric or overtly manic or psychotic. Linear throughout. Reports feeling okay though states that he's been having vague paranoia and auditory hallucinations ongoing for several weeks-months despite compliance with medications. Follows through [facility] with Dr. Burks (unable to reach, discreet message left). Confirmed partial med list through [facility] pharmacy (patient adds that he also receives a Prolixin long acting injection, last in October.)  Adds that he was previously on clozapine 50 mg nightly though was discontinued for unclear reasons (believes that his provider did not think it necessary considering low dose vs frequent cbc monitoring). Valproic acid decreased from 1500 to 500 mg by his request. Prolixin 25 mg [id number]. No SI/HI. AH is non-command in nature, however are saying disturbing things to him, stating, "Ever since I got off the Clozaril my head hasn't been right, I am having really bad thoughts, really bizarre and hearing voices telling me I killed people when I was younger and I'm a pedophile." He also says for the past few days his brain has not been working and he thinks this is because the government maybe gave him a lobotomy many years ago. Relapsed on alcohol several weeks ago, last drink last November 14, 2023 2x 24 ounces beers. + No tobacco 1PPD, no drug use.

## 2023-12-09 NOTE — BH INPATIENT PSYCHIATRY ASSESSMENT NOTE - NSBHMETABOLIC_PSY_ALL_CORE_FT
BMI: BMI (kg/m2): 36.2 (12-08-23 @ 16:20)  HbA1c:   Glucose:   BP: 147/89 (12-08-23 @ 15:24) (127/80 - 166/90)Vital Signs Last 24 Hrs  T(C): 36.3 (12-09-23 @ 07:17), Max: 36.9 (12-08-23 @ 15:24)  T(F): 97.3 (12-09-23 @ 07:17), Max: 98.5 (12-08-23 @ 15:24)  HR: 104 (12-08-23 @ 15:24) (104 - 104)  BP: 147/89 (12-08-23 @ 15:24) (147/89 - 147/89)  BP(mean): --  RR: 18 (12-09-23 @ 07:17) (16 - 18)  SpO2: 100% (12-09-23 @ 07:17) (98% - 100%)    Orthostatic VS  12-09-23 @ 07:17  Lying BP: --/-- HR: --  Sitting BP: 137/84 HR: 78  Standing BP: 159/90 HR: 82  Site: upper right arm  Mode: --  Orthostatic VS  12-08-23 @ 16:20  Lying BP: --/-- HR: --  Sitting BP: 164/93 HR: 92  Standing BP: 117/68 HR: 104  Site: upper left arm  Mode: electronic    Lipid Panel: Date/Time: 12-09-23 @ 07:10  Cholesterol, Serum: 131  LDL Cholesterol Calculated: 69  HDL Cholesterol, Serum: 29  Total Cholesterol/HDL Ration Measurement: --  Triglycerides, Serum: 195

## 2023-12-09 NOTE — BH SOCIAL WORK INITIAL PSYCHOSOCIAL EVALUATION - NSBHLIFEGOAL_PSY_ALL_CORE
Patient will be referred to the appropriate level of outpatient treatment and have appointments within 3-5 days of discharge.  Patient will be discharged to safe housing either back home or DSS emergency housing.  Benefits in place   will explore need for dual diagnosis tx with appointments if needed.

## 2023-12-09 NOTE — BH INPATIENT PSYCHIATRY ASSESSMENT NOTE - NSBHASSESSSUMMFT_PSY_ALL_CORE
48 year old single, domiciled [place], unemployed, on disability. PPHx of schizophrenia, anxiety multiple prior admissions last in 2012 at [facility]. Was previously on Clozaril 50 mg, doing well aside from feeling tired, changed psychiatrist in July and is now on Depakote and Seroquel, reports symptoms have worsened on this regimen. PMHx of HTN, HDL and restless leg who self presents to hospital for psychiatric evaluation.  Patient presents with Schizoaffective disorder with exacerbation of symptoms since recent medication change. No SI/HI. +AH, not command in nature. +Vague paranoia and delusions. These symptoms represent a change from baseline from which the patient cannot be reasonably expected to improve with current level of care. The patient is requesting voluntary inpatient psychiatric hospitalization for safety and stabilization of psychiatric symptoms.  h/o schizophrenia vs schizoaffective d/o  Plan admit to 5 N.  Continue divalproex  milliGRAM(s) Oral daily  influenza   Vaccine 0.5 milliLiter(s) IntraMuscular once  melatonin 3 milliGRAM(s) Oral at bedtime  QUEtiapine 100 milliGRAM(s) Oral at bedtime  Patient does not need one-to-one observation.       48 year old single, domiciled [place] housing, unemployed, on disability. PPHx of schizophrenia, anxiety multiple prior admissions last in 2012 at [facility]. Was previously on Clozaril 50 mg, doing well aside from feeling tired, changed psychiatrist in July and is now on Depakote and Seroquel, reports symptoms have worsened on this regimen. PMHx of HTN, HDL and restless leg who self presents to hospital for psychiatric evaluation.  Patient presents with Schizoaffective disorder with exacerbation of symptoms since recent medication change. No SI/HI. +AH, not command in nature. +Vague paranoia and delusions. These symptoms represent a change from baseline from which the patient cannot be reasonably expected to improve with current level of care. The patient is requesting voluntary inpatient psychiatric hospitalization for safety and stabilization of psychiatric symptoms.  h/o schizophrenia vs schizoaffective d/o  Plan admit to 5 N.  Continue divalproex  milliGRAM(s) Oral daily  influenza   Vaccine 0.5 milliLiter(s) IntraMuscular once  melatonin 3 milliGRAM(s) Oral at bedtime  QUEtiapine 100 milliGRAM(s) Oral at bedtime  Patient does not need one-to-one observation.

## 2023-12-09 NOTE — BH INPATIENT PSYCHIATRY ASSESSMENT NOTE - CURRENT MEDICATION
MEDICATIONS  (STANDING):  divalproex  milliGRAM(s) Oral daily  influenza   Vaccine 0.5 milliLiter(s) IntraMuscular once  melatonin 3 milliGRAM(s) Oral at bedtime  QUEtiapine 100 milliGRAM(s) Oral at bedtime    MEDICATIONS  (PRN):  hydrOXYzine hydrochloride 25 milliGRAM(s) Oral every 6 hours PRN Anxiety  nicotine  Polacrilex Gum 2 milliGRAM(s) Oral every 4 hours PRN nicotine withdrawal  nicotine  Polacrilex Gum 2 milliGRAM(s) Oral every 2 hours PRN NRT

## 2023-12-09 NOTE — BH INPATIENT PSYCHIATRY ASSESSMENT NOTE - RISK ASSESSMENT
Patient acute risk factors high considering presentation with acute psychosis.    Long-term risks are increased based on the fact that he is psychotic and noncompliant.  Protective factors:   help seeking  motivated and engaged in treatment  has providers  no si/hi    Mitigation of risks: Inpatient level of care, medication and safety plans.

## 2023-12-10 PROCEDURE — 99232 SBSQ HOSP IP/OBS MODERATE 35: CPT

## 2023-12-10 RX ORDER — NICOTINE POLACRILEX 2 MG
1 GUM BUCCAL DAILY
Refills: 0 | Status: DISCONTINUED | OUTPATIENT
Start: 2023-12-10 | End: 2024-01-04

## 2023-12-10 RX ADMIN — Medication 2 MILLIGRAM(S): at 20:55

## 2023-12-10 RX ADMIN — Medication 1 PATCH: at 13:00

## 2023-12-10 RX ADMIN — DIVALPROEX SODIUM 500 MILLIGRAM(S): 500 TABLET, DELAYED RELEASE ORAL at 09:35

## 2023-12-10 RX ADMIN — Medication 3 MILLIGRAM(S): at 21:29

## 2023-12-10 RX ADMIN — QUETIAPINE FUMARATE 100 MILLIGRAM(S): 200 TABLET, FILM COATED ORAL at 21:29

## 2023-12-10 RX ADMIN — Medication 2 MILLIGRAM(S): at 08:25

## 2023-12-10 RX ADMIN — Medication 2 MILLIGRAM(S): at 10:35

## 2023-12-10 NOTE — DIETITIAN INITIAL EVALUATION ADULT - ORAL INTAKE PTA/DIET HISTORY
Pt reports decreased appetite/po intake PTA x few months, no dietary restrictions, however, pt requests consistent CHO diet while inpatient

## 2023-12-10 NOTE — BH INPATIENT PSYCHIATRY PROGRESS NOTE - NSBHFUPINTERVALHXFT_PSY_A_CORE
Patient is casually dressed, this morning he is bed but later seen walking on the floor.  He is approachable and communicative.  Denies any thoughts of harming self or anybody else.  No anger or agitation.  His mood is stable.  Patient reports continuation of hearing voices when asked what is saying he responded with "not much:.   Denies thoughts about harming himself and others.  He says he was not because of my thoughts and would like to go back on it

## 2023-12-10 NOTE — BH INPATIENT PSYCHIATRY PROGRESS NOTE - NSBHCHARTREVIEWVS_PSY_A_CORE FT
Vital Signs Last 24 Hrs  T(C): 36.9 (12-10-23 @ 07:58), Max: 36.9 (12-10-23 @ 07:58)  T(F): 98.4 (12-10-23 @ 07:58), Max: 98.4 (12-10-23 @ 07:58)  HR: --  BP: --  BP(mean): --  RR: 16 (12-10-23 @ 07:58) (16 - 16)  SpO2: 98% (12-10-23 @ 07:58) (98% - 98%)    Orthostatic VS  12-10-23 @ 07:58  Lying BP: --/-- HR: --  Sitting BP: 153/90 HR: 84  Standing BP: 143/102 HR: 88  Site: upper right arm  Mode: electronic  Orthostatic VS  12-09-23 @ 07:17  Lying BP: --/-- HR: --  Sitting BP: 137/84 HR: 78  Standing BP: 159/90 HR: 82  Site: upper right arm  Mode: --  Orthostatic VS  12-08-23 @ 16:20  Lying BP: --/-- HR: --  Sitting BP: 164/93 HR: 92  Standing BP: 117/68 HR: 104  Site: upper left arm  Mode: electronic

## 2023-12-10 NOTE — DIETITIAN INITIAL EVALUATION ADULT - ORAL NUTRITION SUPPLEMENTS
Premier protein shake once/daily between meals to optimize nutritional needs (provides 160 kcal, 30 g protein/ shake)

## 2023-12-10 NOTE — DIETITIAN INITIAL EVALUATION ADULT - ADD RECOMMEND
1. Liberalize diet to Regular to optimize po/nutrient intake; Add Ensure Plus High Protein TID between meals to optimize PO intake (provides 350 kcal, 20g protein/ shake)   2. Consider adding thiamine 100 mg daily 2/2 poor PO intake/ malnutrition and MVI w/ minerals daily to ensure 100% RDA met   3. Add Vit C 500 mg BID, add Zinc Sulfate 220 mg x 10 days to promote wound healing.   4. Monitor bowel movements; consider adding Banatrol TID in applesauce to bowel regimen to help improve GI fxn.   5. Maintain aspiration precautions, back of bed >35 degrees.   6. Confirm GOC regarding nutrition support.  RDN will continue to monitor PO intake, labs, hydration, and wt prn.  1. At pt's request, rec change diet to Consist CHO  2. Add Premier protein shake once/daily between meals to optimize nutritional needs (provides 160 kcal, 30 g protein/ shake)   3.Monitor bowel movements, if no BM for >3 days, consider implementing bowel regimen.   RDN will continue to monitor PO intake, labs, hydration, and wt prn.

## 2023-12-10 NOTE — DIETITIAN INITIAL EVALUATION ADULT - NAME AND PHONE
Marj Godwin, MS, RDN, CDN, [facility] [phone]  Marj Godwin, MS, RDN, CDN, [facility] [phone]

## 2023-12-10 NOTE — BH INPATIENT PSYCHIATRY PROGRESS NOTE - CURRENT MEDICATION
MEDICATIONS  (STANDING):  divalproex  milliGRAM(s) Oral daily  influenza   Vaccine 0.5 milliLiter(s) IntraMuscular once  melatonin 3 milliGRAM(s) Oral at bedtime  nicotine - 21 mG/24Hr(s) Patch 1 Patch Transdermal daily  QUEtiapine 100 milliGRAM(s) Oral at bedtime    MEDICATIONS  (PRN):  acetaminophen     Tablet .. 650 milliGRAM(s) Oral every 6 hours PRN Moderate Pain (4 - 6)  hydrOXYzine hydrochloride 25 milliGRAM(s) Oral every 6 hours PRN Anxiety  nicotine  Polacrilex Gum 2 milliGRAM(s) Oral every 4 hours PRN nicotine withdrawal  nicotine  Polacrilex Gum 2 milliGRAM(s) Oral every 2 hours PRN NRT

## 2023-12-10 NOTE — BH INPATIENT PSYCHIATRY PROGRESS NOTE - PRN MEDS
MEDICATIONS  (PRN):  acetaminophen     Tablet .. 650 milliGRAM(s) Oral every 6 hours PRN Moderate Pain (4 - 6)  hydrOXYzine hydrochloride 25 milliGRAM(s) Oral every 6 hours PRN Anxiety  nicotine  Polacrilex Gum 2 milliGRAM(s) Oral every 4 hours PRN nicotine withdrawal  nicotine  Polacrilex Gum 2 milliGRAM(s) Oral every 2 hours PRN NRT

## 2023-12-10 NOTE — DIETITIAN INITIAL EVALUATION ADULT - OTHER INFO
48 year old single, domiciled [place], unemployed, on disability. PPHx of schizophrenia, anxiety multiple prior admissions last in 2012 at [facility]. Was previously on Clozaril 50 mg, doing well aside from feeling tired, changed psychiatrist in July and is now on Depakote and Seroquel, reports symptoms have worsened on this regimen. PMHx of HTN, HDL and restless leg who self presents to hospital for psychiatric evaluation.  Patient presents with Schizoaffective disorder with exacerbation of symptoms since recent medication change. No SI/HI. +AH, not command in nature. +Vague paranoia and delusions. These symptoms represent a change from baseline from which the patient cannot be reasonably expected to improve with current level of care. The patient is requesting voluntary inpatient psychiatric hospitalization for safety and stabilization of psychiatric symptoms.  h/o schizophrenia vs schizoaffective d/o    Pt w/good salo/po intake since admit, burger lunch. Pt did not want DM education, only requests the Consist CHO diet.  Rec change diet. See below recommendations.  48 year old single, domiciled [place], unemployed, on disability. PPHx of schizophrenia, anxiety multiple prior admissions last in 2012 at [facility]. Was previously on Clozaril 50 mg, doing well aside from feeling tired, changed psychiatrist in July and is now on Depakote and Seroquel, reports symptoms have worsened on this regimen. PMHx of HTN, HDL and restless leg who self presents to hospital for psychiatric evaluation.  Patient presents with Schizoaffective disorder with exacerbation of symptoms since recent medication change. No SI/HI. +AH, not command in nature. +Vague paranoia and delusions. These symptoms represent a change from baseline from which the patient cannot be reasonably expected to improve with current level of care. The patient is requesting voluntary inpatient psychiatric hospitalization for safety and stabilization of psychiatric symptoms.  h/o schizophrenia vs schizoaffective d/o    Pt w/good salo/po intake since admit, burger lunch. Pt did not want DM education, only requests the Consist CHO diet.  Rec change diet. See below recommendations.

## 2023-12-10 NOTE — BH INPATIENT PSYCHIATRY PROGRESS NOTE - NSBHATTESTBILLING_PSY_A_CORE
66086-[id number] OBS or IP - moderate complexity OR 35-49 mins 32980-Ibwfxvxrji OBS or IP - moderate complexity OR 35-49 mins

## 2023-12-10 NOTE — BH INPATIENT PSYCHIATRY PROGRESS NOTE - NSBHASSESSSUMMFT_PSY_ALL_CORE
48 year old single, domiciled [place], unemployed, on disability. PPHx of schizophrenia, anxiety multiple prior admissions last in 2012 at [facility]. Was previously on Clozaril 50 mg, doing well aside from feeling tired, changed psychiatrist in July and is now on Depakote and Seroquel, reports symptoms have worsened on this regimen. PMHx of HTN, HDL and restless leg who self presents to hospital for psychiatric evaluation.  Patient presents with Schizoaffective disorder with exacerbation of symptoms since recent medication change. No SI/HI. +AH, not command in nature. +Vague paranoia and delusions. These symptoms represent a change from baseline from which the patient cannot be reasonably expected to improve with current level of care. The patient is requesting voluntary inpatient psychiatric hospitalization for safety and stabilization of psychiatric symptoms.  h/o schizophrenia vs schizoaffective d/o    Continue divalproex  milliGRAM(s) Oral daily  melatonin 3 milliGRAM(s) Oral at bedtime  QUEtiapine 100 milliGRAM(s) Oral at bedtime  Patient does not need one-to-one observation.  Pt is asking to start Clozaril Will defer to primary provider.          48 year old single, domiciled [place], unemployed, on disability. PPHx of schizophrenia, anxiety multiple prior admissions last in 2012 at [facility]. Was previously on Clozaril 50 mg, doing well aside from feeling tired, changed psychiatrist in July and is now on Depakote and Seroquel, reports symptoms have worsened on this regimen. PMHx of HTN, HDL and restless leg who self presents to hospital for psychiatric evaluation.  Patient presents with Schizoaffective disorder with exacerbation of symptoms since recent medication change. No SI/HI. +AH, not command in nature. +Vague paranoia and delusions. These symptoms represent a change from baseline from which the patient cannot be reasonably expected to improve with current level of care. The patient is requesting voluntary inpatient psychiatric hospitalization for safety and stabilization of psychiatric symptoms.  h/o schizophrenia vs schizoaffective d/o    Continue divalproex  milliGRAM(s) Oral daily  melatonin 3 milliGRAM(s) Oral at bedtime  QUEtiapine 100 milliGRAM(s) Oral at bedtime  Patient does not need one-to-one observation.  Pt is asking to start Clozaril Will defer to primary provider.

## 2023-12-10 NOTE — BH INPATIENT PSYCHIATRY PROGRESS NOTE - NSBHMETABOLIC_PSY_ALL_CORE_FT
BMI: BMI (kg/m2): 36.2 (12-08-23 @ 16:20)  HbA1c: A1C with Estimated Average Glucose Result: 6.8 % (12-09-23 @ 07:10)    Glucose:   BP: 147/89 (12-08-23 @ 15:24) (127/80 - 147/89)Vital Signs Last 24 Hrs  T(C): 36.9 (12-10-23 @ 07:58), Max: 36.9 (12-10-23 @ 07:58)  T(F): 98.4 (12-10-23 @ 07:58), Max: 98.4 (12-10-23 @ 07:58)  HR: --  BP: --  BP(mean): --  RR: 16 (12-10-23 @ 07:58) (16 - 16)  SpO2: 98% (12-10-23 @ 07:58) (98% - 98%)    Orthostatic VS  12-10-23 @ 07:58  Lying BP: --/-- HR: --  Sitting BP: 153/90 HR: 84  Standing BP: 143/102 HR: 88  Site: upper right arm  Mode: electronic  Orthostatic VS  12-09-23 @ 07:17  Lying BP: --/-- HR: --  Sitting BP: 137/84 HR: 78  Standing BP: 159/90 HR: 82  Site: upper right arm  Mode: --  Orthostatic VS  12-08-23 @ 16:20  Lying BP: --/-- HR: --  Sitting BP: 164/93 HR: 92  Standing BP: 117/68 HR: 104  Site: upper left arm  Mode: electronic    Lipid Panel: Date/Time: 12-09-23 @ 07:10  Cholesterol, Serum: 131  LDL Cholesterol Calculated: 69  HDL Cholesterol, Serum: 29  Total Cholesterol/HDL Ration Measurement: --  Triglycerides, Serum: 195

## 2023-12-11 PROCEDURE — 99222 1ST HOSP IP/OBS MODERATE 55: CPT

## 2023-12-11 PROCEDURE — 99232 SBSQ HOSP IP/OBS MODERATE 35: CPT

## 2023-12-11 RX ORDER — CLOZAPINE 150 MG/1
25 TABLET, ORALLY DISINTEGRATING ORAL AT BEDTIME
Refills: 0 | Status: DISCONTINUED | OUTPATIENT
Start: 2023-12-11 | End: 2023-12-12

## 2023-12-11 RX ADMIN — CLOZAPINE 25 MILLIGRAM(S): 150 TABLET, ORALLY DISINTEGRATING ORAL at 20:39

## 2023-12-11 RX ADMIN — Medication 1 PATCH: at 09:41

## 2023-12-11 RX ADMIN — Medication 25 MILLIGRAM(S): at 20:39

## 2023-12-11 RX ADMIN — DIVALPROEX SODIUM 500 MILLIGRAM(S): 500 TABLET, DELAYED RELEASE ORAL at 09:42

## 2023-12-11 RX ADMIN — Medication 3 MILLIGRAM(S): at 20:39

## 2023-12-11 NOTE — BH INPATIENT PSYCHIATRY PROGRESS NOTE - NSBHASSESSSUMMFT_PSY_ALL_CORE
48 year old single, domiciled [place], unemployed, on disability. PPHx of schizophrenia, anxiety multiple prior admissions last in 2012 at [facility]. Was previously on Clozaril 50 mg, doing well aside from feeling tired, changed psychiatrist in July and is now on Depakote and Seroquel, reports symptoms have worsened on this regimen. PMHx of HTN, HDL and restless leg who self presents to hospital for psychiatric evaluation.  Patient presents with Schizoaffective disorder with exacerbation of symptoms since recent medication change. No SI/HI. +AH, not command in nature. +Vague paranoia and delusions. These symptoms represent a change from baseline from which the patient cannot be reasonably expected to improve with current level of care. The patient is requesting voluntary inpatient psychiatric hospitalization for safety and stabilization of psychiatric symptoms.  h/o schizophrenia vs schizoaffective d/o    Continue divalproex  milliGRAM(s) Oral daily  melatonin 3 milliGRAM(s) Oral at bedtime  QUEtiapine 100 milliGRAM(s) Oral at bedtime  Patient does not need one-to-one observation.  Pt is asking to start Clozaril Will defer to primary provider.          48 year old single, domiciled [place], unemployed, on disability. PPHx of schizophrenia, anxiety multiple prior admissions last in 2012 at [facility]. Was previously on Clozaril 50 mg, doing well aside from feeling tired, changed psychiatrist in July and is now on Depakote and Seroquel, reports symptoms have worsened on this regimen. PMHx of HTN, HDL and restless leg who self presents to hospital for psychiatric evaluation.  Patient presents with Schizoaffective disorder with exacerbation of symptoms since recent medication change. No SI/HI. +AH, not command in nature. +Vague paranoia and delusions. These symptoms represent a change from baseline from which the patient cannot be reasonably expected to improve with current level of care. The patient is requesting voluntary inpatient psychiatric hospitalization for safety and stabilization of psychiatric symptoms.  h/o schizophrenia vs schizoaffective d/o    Continue divalproex  milliGRAM(s) Oral daily  melatonin 3 milliGRAM(s) Oral at bedtime  QUEtiapine 100 milliGRAM(s) Oral at bedtime  Patient does not need one-to-one observation.  Pt is asking to start Clozaril Will defer to primary provider.

## 2023-12-11 NOTE — BH INPATIENT PSYCHIATRY PROGRESS NOTE - NSBHATTESTBILLING_PSY_A_CORE
94257-[id number] OBS or IP - moderate complexity OR 35-49 mins [id number] OBS or IP - moderate complexity OR 35-49 mins

## 2023-12-11 NOTE — BH INPATIENT PSYCHIATRY PROGRESS NOTE - NSBHMETABOLIC_PSY_ALL_CORE_FT
BMI: BMI (kg/m2): 36.2 (12-08-23 @ 16:20)  HbA1c: A1C with Estimated Average Glucose Result: 6.8 % (12-09-23 @ 07:10)  TSH-1.49    Glucose:   BP: 147/89 (12-08-23 @ 15:24) (147/89 - 147/89)Vital Signs Last 24 Hrs  T(C): 36.3 (12-11-23 @ 07:24), Max: 36.3 (12-11-23 @ 07:24)  T(F): 97.3 (12-11-23 @ 07:24), Max: 97.3 (12-11-23 @ 07:24)  HR: --  BP: --  BP(mean): --  RR: 16 (12-11-23 @ 07:24) (16 - 16)  SpO2: 100% (12-11-23 @ 07:24) (100% - 100%)    Orthostatic VS  12-11-23 @ 07:24  Lying BP: --/-- HR: --  Sitting BP: 142/89 HR: 79  Standing BP: 147/96 HR: 85  Site: upper right arm  Mode: electronic  Orthostatic VS  12-10-23 @ 07:58  Lying BP: --/-- HR: --  Sitting BP: 153/90 HR: 84  Standing BP: 143/102 HR: 88  Site: upper right arm  Mode: electronic    Lipid Panel: Date/Time: 12-09-23 @ 07:10  Cholesterol, Serum: 131  LDL Cholesterol Calculated: 69  HDL Cholesterol, Serum: 29  Total Cholesterol/HDL Ration Measurement: --  Triglycerides, Serum: 195   BMI: BMI (kg/m2): 36.2 (12-08-23 @ 16:20)  HbA1c: A1C with Estimated Average Glucose Result: 6.8 % (12-09-23 @ 07:10)    Glucose:   BP: 147/89 (12-08-23 @ 15:24) (147/89 - 147/89)Vital Signs Last 24 Hrs  T(C): 36.3 (12-11-23 @ 07:24), Max: 36.3 (12-11-23 @ 07:24)  T(F): 97.3 (12-11-23 @ 07:24), Max: 97.3 (12-11-23 @ 07:24)  HR: --  BP: --  BP(mean): --  RR: 16 (12-11-23 @ 07:24) (16 - 16)  SpO2: 100% (12-11-23 @ 07:24) (100% - 100%)    Orthostatic VS  12-11-23 @ 07:24  Lying BP: --/-- HR: --  Sitting BP: 142/89 HR: 79  Standing BP: 147/96 HR: 85  Site: upper right arm  Mode: electronic  Orthostatic VS  12-10-23 @ 07:58  Lying BP: --/-- HR: --  Sitting BP: 153/90 HR: 84  Standing BP: 143/102 HR: 88  Site: upper right arm  Mode: electronic    Lipid Panel: Date/Time: 12-09-23 @ 07:10  Cholesterol, Serum: 131  LDL Cholesterol Calculated: 69  HDL Cholesterol, Serum: 29  Total Cholesterol/HDL Ration Measurement: --  Triglycerides, Serum: 195

## 2023-12-11 NOTE — BH INPATIENT PSYCHIATRY PROGRESS NOTE - CURRENT MEDICATION
MEDICATIONS  (STANDING):  divalproex  milliGRAM(s) Oral daily  influenza   Vaccine 0.5 milliLiter(s) IntraMuscular once  melatonin 3 milliGRAM(s) Oral at bedtime  nicotine - 21 mG/24Hr(s) Patch 1 Patch Transdermal daily  QUEtiapine 100 milliGRAM(s) Oral at bedtime    MEDICATIONS  (PRN):  acetaminophen     Tablet .. 650 milliGRAM(s) Oral every 6 hours PRN Moderate Pain (4 - 6)  hydrOXYzine hydrochloride 25 milliGRAM(s) Oral every 6 hours PRN Anxiety  nicotine  Polacrilex Gum 2 milliGRAM(s) Oral every 2 hours PRN NRT  nicotine  Polacrilex Gum 2 milliGRAM(s) Oral every 4 hours PRN nicotine withdrawal   MEDICATIONS  (STANDING):  cloZAPine 25 milliGRAM(s) Oral at bedtime  divalproex  milliGRAM(s) Oral daily  influenza   Vaccine 0.5 milliLiter(s) IntraMuscular once  melatonin 3 milliGRAM(s) Oral at bedtime  nicotine - 21 mG/24Hr(s) Patch 1 Patch Transdermal daily    MEDICATIONS  (PRN):  acetaminophen     Tablet .. 650 milliGRAM(s) Oral every 6 hours PRN Moderate Pain (4 - 6)  hydrOXYzine hydrochloride 25 milliGRAM(s) Oral every 6 hours PRN Anxiety  nicotine  Polacrilex Gum 2 milliGRAM(s) Oral every 4 hours PRN nicotine withdrawal  nicotine  Polacrilex Gum 2 milliGRAM(s) Oral every 2 hours PRN NRT

## 2023-12-11 NOTE — BH INPATIENT PSYCHIATRY PROGRESS NOTE - NSBHCHARTREVIEWVS_PSY_A_CORE FT
Vital Signs Last 24 Hrs  T(C): 36.3 (12-11-23 @ 07:24), Max: 36.3 (12-11-23 @ 07:24)  T(F): 97.3 (12-11-23 @ 07:24), Max: 97.3 (12-11-23 @ 07:24)  HR: --  BP: --  BP(mean): --  RR: 16 (12-11-23 @ 07:24) (16 - 16)  SpO2: 100% (12-11-23 @ 07:24) (100% - 100%)    Orthostatic VS  12-11-23 @ 07:24  Lying BP: --/-- HR: --  Sitting BP: 142/89 HR: 79  Standing BP: 147/96 HR: 85  Site: upper right arm  Mode: electronic  Orthostatic VS  12-10-23 @ 07:58  Lying BP: --/-- HR: --  Sitting BP: 153/90 HR: 84  Standing BP: 143/102 HR: 88  Site: upper right arm  Mode: electronic

## 2023-12-11 NOTE — H&P ADULT - HISTORY OF PRESENT ILLNESS
· 49 y/o male with a PMHx of schizo affective disorder presents to the ED for SI. Pt reports 3 weeks ago  his psychiatrist changed his meds. Pt reports since then he has had worsening ability to think straight and has developed SI.     Pt states "I feel like I had a partial lobectomy." Denies action to hurt himself. NO EtOH use. Pt recently started smoking today. No other complaints at this time.      PAST MEDICAL HISTORY:  Schizo affective disorder     PAST SURGICAL HISTORY:  No significant past surgical history.    Social History:  25pack year , no Etoh    Family History:  no h/o CAD, no h/o CVA            REVIEW OF SYSTEMS:    CONSTITUTIONAL: No weakness, No fevers or chills  ENT: No ear ache, No sorethroat  NECK: No pain, No stiffness  RESPIRATORY: No cough, No wheezing, No hemoptysis; No dyspnea  CARDIOVASCULAR: No chest pain, No palpitations  GASTROINTESTINAL: No abd pain, No nausea, No vomiting, No hematemesis, No diarrhea or constipation. No melena, No hematochezia.  GENITOURINARY: No dysuria, No  hematuria  NEUROLOGICAL: No diplopia, No paresthesia, No motor dysfunction  MUSCULOSKELETAL: No arthralgia, No myalgia  SKIN: No rashes, or lesions   PSYCH: no anxiety, no suicidal ideation    All other review of systems is negative unless indicated above    Vital Signs Last 24 Hrs  T(C): 36.3 (11 Dec 2023 07:24), Max: 36.3 (11 Dec 2023 07:24)  T(F): 97.3 (11 Dec 2023 07:24), Max: 97.3 (11 Dec 2023 07:24)  HR: --  BP: --  BP(mean): --  RR: 16 (11 Dec 2023 07:24) (16 - 16)  SpO2: 100% (11 Dec 2023 07:24) (100% - 100%)        PHYSICAL EXAM:    GENERAL: NAD  HEENT:  NC/AT, EOMI, PERRLA, No scleral icterus, Moist mucous membranes  NECK: Supple, No JVD  CNS:  Alert & Oriented X3, Motor Strength 5/5 B/L upper and lower extremities; DTRs 2+ intact   LUNG: Normal Breath sounds, Clear to auscultation bilaterally, No rales, No rhonchi, No wheezing  HEART: RRR; No murmurs, No rubs  ABDOMEN: +BS, ST/ND/NT  GENITOURINARY: Voiding, Bladder not distended  EXTREMITIES:  2+ Peripheral Pulses, No clubbing, No cyanosis, No tibial edema  MUSCULOSKELTAL: Joints normal ROM, No TTP, No effusion  SKIN: no rashes  RECTAL: deferred, not indicated  BREAST: deferred        MEDICATIONS  (STANDING):  cloZAPine 25 milliGRAM(s) Oral at bedtime  divalproex  milliGRAM(s) Oral daily  influenza   Vaccine 0.5 milliLiter(s) IntraMuscular once  melatonin 3 milliGRAM(s) Oral at bedtime  nicotine - 21 mG/24Hr(s) Patch 1 Patch Transdermal daily    MEDICATIONS  (PRN):  acetaminophen     Tablet .. 650 milliGRAM(s) Oral every 6 hours PRN Moderate Pain (4 - 6)  hydrOXYzine hydrochloride 25 milliGRAM(s) Oral every 6 hours PRN Anxiety  nicotine  Polacrilex Gum 2 milliGRAM(s) Oral every 4 hours PRN nicotine withdrawal  nicotine  Polacrilex Gum 2 milliGRAM(s) Oral every 2 hours PRN NRT      all labs reviewed  all imaging reviewed    a/p:    1. Bipolar Disorder decompensation :   c/w Depakote, Clozaril   No medical CI to the above treatment    2. h/o Smoking:  c/w Nicotine replacement   Cessation education done  · 47 y/o male with a PMHx of schizo affective disorder presents to the ED for SI. Pt reports 3 weeks ago  his psychiatrist changed his meds. Pt reports since then he has had worsening ability to think straight and has developed SI.     Pt states "I feel like I had a partial lobectomy." Denies action to hurt himself. NO EtOH use. Pt recently started smoking today. No other complaints at this time.      PAST MEDICAL HISTORY:  Schizo affective disorder     PAST SURGICAL HISTORY:  No significant past surgical history.    Social History:  25pack year , no Etoh    Family History:  no h/o CAD, no h/o CVA            REVIEW OF SYSTEMS:    CONSTITUTIONAL: No weakness, No fevers or chills  ENT: No ear ache, No sorethroat  NECK: No pain, No stiffness  RESPIRATORY: No cough, No wheezing, No hemoptysis; No dyspnea  CARDIOVASCULAR: No chest pain, No palpitations  GASTROINTESTINAL: No abd pain, No nausea, No vomiting, No hematemesis, No diarrhea or constipation. No melena, No hematochezia.  GENITOURINARY: No dysuria, No  hematuria  NEUROLOGICAL: No diplopia, No paresthesia, No motor dysfunction  MUSCULOSKELETAL: No arthralgia, No myalgia  SKIN: No rashes, or lesions   PSYCH: no anxiety, no suicidal ideation    All other review of systems is negative unless indicated above    Vital Signs Last 24 Hrs  T(C): 36.3 (11 Dec 2023 07:24), Max: 36.3 (11 Dec 2023 07:24)  T(F): 97.3 (11 Dec 2023 07:24), Max: 97.3 (11 Dec 2023 07:24)  HR: --  BP: --  BP(mean): --  RR: 16 (11 Dec 2023 07:24) (16 - 16)  SpO2: 100% (11 Dec 2023 07:24) (100% - 100%)        PHYSICAL EXAM:    GENERAL: NAD  HEENT:  NC/AT, EOMI, PERRLA, No scleral icterus, Moist mucous membranes  NECK: Supple, No JVD  CNS:  Alert & Oriented X3, Motor Strength 5/5 B/L upper and lower extremities; DTRs 2+ intact   LUNG: Normal Breath sounds, Clear to auscultation bilaterally, No rales, No rhonchi, No wheezing  HEART: RRR; No murmurs, No rubs  ABDOMEN: +BS, ST/ND/NT  GENITOURINARY: Voiding, Bladder not distended  EXTREMITIES:  2+ Peripheral Pulses, No clubbing, No cyanosis, No tibial edema  MUSCULOSKELTAL: Joints normal ROM, No TTP, No effusion  SKIN: no rashes  RECTAL: deferred, not indicated  BREAST: deferred        MEDICATIONS  (STANDING):  cloZAPine 25 milliGRAM(s) Oral at bedtime  divalproex  milliGRAM(s) Oral daily  influenza   Vaccine 0.5 milliLiter(s) IntraMuscular once  melatonin 3 milliGRAM(s) Oral at bedtime  nicotine - 21 mG/24Hr(s) Patch 1 Patch Transdermal daily    MEDICATIONS  (PRN):  acetaminophen     Tablet .. 650 milliGRAM(s) Oral every 6 hours PRN Moderate Pain (4 - 6)  hydrOXYzine hydrochloride 25 milliGRAM(s) Oral every 6 hours PRN Anxiety  nicotine  Polacrilex Gum 2 milliGRAM(s) Oral every 4 hours PRN nicotine withdrawal  nicotine  Polacrilex Gum 2 milliGRAM(s) Oral every 2 hours PRN NRT      all labs reviewed  all imaging reviewed    a/p:    1. Bipolar Disorder decompensation :   c/w Depakote, Clozaril   No medical CI to the above treatment    2. h/o Smoking:  c/w Nicotine replacement   Cessation education done

## 2023-12-11 NOTE — BH INPATIENT PSYCHIATRY PROGRESS NOTE - PRN MEDS
MEDICATIONS  (PRN):  acetaminophen     Tablet .. 650 milliGRAM(s) Oral every 6 hours PRN Moderate Pain (4 - 6)  hydrOXYzine hydrochloride 25 milliGRAM(s) Oral every 6 hours PRN Anxiety  nicotine  Polacrilex Gum 2 milliGRAM(s) Oral every 2 hours PRN NRT  nicotine  Polacrilex Gum 2 milliGRAM(s) Oral every 4 hours PRN nicotine withdrawal   MEDICATIONS  (PRN):  acetaminophen     Tablet .. 650 milliGRAM(s) Oral every 6 hours PRN Moderate Pain (4 - 6)  hydrOXYzine hydrochloride 25 milliGRAM(s) Oral every 6 hours PRN Anxiety  nicotine  Polacrilex Gum 2 milliGRAM(s) Oral every 4 hours PRN nicotine withdrawal  nicotine  Polacrilex Gum 2 milliGRAM(s) Oral every 2 hours PRN NRT

## 2023-12-11 NOTE — BH INPATIENT PSYCHIATRY PROGRESS NOTE - NSBHFUPINTERVALHXFT_PSY_A_CORE
12/11/2023: Patient a 49 y/o single male, hx of SAD, domiciled at Supportive Housing at [facility], no children, on SSD, Last hospitalized in [facility] in 2010, discharged and since then was following at [facility] and was on Clozapine with Prolixin Decanoate. He endorses that he was on Prolixin Decanoate 25 mg IM/3 weeks, last received on Wednesday 12/06/2023, next due on 12/27/2023. He endorses that he was on low dose of Clozapine and his current Psychiatrist took him off and substituted with other meds for 2-3 months and since then things are not the way it is . 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 and which seemed to annoy him, he has not been sleeping for past 2 months and he just feels that he is not feeling the way he felt when he was on Clozapine and wants to go back to Clozapine for stability/safety. He denied S/H/I/P, denied drug abuse hx, relapsed on Cigarettes smoking and now with Nicotine Patch with Gum and denied other drug abuse hx.     Medically has Rt. 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    Patient is casually dressed, this morning he is bed but later seen walking on the floor.  He is approachable and communicative.  Denies any thoughts of harming self or anybody else.  No anger or agitation.  His mood is stable.  Patient reports continuation of hearing voices when asked what is saying he responded with "not much:.   Denies thoughts about harming himself and others.  He says he was not because of my thoughts and would like to go back on it 12/11/2023: Patient a 49 y/o single male, hx of SAD, domiciled at Supportive Housing at [facility], no children, on SSD, Last hospitalized in [facility] in 2010, discharged and since then was following at [facility] and was on Clozapine with Prolixin Decanoate. He endorses that he was on Prolixin Decanoate 25 mg IM/3 weeks, last received on Wednesday 12/06/2023, next due on 12/27/2023. He endorses that he was on low dose of Clozapine and his current Psychiatrist took him off and substituted with other meds for 2-3 months and since then things are not the way it is . 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 and which seemed to annoy him, he has not been sleeping for past 2 months and he just feels that he is not feeling the way he felt when he was on Clozapine and wants to go back to Clozapine for stability/safety. He denied S/H/I/P, denied drug abuse hx, relapsed on Cigarettes smoking and now with Nicotine Patch with Gum and denied other drug abuse hx.     Medically has Rt. 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    Patient is casually dressed, this morning he is bed but later seen walking on the floor.  He is approachable and communicative.  Denies any thoughts of harming self or anybody else.  No anger or agitation.  His mood is stable.  Patient reports continuation of hearing voices when asked what is saying he responded with "not much:.   Denies thoughts about harming himself and others.  He says he was not because of my thoughts and would like to go back on it 12/11/2023: Patient a 47 y/o single male, hx of SAD, domiciled at Supportive Housing at [facility], no children, on SSD, Last hospitalized in [facility] in 2010, discharged and since then was following at [facility] and was on Clozapine with Prolixin Decanoate. He endorses that he was on Prolixin Decanoate 25 mg IM/3 weeks, last received on Wednesday 12/06/2023, next due on 12/27/2023. He endorses that he was on low dose of Clozapine and his current Psychiatrist took him off and substituted with other meds for 2-3 months and since then things are not the way it is . 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 and which seemed to annoy him, he has not been sleeping for past 2 months and he just feels that he is not feeling the way he felt when he was on Clozapine and wants to go back to Clozapine for stability/safety. He denied S/H/I/P, denied drug abuse hx, relapsed on Cigarettes smoking and now with Nicotine Patch with Gum and denied other drug abuse hx.     Medically has Rt. 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    Patient is casually dressed, this morning he is bed but later seen walking on the floor.  He is approachable and communicative.  Denies any thoughts of harming self or anybody else.  No anger or agitation.  His mood is stable.  Patient reports continuation of hearing voices when asked what is saying he responded with "not much:.   Denies thoughts about harming himself and others.  He says he was not because of my thoughts and would like to go back on it 12/11/2023: Patient a 49 y/o single male, hx of SAD, domiciled at Supportive Housing at [facility], no children, on SSD, Last hospitalized in [facility] in 2010, discharged and since then was following at [facility] and was on Clozapine with Prolixin Decanoate. He endorses that he was on Prolixin Decanoate 25 mg IM/3 weeks, last received on Wednesday 12/06/2023, next due on 12/27/2023. He endorses that he was on low dose of Clozapine and his current Psychiatrist took him off and substituted with other meds for 2-3 months and since then things are not the way it is . 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 and which seemed to annoy him, he has not been sleeping for past 2 months and he just feels that he is not feeling the way he felt when he was on Clozapine and wants to go back to Clozapine for stability/safety. He denied S/H/I/P, denied drug abuse hx, relapsed on Cigarettes smoking and now with Nicotine Patch with Gum and denied other drug abuse hx.     Medically has Rt. 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    Patient is casually dressed, this morning he is bed but later seen walking on the floor.  He is approachable and communicative.  Denies any thoughts of harming self or anybody else.  No anger or agitation.  His mood is stable.  Patient reports continuation of hearing voices when asked what is saying he responded with "not much:.   Denies thoughts about harming himself and others.  He says he was not because of my thoughts and would like to go back on it

## 2023-12-12 PROCEDURE — 99232 SBSQ HOSP IP/OBS MODERATE 35: CPT

## 2023-12-12 RX ORDER — CLOZAPINE 150 MG/1
50 TABLET, ORALLY DISINTEGRATING ORAL AT BEDTIME
Refills: 0 | Status: DISCONTINUED | OUTPATIENT
Start: 2023-12-12 | End: 2023-12-14

## 2023-12-12 RX ADMIN — Medication 1 PATCH: at 10:15

## 2023-12-12 RX ADMIN — Medication 1 PATCH: at 18:42

## 2023-12-12 RX ADMIN — Medication 2 MILLIGRAM(S): at 14:39

## 2023-12-12 RX ADMIN — Medication 2 MILLIGRAM(S): at 20:26

## 2023-12-12 RX ADMIN — Medication 3 MILLIGRAM(S): at 20:26

## 2023-12-12 RX ADMIN — CLOZAPINE 50 MILLIGRAM(S): 150 TABLET, ORALLY DISINTEGRATING ORAL at 20:26

## 2023-12-12 RX ADMIN — DIVALPROEX SODIUM 500 MILLIGRAM(S): 500 TABLET, DELAYED RELEASE ORAL at 10:16

## 2023-12-12 NOTE — BH INPATIENT PSYCHIATRY PROGRESS NOTE - NSBHASSESSSUMMFT_PSY_ALL_CORE
48 year old single, domiciled in [facility], unemployed, on disability. PPHx of schizophrenia, anxiety multiple prior admissions last in 2012 at [facility]. Was previously on Clozaril 50 mg, doing well aside from feeling tired, changed psychiatrist in July and is now on Depakote and Seroquel, reports symptoms have worsened on this regimen. PMHx of HTN, HDL and restless leg who self presents to hospital for psychiatric evaluation.  Patient presents with Schizoaffective disorder with exacerbation of symptoms since recent medication change. No SI/HI. +AH, not command in nature. +Vague paranoia and delusions. These symptoms represent a change from baseline from which the patient cannot be reasonably expected to improve with current level of care. The patient is requesting voluntary inpatient psychiatric hospitalization for safety and stabilization of psychiatric symptoms.  h/o schizophrenia vs schizoaffective d/o    Continue divalproex  milliGRAM(s) Oral daily  melatonin 3 milliGRAM(s) Oral at bedtime  QUEtiapine 100 milliGRAM(s) Oral at bedtime  Patient does not need one-to-one observation.  Pt is asking to start Clozaril Will defer to primary provider.          48 year old single, domiciled in [facility], unemployed, on disability. PPHx of schizophrenia, anxiety multiple prior admissions last in 2012 at [facility]. Was previously on Clozaril 50 mg, doing well aside from feeling tired, changed psychiatrist in July and is now on Depakote and Seroquel, reports symptoms have worsened on this regimen. PMHx of HTN, HDL and restless leg who self presents to hospital for psychiatric evaluation.  Patient presents with Schizoaffective disorder with exacerbation of symptoms since recent medication change. No SI/HI. +AH, not command in nature. +Vague paranoia and delusions. These symptoms represent a change from baseline from which the patient cannot be reasonably expected to improve with current level of care. The patient is requesting voluntary inpatient psychiatric hospitalization for safety and stabilization of psychiatric symptoms.  h/o schizophrenia vs schizoaffective d/o    Continue divalproex  milliGRAM(s) Oral daily  melatonin 3 milliGRAM(s) Oral at bedtime  QUEtiapine 100 milliGRAM(s) Oral at bedtime  Patient does not need one-to-one observation.  Pt is asking to start Clozaril Will defer to primary provider.

## 2023-12-12 NOTE — BH INPATIENT PSYCHIATRY PROGRESS NOTE - NSBHMETABOLIC_PSY_ALL_CORE_FT
BMI: BMI (kg/m2): 36.2 (12-08-23 @ 16:20)  HbA1c: A1C with Estimated Average Glucose Result: 6.8 % (12-09-23 @ 07:10)    Glucose:   BP: --Vital Signs Last 24 Hrs  T(C): 36.8 (12-12-23 @ 08:17), Max: 36.8 (12-12-23 @ 08:17)  T(F): 98.3 (12-12-23 @ 08:17), Max: 98.3 (12-12-23 @ 08:17)  HR: --  BP: --  BP(mean): --  RR: 18 (12-12-23 @ 08:17) (18 - 18)  SpO2: 100% (12-12-23 @ 08:17) (98% - 100%)    Orthostatic VS  12-12-23 @ 08:17  Lying BP: --/-- HR: --  Sitting BP: 150/100 HR: 78  Standing BP: 163/86 HR: 87  Site: upper right arm  Mode: electronic  Orthostatic VS  12-11-23 @ 19:56  Lying BP: --/-- HR: --  Sitting BP: 169/101 HR: 85  Standing BP: 172/105 HR: 90  Site: upper right arm  Mode: electronic  Orthostatic VS  12-11-23 @ 07:24  Lying BP: --/-- HR: --  Sitting BP: 142/89 HR: 79  Standing BP: 147/96 HR: 85  Site: upper right arm  Mode: electronic    Lipid Panel: Date/Time: 12-09-23 @ 07:10  Cholesterol, Serum: 131  LDL Cholesterol Calculated: 69  HDL Cholesterol, Serum: 29  Total Cholesterol/HDL Ration Measurement: --  Triglycerides, Serum: 195

## 2023-12-12 NOTE — BH INPATIENT PSYCHIATRY PROGRESS NOTE - NSBHFUPINTERVALHXFT_PSY_A_CORE
12/12/2023: Patient was seen today AM chart reviewed and discussed in team. He took Clozapine yesterday night, and to day Clozapine further increased to 50 mg HS and to stay at this dosage for few days before can be increased further. No acute issues, denies any S/H/I/P, still to early for any change of symptoms at this time.    12/11/2023: Patient a 47 y/o single male, hx of SAD, domiciled at Supportive Housing at [facility], no children, on SSD, Last hospitalized in [facility] in 2010, discharged and since then was following at [facility] and was on Clozapine with Prolixin Decanoate. He endorses that he was on Prolixin Decanoate 25 mg IM/3 weeks, last received on Wednesday 12/06/2023, next due on 12/27/2023. He endorses that he was on low dose of Clozapine and his current Psychiatrist took him off and substituted with other meds for 2-3 months and since then things are not the way it is . 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 and which seemed to annoy him, he has not been sleeping for past 2 months and he just feels that he is not feeling the way he felt when he was on Clozapine and wants to go back to Clozapine for stability/safety. He denied S/H/I/P, denied drug abuse hx, relapsed on Cigarettes smoking and now with Nicotine Patch with Gum and denied other drug abuse hx.     Medically has Rt. 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    Patient is casually dressed, this morning he is bed but later seen walking on the floor.  He is approachable and communicative.  Denies any thoughts of harming self or anybody else.  No anger or agitation.  His mood is stable.  Patient reports continuation of hearing voices when asked what is saying he responded with "not much:.   Denies thoughts about harming himself and others.  He says he was not because of my thoughts and would like to go back on it 12/12/2023: Patient was seen today AM chart reviewed and discussed in team. He took Clozapine yesterday night, and to day Clozapine further increased to 50 mg HS and to stay at this dosage for few days before can be increased further. No acute issues, denies any S/H/I/P, still to early for any change of symptoms at this time.    12/11/2023: Patient a 47 y/o single male, hx of SAD, domiciled at Supportive Housing at [facility], no children, on SSD, Last hospitalized in [facility] in 2010, discharged and since then was following at [facility] and was on Clozapine with Prolixin Decanoate. He endorses that he was on Prolixin Decanoate 25 mg IM/3 weeks, last received on Wednesday 12/06/2023, next due on 12/27/2023. He endorses that he was on low dose of Clozapine and his current Psychiatrist took him off and substituted with other meds for 2-3 months and since then things are not the way it is . 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 and which seemed to annoy him, he has not been sleeping for past 2 months and he just feels that he is not feeling the way he felt when he was on Clozapine and wants to go back to Clozapine for stability/safety. He denied S/H/I/P, denied drug abuse hx, relapsed on Cigarettes smoking and now with Nicotine Patch with Gum and denied other drug abuse hx.     Medically has Rt. 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    Patient is casually dressed, this morning he is bed but later seen walking on the floor.  He is approachable and communicative.  Denies any thoughts of harming self or anybody else.  No anger or agitation.  His mood is stable.  Patient reports continuation of hearing voices when asked what is saying he responded with "not much:.   Denies thoughts about harming himself and others.  He says he was not because of my thoughts and would like to go back on it

## 2023-12-12 NOTE — BH INPATIENT PSYCHIATRY PROGRESS NOTE - PRN MEDS
MEDICATIONS  (PRN):  acetaminophen     Tablet .. 650 milliGRAM(s) Oral every 6 hours PRN Moderate Pain (4 - 6)  hydrOXYzine hydrochloride 25 milliGRAM(s) Oral every 6 hours PRN Anxiety  nicotine  Polacrilex Gum 2 milliGRAM(s) Oral every 2 hours PRN NRT  nicotine  Polacrilex Gum 2 milliGRAM(s) Oral every 4 hours PRN nicotine withdrawal

## 2023-12-12 NOTE — BH INPATIENT PSYCHIATRY PROGRESS NOTE - CURRENT MEDICATION
MEDICATIONS  (STANDING):  cloZAPine 50 milliGRAM(s) Oral at bedtime  divalproex  milliGRAM(s) Oral daily  influenza   Vaccine 0.5 milliLiter(s) IntraMuscular once  melatonin 3 milliGRAM(s) Oral at bedtime  nicotine - 21 mG/24Hr(s) Patch 1 Patch Transdermal daily    MEDICATIONS  (PRN):  acetaminophen     Tablet .. 650 milliGRAM(s) Oral every 6 hours PRN Moderate Pain (4 - 6)  hydrOXYzine hydrochloride 25 milliGRAM(s) Oral every 6 hours PRN Anxiety  nicotine  Polacrilex Gum 2 milliGRAM(s) Oral every 2 hours PRN NRT  nicotine  Polacrilex Gum 2 milliGRAM(s) Oral every 4 hours PRN nicotine withdrawal

## 2023-12-12 NOTE — BH INPATIENT PSYCHIATRY PROGRESS NOTE - NSBHCHARTREVIEWVS_PSY_A_CORE FT
Vital Signs Last 24 Hrs  T(C): 36.8 (12-12-23 @ 08:17), Max: 36.8 (12-12-23 @ 08:17)  T(F): 98.3 (12-12-23 @ 08:17), Max: 98.3 (12-12-23 @ 08:17)  HR: --  BP: --  BP(mean): --  RR: 18 (12-12-23 @ 08:17) (18 - 18)  SpO2: 100% (12-12-23 @ 08:17) (98% - 100%)    Orthostatic VS  12-12-23 @ 08:17  Lying BP: --/-- HR: --  Sitting BP: 150/100 HR: 78  Standing BP: 163/86 HR: 87  Site: upper right arm  Mode: electronic  Orthostatic VS  12-11-23 @ 19:56  Lying BP: --/-- HR: --  Sitting BP: 169/101 HR: 85  Standing BP: 172/105 HR: 90  Site: upper right arm  Mode: electronic  Orthostatic VS  12-11-23 @ 07:24  Lying BP: --/-- HR: --  Sitting BP: 142/89 HR: 79  Standing BP: 147/96 HR: 85  Site: upper right arm  Mode: electronic

## 2023-12-12 NOTE — BH INPATIENT PSYCHIATRY PROGRESS NOTE - NSBHATTESTBILLING_PSY_A_CORE
14616-Kxrxaixdoy OBS or IP - moderate complexity OR 35-49 mins 32964-Slykpojyzv OBS or IP - moderate complexity OR 35-49 mins

## 2023-12-12 NOTE — BH INPATIENT PSYCHIATRY ASSESSMENT NOTE - NSBHMSELANG_PSY_A_CORE
absent Is This A New Presentation, Or A Follow-Up?: Rash Additional History: Aquaphor provides relief. No abnormalities noted

## 2023-12-13 PROCEDURE — 99232 SBSQ HOSP IP/OBS MODERATE 35: CPT

## 2023-12-13 RX ADMIN — Medication 2 MILLIGRAM(S): at 20:37

## 2023-12-13 RX ADMIN — Medication 3 MILLIGRAM(S): at 20:38

## 2023-12-13 RX ADMIN — DIVALPROEX SODIUM 500 MILLIGRAM(S): 500 TABLET, DELAYED RELEASE ORAL at 09:56

## 2023-12-13 RX ADMIN — Medication 1 PATCH: at 09:57

## 2023-12-13 RX ADMIN — CLOZAPINE 50 MILLIGRAM(S): 150 TABLET, ORALLY DISINTEGRATING ORAL at 20:37

## 2023-12-13 NOTE — BH INPATIENT PSYCHIATRY PROGRESS NOTE - NSBHMETABOLIC_PSY_ALL_CORE_FT
BMI: BMI (kg/m2): 36.2 (12-08-23 @ 16:20)  HbA1c: A1C with Estimated Average Glucose Result: 6.8 % (12-09-23 @ 07:10)    Glucose:   BP: --Vital Signs Last 24 Hrs  T(C): 36.3 (12-13-23 @ 08:06), Max: 36.8 (12-12-23 @ 20:00)  T(F): 97.4 (12-13-23 @ 08:06), Max: 98.2 (12-12-23 @ 20:00)  HR: --  BP: --  BP(mean): --  RR: 16 (12-13-23 @ 08:06) (16 - 18)  SpO2: 98% (12-13-23 @ 08:06) (98% - 100%)    Orthostatic VS  12-13-23 @ 08:06  Lying BP: --/-- HR: --  Sitting BP: 148/90 HR: 79  Standing BP: 130/92 HR: 90  Site: upper right arm  Mode: electronic  Orthostatic VS  12-12-23 @ 20:00  Lying BP: --/-- HR: --  Sitting BP: 143/90 HR: 81  Standing BP: 143/79 HR: 92  Site: --  Mode: --  Orthostatic VS  12-12-23 @ 08:17  Lying BP: --/-- HR: --  Sitting BP: 150/100 HR: 78  Standing BP: 163/86 HR: 87  Site: upper right arm  Mode: electronic  Orthostatic VS  12-11-23 @ 19:56  Lying BP: --/-- HR: --  Sitting BP: 169/101 HR: 85  Standing BP: 172/105 HR: 90  Site: upper right arm  Mode: electronic    Lipid Panel: Date/Time: 12-09-23 @ 07:10  Cholesterol, Serum: 131  LDL Cholesterol Calculated: 69  HDL Cholesterol, Serum: 29  Total Cholesterol/HDL Ration Measurement: --  Triglycerides, Serum: 195

## 2023-12-13 NOTE — BH INPATIENT PSYCHIATRY PROGRESS NOTE - NSBHATTESTBILLING_PSY_A_CORE
43683-Nbokksqbwy OBS or IP - moderate complexity OR 35-49 mins [id number]-Rpiattocti OBS or IP - moderate complexity OR 35-49 mins

## 2023-12-13 NOTE — BH INPATIENT PSYCHIATRY PROGRESS NOTE - NSBHFUPINTERVALHXFT_PSY_A_CORE
12/13/2023: Patient was seen today AM, chart reviewed and discussed in team today. He is meds complaint, still remains symptomatic and was seen shouting in the hallway for no reason, he endorses that he feels a little better, no aggression or agitation reported. Clozapine 50 mg HS continued and patient aware. No S/H/I/P noted, no [prior SI/SA, denied drug abuse hx. Limited I+J.    12/12/2023: Patient was seen today AM chart reviewed and discussed in team. He took Clozapine yesterday night, and to day Clozapine further increased to 50 mg HS and to stay at this dosage for few days before can be increased further. No acute issues, denies any S/H/I/P, still to early for any change of symptoms at this time.    12/11/2023: Patient a 47 y/o single male, hx of SAD, domiciled at Supportive Housing at [facility], no children, on SSD, Last hospitalized in [facility] in 2010, discharged and since then was following at [facility] and was on Clozapine with Prolixin Decanoate. He endorses that he was on Prolixin Decanoate 25 mg IM/3 weeks, last received on Wednesday 12/06/2023, next due on 12/27/2023. He endorses that he was on low dose of Clozapine and his current Psychiatrist took him off and substituted with other meds for 2-3 months and since then things are not the way it is . 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 and which seemed to annoy him, he has not been sleeping for past 2 months and he just feels that he is not feeling the way he felt when he was on Clozapine and wants to go back to Clozapine for stability/safety. He denied S/H/I/P, denied drug abuse hx, relapsed on Cigarettes smoking and now with Nicotine Patch with Gum and denied other drug abuse hx.     Medically has Rt. 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    Patient is casually dressed, this morning he is bed but later seen walking on the floor.  He is approachable and communicative.  Denies any thoughts of harming self or anybody else.  No anger or agitation.  His mood is stable.  Patient reports continuation of hearing voices when asked what is saying he responded with "not much:.   Denies thoughts about harming himself and others.  He says he was not because of my thoughts and would like to go back on it 12/13/2023: Patient was seen today AM, chart reviewed and discussed in team today. He is meds complaint, still remains symptomatic and was seen shouting in the hallway for no reason, he endorses that he feels a little better, no aggression or agitation reported. Clozapine 50 mg HS continued and patient aware. No S/H/I/P noted, no [prior SI/SA, denied drug abuse hx. Limited I+J.    12/12/2023: Patient was seen today AM chart reviewed and discussed in team. He took Clozapine yesterday night, and to day Clozapine further increased to 50 mg HS and to stay at this dosage for few days before can be increased further. No acute issues, denies any S/H/I/P, still to early for any change of symptoms at this time.    12/11/2023: Patient a 47 y/o single male, hx of SAD, domiciled at Supportive Housing at [facility], no children, on SSD, Last hospitalized in [facility] in 2010, discharged and since then was following at [facility] and was on Clozapine with Prolixin Decanoate. He endorses that he was on Prolixin Decanoate 25 mg IM/3 weeks, last received on Wednesday 12/06/2023, next due on 12/27/2023. He endorses that he was on low dose of Clozapine and his current Psychiatrist took him off and substituted with other meds for 2-3 months and since then things are not the way it is . 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 and which seemed to annoy him, he has not been sleeping for past 2 months and he just feels that he is not feeling the way he felt when he was on Clozapine and wants to go back to Clozapine for stability/safety. He denied S/H/I/P, denied drug abuse hx, relapsed on Cigarettes smoking and now with Nicotine Patch with Gum and denied other drug abuse hx.     Medically has Rt. 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    Patient is casually dressed, this morning he is bed but later seen walking on the floor.  He is approachable and communicative.  Denies any thoughts of harming self or anybody else.  No anger or agitation.  His mood is stable.  Patient reports continuation of hearing voices when asked what is saying he responded with "not much:.   Denies thoughts about harming himself and others.  He says he was not because of my thoughts and would like to go back on it

## 2023-12-13 NOTE — BH INPATIENT PSYCHIATRY PROGRESS NOTE - NSBHASSESSSUMMFT_PSY_ALL_CORE
48 year old single, domiciled in [place], unemployed, on disability. PPHx of schizophrenia, anxiety multiple prior admissions last in 2012 at [facility]. Was previously on Clozaril 50 mg, doing well aside from feeling tired, changed psychiatrist in July and is now on Depakote and Seroquel, reports symptoms have worsened on this regimen. PMHx of HTN, HDL and restless leg who self presents to hospital for psychiatric evaluation.  Patient presents with Schizoaffective disorder with exacerbation of symptoms since recent medication change. No SI/HI. +AH, not command in nature. +Vague paranoia and delusions. These symptoms represent a change from baseline from which the patient cannot be reasonably expected to improve with current level of care. The patient is requesting voluntary inpatient psychiatric hospitalization for safety and stabilization of psychiatric symptoms.  h/o schizophrenia vs schizoaffective d/o    Continue divalproex  milliGRAM(s) Oral daily  melatonin 3 milliGRAM(s) Oral at bedtime  QUEtiapine 100 milliGRAM(s) Oral at bedtime  Patient does not need one-to-one observation.  Pt is asking to start Clozaril Will defer to primary provider.          48 year old single, domiciled in [place] housing, unemployed, on disability. PPHx of schizophrenia, anxiety multiple prior admissions last in 2012 at [facility]. Was previously on Clozaril 50 mg, doing well aside from feeling tired, changed psychiatrist in July and is now on Depakote and Seroquel, reports symptoms have worsened on this regimen. PMHx of HTN, HDL and restless leg who self presents to hospital for psychiatric evaluation.  Patient presents with Schizoaffective disorder with exacerbation of symptoms since recent medication change. No SI/HI. +AH, not command in nature. +Vague paranoia and delusions. These symptoms represent a change from baseline from which the patient cannot be reasonably expected to improve with current level of care. The patient is requesting voluntary inpatient psychiatric hospitalization for safety and stabilization of psychiatric symptoms.  h/o schizophrenia vs schizoaffective d/o    Continue divalproex  milliGRAM(s) Oral daily  melatonin 3 milliGRAM(s) Oral at bedtime  QUEtiapine 100 milliGRAM(s) Oral at bedtime  Patient does not need one-to-one observation.  Pt is asking to start Clozaril Will defer to primary provider.

## 2023-12-13 NOTE — BH INPATIENT PSYCHIATRY PROGRESS NOTE - NSBHCHARTREVIEWVS_PSY_A_CORE FT
Vital Signs Last 24 Hrs  T(C): 36.3 (12-13-23 @ 08:06), Max: 36.8 (12-12-23 @ 20:00)  T(F): 97.4 (12-13-23 @ 08:06), Max: 98.2 (12-12-23 @ 20:00)  HR: --  BP: --  BP(mean): --  RR: 16 (12-13-23 @ 08:06) (16 - 18)  SpO2: 98% (12-13-23 @ 08:06) (98% - 100%)    Orthostatic VS  12-13-23 @ 08:06  Lying BP: --/-- HR: --  Sitting BP: 148/90 HR: 79  Standing BP: 130/92 HR: 90  Site: upper right arm  Mode: electronic  Orthostatic VS  12-12-23 @ 20:00  Lying BP: --/-- HR: --  Sitting BP: 143/90 HR: 81  Standing BP: 143/79 HR: 92  Site: --  Mode: --  Orthostatic VS  12-12-23 @ 08:17  Lying BP: --/-- HR: --  Sitting BP: 150/100 HR: 78  Standing BP: 163/86 HR: 87  Site: upper right arm  Mode: electronic  Orthostatic VS  12-11-23 @ 19:56  Lying BP: --/-- HR: --  Sitting BP: 169/101 HR: 85  Standing BP: 172/105 HR: 90  Site: upper right arm  Mode: electronic

## 2023-12-14 PROCEDURE — 99232 SBSQ HOSP IP/OBS MODERATE 35: CPT

## 2023-12-14 RX ORDER — CLOZAPINE 150 MG/1
100 TABLET, ORALLY DISINTEGRATING ORAL AT BEDTIME
Refills: 0 | Status: DISCONTINUED | OUTPATIENT
Start: 2023-12-14 | End: 2023-12-18

## 2023-12-14 RX ADMIN — Medication 2 MILLIGRAM(S): at 09:47

## 2023-12-14 RX ADMIN — Medication 1 PATCH: at 09:44

## 2023-12-14 RX ADMIN — CLOZAPINE 100 MILLIGRAM(S): 150 TABLET, ORALLY DISINTEGRATING ORAL at 20:57

## 2023-12-14 RX ADMIN — Medication 3 MILLIGRAM(S): at 20:57

## 2023-12-14 RX ADMIN — Medication 2 MILLIGRAM(S): at 18:08

## 2023-12-14 RX ADMIN — DIVALPROEX SODIUM 500 MILLIGRAM(S): 500 TABLET, DELAYED RELEASE ORAL at 09:44

## 2023-12-14 NOTE — BH INPATIENT PSYCHIATRY PROGRESS NOTE - NSBHASSESSSUMMFT_PSY_ALL_CORE
48 year old single, domiciled in [place] [facility], unemployed, on disability. PPHx of schizophrenia, anxiety multiple prior admissions last in 2012 at [facility]. Was previously on Clozaril 50 mg, doing well aside from feeling tired, changed psychiatrist in July and is now on Depakote and Seroquel, reports symptoms have worsened on this regimen. PMHx of HTN, HDL and restless leg who self presents to hospital for psychiatric evaluation.  Patient presents with Schizoaffective disorder with exacerbation of symptoms since recent medication change. No SI/HI. +AH, not command in nature. +Vague paranoia and delusions. These symptoms represent a change from baseline from which the patient cannot be reasonably expected to improve with current level of care. The patient is requesting voluntary inpatient psychiatric hospitalization for safety and stabilization of psychiatric symptoms.  h/o schizophrenia vs schizoaffective d/o    Continue divalproex  milliGRAM(s) Oral daily  melatonin 3 milliGRAM(s) Oral at bedtime  QUEtiapine 100 milliGRAM(s) Oral at bedtime  Patient does not need one-to-one observation.  Pt is asking to start Clozaril Will defer to primary provider.          48 year old single, domiciled in [place] [facility], unemployed, on disability. PPHx of schizophrenia, anxiety multiple prior admissions last in 2012 at [facility]. Was previously on Clozaril 50 mg, doing well aside from feeling tired, changed psychiatrist in July and is now on Depakote and Seroquel, reports symptoms have worsened on this regimen. PMHx of HTN, HDL and restless leg who self presents to hospital for psychiatric evaluation.  Patient presents with Schizoaffective disorder with exacerbation of symptoms since recent medication change. No SI/HI. +AH, not command in nature. +Vague paranoia and delusions. These symptoms represent a change from baseline from which the patient cannot be reasonably expected to improve with current level of care. The patient is requesting voluntary inpatient psychiatric hospitalization for safety and stabilization of psychiatric symptoms.  h/o schizophrenia vs schizoaffective d/o    Continue divalproex  milliGRAM(s) Oral daily  melatonin 3 milliGRAM(s) Oral at bedtime  QUEtiapine 100 milliGRAM(s) Oral at bedtime  Patient does not need one-to-one observation.  Pt is asking to start Clozaril Will defer to primary provider.

## 2023-12-14 NOTE — BH INPATIENT PSYCHIATRY PROGRESS NOTE - NSBHCHARTREVIEWVS_PSY_A_CORE FT
Vital Signs Last 24 Hrs  T(C): 36.3 (12-14-23 @ 07:39), Max: 36.3 (12-14-23 @ 07:39)  T(F): 97.4 (12-14-23 @ 07:39), Max: 97.4 (12-14-23 @ 07:39)  HR: --  BP: --  BP(mean): --  RR: 18 (12-14-23 @ 07:39) (16 - 18)  SpO2: 97% (12-14-23 @ 07:39) (97% - 97%)    Orthostatic VS  12-14-23 @ 07:39  Lying BP: --/-- HR: --  Sitting BP: 140/91 HR: 85  Standing BP: 149/97 HR: 92  Site: upper right arm  Mode: electronic  Orthostatic VS  12-13-23 @ 20:54  Lying BP: --/-- HR: --  Sitting BP: 151/100 HR: 86  Standing BP: 140/99 HR: 94  Site: --  Mode: --  Orthostatic VS  12-13-23 @ 08:06  Lying BP: --/-- HR: --  Sitting BP: 148/90 HR: 79  Standing BP: 130/92 HR: 90  Site: upper right arm  Mode: electronic  Orthostatic VS  12-12-23 @ 20:00  Lying BP: --/-- HR: --  Sitting BP: 143/90 HR: 81  Standing BP: 143/79 HR: 92  Site: --  Mode: --

## 2023-12-14 NOTE — BH INPATIENT PSYCHIATRY PROGRESS NOTE - CURRENT MEDICATION
MEDICATIONS  (STANDING):  cloZAPine 100 milliGRAM(s) Oral at bedtime  divalproex  milliGRAM(s) Oral daily  influenza   Vaccine 0.5 milliLiter(s) IntraMuscular once  melatonin 3 milliGRAM(s) Oral at bedtime  nicotine - 21 mG/24Hr(s) Patch 1 Patch Transdermal daily    MEDICATIONS  (PRN):  acetaminophen     Tablet .. 650 milliGRAM(s) Oral every 6 hours PRN Moderate Pain (4 - 6)  hydrOXYzine hydrochloride 25 milliGRAM(s) Oral every 6 hours PRN Anxiety  nicotine  Polacrilex Gum 2 milliGRAM(s) Oral every 4 hours PRN nicotine withdrawal  nicotine  Polacrilex Gum 2 milliGRAM(s) Oral every 2 hours PRN NRT

## 2023-12-14 NOTE — BH INPATIENT PSYCHIATRY PROGRESS NOTE - NSBHFUPINTERVALHXFT_PSY_A_CORE
12/14/2023: Patient was seen today AM, chart reviewed and discussed in team. He has been meds compliant all along till now, wants to have increased dosage of Clozapine as he wants to get fast relief from the A/H or other mental turmoil he has been facing.  Yesterday he shouted in shower " Get away from me ". He prefers to stay indoors most of the time with limited and select group participation. Clozapine further increased to 100 mg HS and CBC ordered for tomorrow AM. To continue current meds.     12/13/2023: Patient was seen t0oday AM, chart reviewed and discussed in team today. He is meds compliant, still remains symptomatic and was seen shouting in the hallway for no reason, he endorses that he feels a little better, no aggression or agitation reported. Clozapine 50 mg HS continued and patient aware. No S/H/I/P noted, no [prior SI/SA, denied drug abuse hx. Limited I+J.    12/12/2023: Patient was seen today AM chart reviewed and discussed in team. He took Clozapine yesterday night, and to day Clozapine further increased to 50 mg HS and to stay at this dosage for few days before can be increased further. No acute issues, denies any S/H/I/P, still to early for any change of symptoms at this time.    12/11/2023: Patient a 49 y/o single male, hx of SAD, domiciled at Supportive Housing at [facility], no children, on SSD, Last hospitalized in [facility] in 2010, discharged and since then was following at [facility] and was on Clozapine with Prolixin Decanoate. He endorses that he was on Prolixin Decanoate 25 mg IM/3 weeks, last received on Wednesday 12/06/2023, next due on 12/27/2023. He endorses that he was on low dose of Clozapine and his current Psychiatrist took him off and substituted with other meds for 2-3 months and since then things are not the way it is . 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 and which seemed to annoy him, he has not been sleeping for past 2 months and he just feels that he is not feeling the way he felt when he was on Clozapine and wants to go back to Clozapine for stability/safety. He denied S/H/I/P, denied drug abuse hx, relapsed on Cigarettes smoking and now with Nicotine Patch with Gum and denied other drug abuse hx.     Medically has Rt. 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    Patient is casually dressed, this morning he is bed but later seen walking on the floor.  He is approachable and communicative.  Denies any thoughts of harming self or anybody else.  No anger or agitation.  His mood is stable.  Patient reports continuation of hearing voices when asked what is saying he responded with "not much:.   Denies thoughts about harming himself and others.  He says he was not because of my thoughts and would like to go back on it 12/14/2023: Patient was seen today AM, chart reviewed and discussed in team. He has been meds compliant all along till now, wants to have increased dosage of Clozapine as he wants to get fast relief from the A/H or other mental turmoil he has been facing.  Yesterday he shouted in shower " Get away from me ". He prefers to stay indoors most of the time with limited and select group participation. Clozapine further increased to 100 mg HS and CBC ordered for tomorrow AM. To continue current meds.     12/13/2023: Patient was seen t0oday AM, chart reviewed and discussed in team today. He is meds compliant, still remains symptomatic and was seen shouting in the hallway for no reason, he endorses that he feels a little better, no aggression or agitation reported. Clozapine 50 mg HS continued and patient aware. No S/H/I/P noted, no [prior SI/SA, denied drug abuse hx. Limited I+J.    12/12/2023: Patient was seen today AM chart reviewed and discussed in team. He took Clozapine yesterday night, and to day Clozapine further increased to 50 mg HS and to stay at this dosage for few days before can be increased further. No acute issues, denies any S/H/I/P, still to early for any change of symptoms at this time.    12/11/2023: Patient a 49 y/o single male, hx of SAD, domiciled at Supportive Housing at [facility], no children, on SSD, Last hospitalized in [facility] in 2010, discharged and since then was following at [facility] and was on Clozapine with Prolixin Decanoate. He endorses that he was on Prolixin Decanoate 25 mg IM/3 weeks, last received on Wednesday 12/06/2023, next due on 12/27/2023. He endorses that he was on low dose of Clozapine and his current Psychiatrist took him off and substituted with other meds for 2-3 months and since then things are not the way it is . 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 and which seemed to annoy him, he has not been sleeping for past 2 months and he just feels that he is not feeling the way he felt when he was on Clozapine and wants to go back to Clozapine for stability/safety. He denied S/H/I/P, denied drug abuse hx, relapsed on Cigarettes smoking and now with Nicotine Patch with Gum and denied other drug abuse hx.     Medically has Rt. 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    Patient is casually dressed, this morning he is bed but later seen walking on the floor.  He is approachable and communicative.  Denies any thoughts of harming self or anybody else.  No anger or agitation.  His mood is stable.  Patient reports continuation of hearing voices when asked what is saying he responded with "not much:.   Denies thoughts about harming himself and others.  He says he was not because of my thoughts and would like to go back on it

## 2023-12-14 NOTE — BH INPATIENT PSYCHIATRY PROGRESS NOTE - NSBHATTESTBILLING_PSY_A_CORE
03512-Iwquelhevu OBS or IP - moderate complexity OR 35-49 mins 44359-Vjovdinpef OBS or IP - moderate complexity OR 35-49 mins

## 2023-12-14 NOTE — BH INPATIENT PSYCHIATRY PROGRESS NOTE - NSBHMETABOLIC_PSY_ALL_CORE_FT
BMI: BMI (kg/m2): 36.2 (12-08-23 @ 16:20)  HbA1c: A1C with Estimated Average Glucose Result: 6.8 % (12-09-23 @ 07:10)    Glucose:   BP: --Vital Signs Last 24 Hrs  T(C): 36.3 (12-14-23 @ 07:39), Max: 36.3 (12-14-23 @ 07:39)  T(F): 97.4 (12-14-23 @ 07:39), Max: 97.4 (12-14-23 @ 07:39)  HR: --  BP: --  BP(mean): --  RR: 18 (12-14-23 @ 07:39) (16 - 18)  SpO2: 97% (12-14-23 @ 07:39) (97% - 97%)    Orthostatic VS  12-14-23 @ 07:39  Lying BP: --/-- HR: --  Sitting BP: 140/91 HR: 85  Standing BP: 149/97 HR: 92  Site: upper right arm  Mode: electronic  Orthostatic VS  12-13-23 @ 20:54  Lying BP: --/-- HR: --  Sitting BP: 151/100 HR: 86  Standing BP: 140/99 HR: 94  Site: --  Mode: --  Orthostatic VS  12-13-23 @ 08:06  Lying BP: --/-- HR: --  Sitting BP: 148/90 HR: 79  Standing BP: 130/92 HR: 90  Site: upper right arm  Mode: electronic  Orthostatic VS  12-12-23 @ 20:00  Lying BP: --/-- HR: --  Sitting BP: 143/90 HR: 81  Standing BP: 143/79 HR: 92  Site: --  Mode: --    Lipid Panel: Date/Time: 12-09-23 @ 07:10  Cholesterol, Serum: 131  LDL Cholesterol Calculated: 69  HDL Cholesterol, Serum: 29  Total Cholesterol/HDL Ration Measurement: --  Triglycerides, Serum: 195

## 2023-12-15 LAB
BASOPHILS # BLD AUTO: 0.03 K/UL — SIGNIFICANT CHANGE UP (ref 0–0.2)
BASOPHILS # BLD AUTO: 0.03 K/UL — SIGNIFICANT CHANGE UP (ref 0–0.2)
BASOPHILS NFR BLD AUTO: 0.5 % — SIGNIFICANT CHANGE UP (ref 0–2)
BASOPHILS NFR BLD AUTO: 0.5 % — SIGNIFICANT CHANGE UP (ref 0–2)
EOSINOPHIL # BLD AUTO: 0.43 K/UL — SIGNIFICANT CHANGE UP (ref 0–0.5)
EOSINOPHIL # BLD AUTO: 0.43 K/UL — SIGNIFICANT CHANGE UP (ref 0–0.5)
EOSINOPHIL NFR BLD AUTO: 7.1 % — HIGH (ref 0–6)
EOSINOPHIL NFR BLD AUTO: 7.1 % — HIGH (ref 0–6)
HCT VFR BLD CALC: 42.5 % — SIGNIFICANT CHANGE UP (ref 39–50)
HCT VFR BLD CALC: 42.5 % — SIGNIFICANT CHANGE UP (ref 39–50)
HGB BLD-MCNC: 14.9 G/DL — SIGNIFICANT CHANGE UP (ref 13–17)
HGB BLD-MCNC: 14.9 G/DL — SIGNIFICANT CHANGE UP (ref 13–17)
IMM GRANULOCYTES NFR BLD AUTO: 0.5 % — SIGNIFICANT CHANGE UP (ref 0–0.9)
IMM GRANULOCYTES NFR BLD AUTO: 0.5 % — SIGNIFICANT CHANGE UP (ref 0–0.9)
LYMPHOCYTES # BLD AUTO: 2.23 K/UL — SIGNIFICANT CHANGE UP (ref 1–3.3)
LYMPHOCYTES # BLD AUTO: 2.23 K/UL — SIGNIFICANT CHANGE UP (ref 1–3.3)
LYMPHOCYTES # BLD AUTO: 36.8 % — SIGNIFICANT CHANGE UP (ref 13–44)
LYMPHOCYTES # BLD AUTO: 36.8 % — SIGNIFICANT CHANGE UP (ref 13–44)
MCHC RBC-ENTMCNC: 30.2 PG — SIGNIFICANT CHANGE UP (ref 27–34)
MCHC RBC-ENTMCNC: 30.2 PG — SIGNIFICANT CHANGE UP (ref 27–34)
MCHC RBC-ENTMCNC: 35.1 GM/DL — SIGNIFICANT CHANGE UP (ref 32–36)
MCHC RBC-ENTMCNC: 35.1 GM/DL — SIGNIFICANT CHANGE UP (ref 32–36)
MCV RBC AUTO: 86 FL — SIGNIFICANT CHANGE UP (ref 80–100)
MCV RBC AUTO: 86 FL — SIGNIFICANT CHANGE UP (ref 80–100)
MONOCYTES # BLD AUTO: 0.48 K/UL — SIGNIFICANT CHANGE UP (ref 0–0.9)
MONOCYTES # BLD AUTO: 0.48 K/UL — SIGNIFICANT CHANGE UP (ref 0–0.9)
MONOCYTES NFR BLD AUTO: 7.9 % — SIGNIFICANT CHANGE UP (ref 2–14)
MONOCYTES NFR BLD AUTO: 7.9 % — SIGNIFICANT CHANGE UP (ref 2–14)
NEUTROPHILS # BLD AUTO: 2.86 K/UL — SIGNIFICANT CHANGE UP (ref 1.8–7.4)
NEUTROPHILS # BLD AUTO: 2.86 K/UL — SIGNIFICANT CHANGE UP (ref 1.8–7.4)
NEUTROPHILS NFR BLD AUTO: 47.2 % — SIGNIFICANT CHANGE UP (ref 43–77)
NEUTROPHILS NFR BLD AUTO: 47.2 % — SIGNIFICANT CHANGE UP (ref 43–77)
PLATELET # BLD AUTO: 236 K/UL — SIGNIFICANT CHANGE UP (ref 150–400)
PLATELET # BLD AUTO: 236 K/UL — SIGNIFICANT CHANGE UP (ref 150–400)
RBC # BLD: 4.94 M/UL — SIGNIFICANT CHANGE UP (ref 4.2–5.8)
RBC # BLD: 4.94 M/UL — SIGNIFICANT CHANGE UP (ref 4.2–5.8)
RBC # FLD: 12.8 % — SIGNIFICANT CHANGE UP (ref 10.3–14.5)
RBC # FLD: 12.8 % — SIGNIFICANT CHANGE UP (ref 10.3–14.5)
WBC # BLD: 6.06 K/UL — SIGNIFICANT CHANGE UP (ref 3.8–10.5)
WBC # BLD: 6.06 K/UL — SIGNIFICANT CHANGE UP (ref 3.8–10.5)
WBC # FLD AUTO: 6.06 K/UL — SIGNIFICANT CHANGE UP (ref 3.8–10.5)
WBC # FLD AUTO: 6.06 K/UL — SIGNIFICANT CHANGE UP (ref 3.8–10.5)

## 2023-12-15 PROCEDURE — 99232 SBSQ HOSP IP/OBS MODERATE 35: CPT

## 2023-12-15 RX ADMIN — Medication 2 MILLIGRAM(S): at 08:19

## 2023-12-15 RX ADMIN — CLOZAPINE 100 MILLIGRAM(S): 150 TABLET, ORALLY DISINTEGRATING ORAL at 21:12

## 2023-12-15 RX ADMIN — DIVALPROEX SODIUM 500 MILLIGRAM(S): 500 TABLET, DELAYED RELEASE ORAL at 09:49

## 2023-12-15 RX ADMIN — Medication 2 MILLIGRAM(S): at 18:16

## 2023-12-15 RX ADMIN — Medication 3 MILLIGRAM(S): at 21:12

## 2023-12-15 RX ADMIN — Medication 2 MILLIGRAM(S): at 21:13

## 2023-12-15 RX ADMIN — Medication 1 PATCH: at 09:49

## 2023-12-15 NOTE — BH INPATIENT PSYCHIATRY PROGRESS NOTE - NSBHFUPINTERVALHXFT_PSY_A_CORE
12/15/2023: Patient was seen today AM, chart reviewed and discussed in team. Overall doing a little better, prefers to stay in room or to sleep. On Clozapine 100 mg HS blood count has gone down, still symptomatic with [employer]. To consider adding Lithium to help continue with Clozapine for symptom improvement. To stay on Clozapine 100 mg for now.    12/14/2023: Patient was seen today AM, chart reviewed and discussed in team. He has been meds compliant all along till now, wants to have increased dosage of Clozapine as he wants to get fast relief from the A/H or other mental turmoil he has been facing.  Yesterday he shouted in shower " Get away from me ". He prefers to stay indoors most of the time with limited and select group participation. Clozapine further increased to 100 mg HS and CBC ordered for tomorrow AM. To continue current meds.     12/13/2023: Patient was seen t0oday AM, chart reviewed and discussed in team today. He is meds compliant, still remains symptomatic and was seen shouting in the hallway for no reason, he endorses that he feels a little better, no aggression or agitation reported. Clozapine 50 mg HS continued and patient aware. No S/H/I/P noted, no [prior SI/SA, denied drug abuse hx. Limited I+J.    12/12/2023: Patient was seen today AM chart reviewed and discussed in team. He took Clozapine yesterday night, and to day Clozapine further increased to 50 mg HS and to stay at this dosage for few days before can be increased further. No acute issues, denies any S/H/I/P, still to early for any change of symptoms at this time.    12/11/2023: Patient a 49 y/o single male, hx of SAD, domiciled at [facility] at [facility], no children, on SSD, Last hospitalized in [facility] in 2010, discharged and since then was following at [facility] and was on Clozapine with Prolixin Decanoate. He endorses that he was on Prolixin Decanoate 25 mg IM/3 weeks, last received on Wednesday 12/06/2023, next due on 12/27/2023. He endorses that he was on low dose of Clozapine and his current Psychiatrist took him off and substituted with other meds for 2-3 months and since then things are not the way it is . 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 and which seemed to annoy him, he has not been sleeping for past 2 months and he just feels that he is not feeling the way he felt when he was on Clozapine and wants to go back to Clozapine for stability/safety. He denied S/H/I/P, denied drug abuse hx, relapsed on Cigarettes smoking and now with Nicotine Patch with Gum and denied other drug abuse hx.     Medically has Rt. 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    Patient is casually dressed, this morning he is bed but later seen walking on the floor.  He is approachable and communicative.  Denies any thoughts of harming self or anybody else.  No anger or agitation.  His mood is stable.  Patient reports continuation of hearing voices when asked what is saying he responded with "not much:.   Denies thoughts about harming himself and others.  He says he was not because of my thoughts and would like to go back on it 12/15/2023: Patient was seen today AM, chart reviewed and discussed in team. Overall doing a little better, prefers to stay in room or to sleep. On Clozapine 100 mg HS blood count has gone down, still symptomatic with [employer]. To consider adding Lithium to help continue with Clozapine for symptom improvement. To stay on Clozapine 100 mg for now.    12/14/2023: Patient was seen today AM, chart reviewed and discussed in team. He has been meds compliant all along till now, wants to have increased dosage of Clozapine as he wants to get fast relief from the A/H or other mental turmoil he has been facing.  Yesterday he shouted in shower " Get away from me ". He prefers to stay indoors most of the time with limited and select group participation. Clozapine further increased to 100 mg HS and CBC ordered for tomorrow AM. To continue current meds.     12/13/2023: Patient was seen t0oday AM, chart reviewed and discussed in team today. He is meds compliant, still remains symptomatic and was seen shouting in the hallway for no reason, he endorses that he feels a little better, no aggression or agitation reported. Clozapine 50 mg HS continued and patient aware. No S/H/I/P noted, no [prior SI/SA, denied drug abuse hx. Limited I+J.    12/12/2023: Patient was seen today AM chart reviewed and discussed in team. He took Clozapine yesterday night, and to day Clozapine further increased to 50 mg HS and to stay at this dosage for few days before can be increased further. No acute issues, denies any S/H/I/P, still to early for any change of symptoms at this time.    12/11/2023: Patient a 47 y/o single male, hx of SAD, domiciled at Supportive Housing at [facility], no children, on SSD, Last hospitalized in [facility] in 2010, discharged and since then was following at [facility] and was on Clozapine with Prolixin Decanoate. He endorses that he was on Prolixin Decanoate 25 mg IM/3 weeks, last received on Wednesday 12/06/2023, next due on 12/27/2023. He endorses that he was on low dose of Clozapine and his current Psychiatrist took him off and substituted with other meds for 2-3 months and since then things are not the way it is . 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 and which seemed to annoy him, he has not been sleeping for past 2 months and he just feels that he is not feeling the way he felt when he was on Clozapine and wants to go back to Clozapine for stability/safety. He denied S/H/I/P, denied drug abuse hx, relapsed on Cigarettes smoking and now with Nicotine Patch with Gum and denied other drug abuse hx.     Medically has Rt. 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    Patient is casually dressed, this morning he is bed but later seen walking on the floor.  He is approachable and communicative.  Denies any thoughts of harming self or anybody else.  No anger or agitation.  His mood is stable.  Patient reports continuation of hearing voices when asked what is saying he responded with "not much:.   Denies thoughts about harming himself and others.  He says he was not because of my thoughts and would like to go back on it

## 2023-12-15 NOTE — BH INPATIENT PSYCHIATRY PROGRESS NOTE - CURRENT MEDICATION
MEDICATIONS  (STANDING):  cloZAPine 100 milliGRAM(s) Oral at bedtime  divalproex  milliGRAM(s) Oral daily  influenza   Vaccine 0.5 milliLiter(s) IntraMuscular once  melatonin 3 milliGRAM(s) Oral at bedtime  nicotine - 21 mG/24Hr(s) Patch 1 Patch Transdermal daily    MEDICATIONS  (PRN):  acetaminophen     Tablet .. 650 milliGRAM(s) Oral every 6 hours PRN Moderate Pain (4 - 6)  hydrOXYzine hydrochloride 25 milliGRAM(s) Oral every 6 hours PRN Anxiety  nicotine  Polacrilex Gum 2 milliGRAM(s) Oral every 2 hours PRN NRT  nicotine  Polacrilex Gum 2 milliGRAM(s) Oral every 4 hours PRN nicotine withdrawal

## 2023-12-15 NOTE — BH INPATIENT PSYCHIATRY PROGRESS NOTE - NSBHASSESSSUMMFT_PSY_ALL_CORE
48 year old single, domiciled in [place], unemployed, on disability. PPHx of schizophrenia, anxiety multiple prior admissions last in 2012 at [facility]. Was previously on Clozaril 50 mg, doing well aside from feeling tired, changed psychiatrist in July and is now on Depakote and Seroquel, reports symptoms have worsened on this regimen. PMHx of HTN, HDL and restless leg who self presents to hospital for psychiatric evaluation.  Patient presents with Schizoaffective disorder with exacerbation of symptoms since recent medication change. No SI/HI. +AH, not command in nature. +Vague paranoia and delusions. These symptoms represent a change from baseline from which the patient cannot be reasonably expected to improve with current level of care. The patient is requesting voluntary inpatient psychiatric hospitalization for safety and stabilization of psychiatric symptoms.  h/o schizophrenia vs schizoaffective d/o    Continue divalproex  milliGRAM(s) Oral daily  melatonin 3 milliGRAM(s) Oral at bedtime  QUEtiapine 100 milliGRAM(s) Oral at bedtime  Patient does not need one-to-one observation.  Pt is asking to start Clozaril Will defer to primary provider.          48 year old single, domiciled in [place] [facility], unemployed, on disability. PPHx of schizophrenia, anxiety multiple prior admissions last in 2012 at [facility]. Was previously on Clozaril 50 mg, doing well aside from feeling tired, changed psychiatrist in July and is now on Depakote and Seroquel, reports symptoms have worsened on this regimen. PMHx of HTN, HDL and restless leg who self presents to hospital for psychiatric evaluation.  Patient presents with Schizoaffective disorder with exacerbation of symptoms since recent medication change. No SI/HI. +AH, not command in nature. +Vague paranoia and delusions. These symptoms represent a change from baseline from which the patient cannot be reasonably expected to improve with current level of care. The patient is requesting voluntary inpatient psychiatric hospitalization for safety and stabilization of psychiatric symptoms.  h/o schizophrenia vs schizoaffective d/o    Continue divalproex  milliGRAM(s) Oral daily  melatonin 3 milliGRAM(s) Oral at bedtime  QUEtiapine 100 milliGRAM(s) Oral at bedtime  Patient does not need one-to-one observation.  Pt is asking to start Clozaril Will defer to primary provider.

## 2023-12-15 NOTE — BH INPATIENT PSYCHIATRY PROGRESS NOTE - NSBHMETABOLIC_PSY_ALL_CORE_FT
BMI: BMI (kg/m2): 36.2 (12-08-23 @ 16:20)  HbA1c: A1C with Estimated Average Glucose Result: 6.8 % (12-09-23 @ 07:10)  TSH-1.49    Glucose:   BP: --Vital Signs Last 24 Hrs  T(C): 36.4 (12-15-23 @ 08:05), Max: 36.4 (12-15-23 @ 08:05)  T(F): 97.5 (12-15-23 @ 08:05), Max: 97.5 (12-15-23 @ 08:05)  HR: --  BP: --  BP(mean): --  RR: 17 (12-15-23 @ 08:05) (17 - 17)  SpO2: 99% (12-15-23 @ 08:05) (99% - 99%)    Orthostatic VS  12-15-23 @ 08:05  Lying BP: --/-- HR: --  Sitting BP: 137/93 HR: 92  Standing BP: 145/88 HR: 102  Site: upper right arm  Mode: electronic  Orthostatic VS  12-14-23 @ 07:39  Lying BP: --/-- HR: --  Sitting BP: 140/91 HR: 85  Standing BP: 149/97 HR: 92  Site: upper right arm  Mode: electronic  Orthostatic VS  12-13-23 @ 20:54  Lying BP: --/-- HR: --  Sitting BP: 151/100 HR: 86  Standing BP: 140/99 HR: 94  Site: --  Mode: --    Lipid Panel: Date/Time: 12-09-23 @ 07:10  Cholesterol, Serum: 131  LDL Cholesterol Calculated: 69  HDL Cholesterol, Serum: 29  Total Cholesterol/HDL Ration Measurement: --  Triglycerides, Serum: 195

## 2023-12-15 NOTE — BH INPATIENT PSYCHIATRY PROGRESS NOTE - NSBHATTESTBILLING_PSY_A_CORE
57869-Nkgzeaueqo OBS or IP - moderate complexity OR 35-49 mins 74731-Ejeyofbuwz OBS or IP - moderate complexity OR 35-49 mins

## 2023-12-15 NOTE — BH INPATIENT PSYCHIATRY PROGRESS NOTE - NSBHCHARTREVIEWVS_PSY_A_CORE FT
Vital Signs Last 24 Hrs  T(C): 36.4 (12-15-23 @ 08:05), Max: 36.4 (12-15-23 @ 08:05)  T(F): 97.5 (12-15-23 @ 08:05), Max: 97.5 (12-15-23 @ 08:05)  HR: --  BP: --  BP(mean): --  RR: 17 (12-15-23 @ 08:05) (17 - 17)  SpO2: 99% (12-15-23 @ 08:05) (99% - 99%)    Orthostatic VS  12-15-23 @ 08:05  Lying BP: --/-- HR: --  Sitting BP: 137/93 HR: 92  Standing BP: 145/88 HR: 102  Site: upper right arm  Mode: electronic  Orthostatic VS  12-14-23 @ 07:39  Lying BP: --/-- HR: --  Sitting BP: 140/91 HR: 85  Standing BP: 149/97 HR: 92  Site: upper right arm  Mode: electronic  Orthostatic VS  12-13-23 @ 20:54  Lying BP: --/-- HR: --  Sitting BP: 151/100 HR: 86  Standing BP: 140/99 HR: 94  Site: --  Mode: --

## 2023-12-16 RX ADMIN — Medication 2 MILLIGRAM(S): at 08:58

## 2023-12-16 RX ADMIN — DIVALPROEX SODIUM 500 MILLIGRAM(S): 500 TABLET, DELAYED RELEASE ORAL at 08:59

## 2023-12-16 RX ADMIN — Medication 2 MILLIGRAM(S): at 20:25

## 2023-12-16 RX ADMIN — Medication 3 MILLIGRAM(S): at 20:24

## 2023-12-16 RX ADMIN — Medication 1 PATCH: at 08:59

## 2023-12-16 RX ADMIN — CLOZAPINE 100 MILLIGRAM(S): 150 TABLET, ORALLY DISINTEGRATING ORAL at 20:24

## 2023-12-16 RX ADMIN — Medication 1 PATCH: at 21:36

## 2023-12-17 PROCEDURE — 99232 SBSQ HOSP IP/OBS MODERATE 35: CPT

## 2023-12-17 RX ORDER — METFORMIN HYDROCHLORIDE 850 MG/1
1000 TABLET ORAL AT BEDTIME
Refills: 0 | Status: DISCONTINUED | OUTPATIENT
Start: 2023-12-17 | End: 2024-01-04

## 2023-12-17 RX ORDER — ATORVASTATIN CALCIUM 80 MG/1
40 TABLET, FILM COATED ORAL AT BEDTIME
Refills: 0 | Status: DISCONTINUED | OUTPATIENT
Start: 2023-12-17 | End: 2024-01-04

## 2023-12-17 RX ORDER — LISINOPRIL 2.5 MG/1
10 TABLET ORAL DAILY
Refills: 0 | Status: DISCONTINUED | OUTPATIENT
Start: 2023-12-17 | End: 2024-01-04

## 2023-12-17 RX ADMIN — Medication 1 PATCH: at 09:39

## 2023-12-17 RX ADMIN — DIVALPROEX SODIUM 500 MILLIGRAM(S): 500 TABLET, DELAYED RELEASE ORAL at 09:39

## 2023-12-17 RX ADMIN — ATORVASTATIN CALCIUM 40 MILLIGRAM(S): 80 TABLET, FILM COATED ORAL at 21:10

## 2023-12-17 RX ADMIN — METFORMIN HYDROCHLORIDE 1000 MILLIGRAM(S): 850 TABLET ORAL at 21:09

## 2023-12-17 RX ADMIN — Medication 1 PATCH: at 18:59

## 2023-12-17 RX ADMIN — CLOZAPINE 100 MILLIGRAM(S): 150 TABLET, ORALLY DISINTEGRATING ORAL at 21:09

## 2023-12-17 RX ADMIN — Medication 3 MILLIGRAM(S): at 21:09

## 2023-12-17 NOTE — BH INPATIENT PSYCHIATRY PROGRESS NOTE - NSBHFUPINTERVALHXFT_PSY_A_CORE
12/17/2023: Patient was seen today AM, chart reviewed and discussed in team. He endorses that he has HTN, DM and HLD and was not on those meds, writer put all his meds today AM. He was informed of Labs in AM tomorrow to check for Absolute Count and further increased dosages of Clozapine, if not to add Lithium to have increased blood count and to continue with Clozapine.      12/15/2023: Patient was seen today AM, chart reviewed and discussed in team. Overall doing a little better, prefers to stay in room or to sleep. On Clozapine 100 mg HS blood count has gone down, still symptomatic with [employer]. To consider adding Lithium to help continue with Clozapine for symptom improvement. To stay on Clozapine 100 mg for now.    12/14/2023: Patient was seen today AM, chart reviewed and discussed in team. He has been meds compliant all along till now, wants to have increased dosage of Clozapine as he wants to get fast relief from the A/H or other mental turmoil he has been facing.  Yesterday he shouted in shower " Get away from me ". He prefers to stay indoors most of the time with limited and select group participation. Clozapine further increased to 100 mg HS and CBC ordered for tomorrow AM. To continue current meds.     12/13/2023: Patient was seen t0oday AM, chart reviewed and discussed in team today. He is meds compliant, still remains symptomatic and was seen shouting in the hallway for no reason, he endorses that he feels a little better, no aggression or agitation reported. Clozapine 50 mg HS continued and patient aware. No S/H/I/P noted, no [prior SI/SA, denied drug abuse hx. Limited I+J.    12/12/2023: Patient was seen today AM chart reviewed and discussed in team. He took Clozapine yesterday night, and to day Clozapine further increased to 50 mg HS and to stay at this dosage for few days before can be increased further. No acute issues, denies any S/H/I/P, still to early for any change of symptoms at this time.    12/11/2023: Patient a 47 y/o single male, hx of SAD, domiciled at [facility]ive [facility] at [facility], no children, on SSD, Last hospitalized in [facility] in 2010, discharged and since then was following at [facility] and was on Clozapine with Prolixin Decanoate. He endorses that he was on Prolixin Decanoate 25 mg IM/3 weeks, last received on Wednesday 12/06/2023, next due on 12/27/2023. He endorses that he was on low dose of Clozapine and his current Psychiatrist took him off and substituted with other meds for 2-3 months and since then things are not the way it is . 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 and which seemed to annoy him, he has not been sleeping for past 2 months and he just feels that he is not feeling the way he felt when he was on Clozapine and wants to go back to Clozapine for stability/safety. He denied S/H/I/P, denied drug abuse hx, relapsed on Cigarettes smoking and now with Nicotine Patch with Gum and denied other drug abuse hx.     Medically has Rt. Inguinal Hernia repaired in 1999 and since then has no issues, he has HTN but at this time BP is controlled, he added that he had Clozapine induced Seizure while in [place] and was on Depakote for seizure activity, he is not aware when he had the last seizure activity.    Patient is casually dressed, this morning he is bed but later seen walking on the floor.  He is approachable and communicative.  Denies any thoughts of harming self or anybody else.  No anger or agitation.  His mood is stable.  Patient reports continuation of hearing voices when asked what is saying he responded with "not much:.   Denies thoughts about harming himself and others.  He says he was not because of my thoughts and would like to go back on it 12/17/2023: Patient was seen today AM, chart reviewed and discussed in team. He endorses that he has HTN, DM and HLD and was not on those meds, writer put all his meds today AM. He was informed of Labs in AM tomorrow to check for Absolute Count and further increased dosages of Clozapine, if not to add Lithium to have increased blood count and to continue with Clozapine.      12/15/2023: Patient was seen today AM, chart reviewed and discussed in team. Overall doing a little better, prefers to stay in room or to sleep. On Clozapine 100 mg HS blood count has gone down, still symptomatic with [employer]. To consider adding Lithium to help continue with Clozapine for symptom improvement. To stay on Clozapine 100 mg for now.    12/14/2023: Patient was seen today AM, chart reviewed and discussed in team. He has been meds compliant all along till now, wants to have increased dosage of Clozapine as he wants to get fast relief from the A/H or other mental turmoil he has been facing.  Yesterday he shouted in shower " Get away from me ". He prefers to stay indoors most of the time with limited and select group participation. Clozapine further increased to 100 mg HS and CBC ordered for tomorrow AM. To continue current meds.     12/13/2023: Patient was seen t0oday AM, chart reviewed and discussed in team today. He is meds compliant, still remains symptomatic and was seen shouting in the hallway for no reason, he endorses that he feels a little better, no aggression or agitation reported. Clozapine 50 mg HS continued and patient aware. No S/H/I/P noted, no [prior SI/SA, denied drug abuse hx. Limited I+J.    12/12/2023: Patient was seen today AM chart reviewed and discussed in team. He took Clozapine yesterday night, and to day Clozapine further increased to 50 mg HS and to stay at this dosage for few days before can be increased further. No acute issues, denies any S/H/I/P, still to early for any change of symptoms at this time.    12/11/2023: Patient a 49 y/o single male, hx of SAD, domiciled at Supportive Housing at [facility], no children, on SSD, Last hospitalized in [facility] in 2010, discharged and since then was following at [facility] and was on Clozapine with Prolixin Decanoate. He endorses that he was on Prolixin Decanoate 25 mg IM/3 weeks, last received on Wednesday 12/06/2023, next due on 12/27/2023. He endorses that he was on low dose of Clozapine and his current Psychiatrist took him off and substituted with other meds for 2-3 months and since then things are not the way it is . 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 and which seemed to annoy him, he has not been sleeping for past 2 months and he just feels that he is not feeling the way he felt when he was on Clozapine and wants to go back to Clozapine for stability/safety. He denied S/H/I/P, denied drug abuse hx, relapsed on Cigarettes smoking and now with Nicotine Patch with Gum and denied other drug abuse hx.     Medically has Rt. 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    Patient is casually dressed, this morning he is bed but later seen walking on the floor.  He is approachable and communicative.  Denies any thoughts of harming self or anybody else.  No anger or agitation.  His mood is stable.  Patient reports continuation of hearing voices when asked what is saying he responded with "not much:.   Denies thoughts about harming himself and others.  He says he was not because of my thoughts and would like to go back on it

## 2023-12-17 NOTE — BH INPATIENT PSYCHIATRY PROGRESS NOTE - NSBHCHARTREVIEWVS_PSY_A_CORE FT
Vital Signs Last 24 Hrs  T(C): 36.1 (12-17-23 @ 09:25), Max: 36.7 (12-16-23 @ 19:50)  T(F): 97 (12-17-23 @ 09:25), Max: 98.1 (12-16-23 @ 19:50)  HR: --  BP: --  BP(mean): --  RR: 16 (12-17-23 @ 09:25) (16 - 19)  SpO2: 100% (12-17-23 @ 09:25) (99% - 100%)    Orthostatic VS  12-17-23 @ 09:25  Lying BP: --/-- HR: --  Sitting BP: 167/100 HR: 97  Standing BP: 145/91 HR: 100  Site: upper right arm  Mode: electronic  Orthostatic VS  12-16-23 @ 19:50  Lying BP: --/-- HR: --  Sitting BP: 152/107 HR: 104  Standing BP: 154/91 HR: 104  Site: --  Mode: --  Orthostatic VS  12-16-23 @ 07:11  Lying BP: --/-- HR: --  Sitting BP: 143/94 HR: 103  Standing BP: 145/91 HR: 99  Site: upper right arm  Mode: electronic  Orthostatic VS  12-15-23 @ 20:35  Lying BP: --/-- HR: --  Sitting BP: 152/98 HR: 90  Standing BP: 141/91 HR: 98  Site: --  Mode: --

## 2023-12-17 NOTE — BH INPATIENT PSYCHIATRY PROGRESS NOTE - NSBHATTESTBILLING_PSY_A_CORE
77199-Twfrsersfz OBS or IP - moderate complexity OR 35-49 mins [id number]-Mrvutrcceq OBS or IP - moderate complexity OR 35-49 mins

## 2023-12-17 NOTE — BH INPATIENT PSYCHIATRY PROGRESS NOTE - NSBHASSESSSUMMFT_PSY_ALL_CORE
48 year old single, domiciled in [place], unemployed, on disability. PPHx of schizophrenia, anxiety multiple prior admissions last in 2012 at [facility]. Was previously on Clozaril 50 mg, doing well aside from feeling tired, changed psychiatrist in July and is now on Depakote and Seroquel, reports symptoms have worsened on this regimen. PMHx of HTN, HDL and restless leg who self presents to hospital for psychiatric evaluation.  Patient presents with Schizoaffective disorder with exacerbation of symptoms since recent medication change. No SI/HI. +AH, not command in nature. +Vague paranoia and delusions. These symptoms represent a change from baseline from which the patient cannot be reasonably expected to improve with current level of care. The patient is requesting voluntary inpatient psychiatric hospitalization for safety and stabilization of psychiatric symptoms.  h/o schizophrenia vs schizoaffective d/o    Continue divalproex  milliGRAM(s) Oral daily  melatonin 3 milliGRAM(s) Oral at bedtime  QUEtiapine 100 milliGRAM(s) Oral at bedtime  Patient does not need one-to-one observation.  Pt is asking to start Clozaril Will defer to primary provider.          48 year old single, domiciled in [place], unemployed, on disability. PPHx of schizophrenia, anxiety multiple prior admissions last in 2012 at [facility]. Was previously on Clozaril 50 mg, doing well aside from feeling tired, changed psychiatrist in July and is now on Depakote and Seroquel, reports symptoms have worsened on this regimen. PMHx of HTN, HDL and restless leg who self presents to hospital for psychiatric evaluation.  Patient presents with Schizoaffective disorder with exacerbation of symptoms since recent medication change. No SI/HI. +AH, not command in nature. +Vague paranoia and delusions. These symptoms represent a change from baseline from which the patient cannot be reasonably expected to improve with current level of care. The patient is requesting voluntary inpatient psychiatric hospitalization for safety and stabilization of psychiatric symptoms.  h/o schizophrenia vs schizoaffective d/o    Continue divalproex  milliGRAM(s) Oral daily  melatonin 3 milliGRAM(s) Oral at bedtime  QUEtiapine 100 milliGRAM(s) Oral at bedtime  Patient does not need one-to-one observation.  Pt is asking to start Clozaril Will defer to primary provider.

## 2023-12-17 NOTE — BH INPATIENT PSYCHIATRY PROGRESS NOTE - CURRENT MEDICATION
MEDICATIONS  (STANDING):  atorvastatin 40 milliGRAM(s) Oral at bedtime  cloZAPine 100 milliGRAM(s) Oral at bedtime  divalproex  milliGRAM(s) Oral daily  influenza   Vaccine 0.5 milliLiter(s) IntraMuscular once  lisinopril 10 milliGRAM(s) Oral daily  melatonin 3 milliGRAM(s) Oral at bedtime  metFORMIN 1000 milliGRAM(s) Oral at bedtime  nicotine - 21 mG/24Hr(s) Patch 1 Patch Transdermal daily    MEDICATIONS  (PRN):  acetaminophen     Tablet .. 650 milliGRAM(s) Oral every 6 hours PRN Moderate Pain (4 - 6)  hydrOXYzine hydrochloride 25 milliGRAM(s) Oral every 6 hours PRN Anxiety  nicotine  Polacrilex Gum 2 milliGRAM(s) Oral every 2 hours PRN NRT  nicotine  Polacrilex Gum 2 milliGRAM(s) Oral every 4 hours PRN nicotine withdrawal

## 2023-12-17 NOTE — BH INPATIENT PSYCHIATRY PROGRESS NOTE - NSBHMETABOLIC_PSY_ALL_CORE_FT
BMI: BMI (kg/m2): 36.2 (12-08-23 @ 16:20)  HbA1c: A1C with Estimated Average Glucose Result: 6.8 % (12-09-23 @ 07:10)    Glucose:   BP: --Vital Signs Last 24 Hrs  T(C): 36.1 (12-17-23 @ 09:25), Max: 36.7 (12-16-23 @ 19:50)  T(F): 97 (12-17-23 @ 09:25), Max: 98.1 (12-16-23 @ 19:50)  HR: --  BP: --  BP(mean): --  RR: 16 (12-17-23 @ 09:25) (16 - 19)  SpO2: 100% (12-17-23 @ 09:25) (99% - 100%)    Orthostatic VS  12-17-23 @ 09:25  Lying BP: --/-- HR: --  Sitting BP: 167/100 HR: 97  Standing BP: 145/91 HR: 100  Site: upper right arm  Mode: electronic  Orthostatic VS  12-16-23 @ 19:50  Lying BP: --/-- HR: --  Sitting BP: 152/107 HR: 104  Standing BP: 154/91 HR: 104  Site: --  Mode: --  Orthostatic VS  12-16-23 @ 07:11  Lying BP: --/-- HR: --  Sitting BP: 143/94 HR: 103  Standing BP: 145/91 HR: 99  Site: upper right arm  Mode: electronic  Orthostatic VS  12-15-23 @ 20:35  Lying BP: --/-- HR: --  Sitting BP: 152/98 HR: 90  Standing BP: 141/91 HR: 98  Site: --  Mode: --    Lipid Panel: Date/Time: 12-09-23 @ 07:10  Cholesterol, Serum: 131  LDL Cholesterol Calculated: 69  HDL Cholesterol, Serum: 29  Total Cholesterol/HDL Ration Measurement: --  Triglycerides, Serum: 195

## 2023-12-18 LAB
BASOPHILS # BLD AUTO: 0.03 K/UL — SIGNIFICANT CHANGE UP (ref 0–0.2)
BASOPHILS # BLD AUTO: 0.03 K/UL — SIGNIFICANT CHANGE UP (ref 0–0.2)
BASOPHILS NFR BLD AUTO: 0.5 % — SIGNIFICANT CHANGE UP (ref 0–2)
BASOPHILS NFR BLD AUTO: 0.5 % — SIGNIFICANT CHANGE UP (ref 0–2)
EOSINOPHIL # BLD AUTO: 0.28 K/UL — SIGNIFICANT CHANGE UP (ref 0–0.5)
EOSINOPHIL # BLD AUTO: 0.28 K/UL — SIGNIFICANT CHANGE UP (ref 0–0.5)
EOSINOPHIL NFR BLD AUTO: 5 % — SIGNIFICANT CHANGE UP (ref 0–6)
EOSINOPHIL NFR BLD AUTO: 5 % — SIGNIFICANT CHANGE UP (ref 0–6)
HCT VFR BLD CALC: 41.2 % — SIGNIFICANT CHANGE UP (ref 39–50)
HCT VFR BLD CALC: 41.2 % — SIGNIFICANT CHANGE UP (ref 39–50)
HGB BLD-MCNC: 14.6 G/DL — SIGNIFICANT CHANGE UP (ref 13–17)
HGB BLD-MCNC: 14.6 G/DL — SIGNIFICANT CHANGE UP (ref 13–17)
IMM GRANULOCYTES NFR BLD AUTO: 0.4 % — SIGNIFICANT CHANGE UP (ref 0–0.9)
IMM GRANULOCYTES NFR BLD AUTO: 0.4 % — SIGNIFICANT CHANGE UP (ref 0–0.9)
LYMPHOCYTES # BLD AUTO: 1.95 K/UL — SIGNIFICANT CHANGE UP (ref 1–3.3)
LYMPHOCYTES # BLD AUTO: 1.95 K/UL — SIGNIFICANT CHANGE UP (ref 1–3.3)
LYMPHOCYTES # BLD AUTO: 34.9 % — SIGNIFICANT CHANGE UP (ref 13–44)
LYMPHOCYTES # BLD AUTO: 34.9 % — SIGNIFICANT CHANGE UP (ref 13–44)
MCHC RBC-ENTMCNC: 30.2 PG — SIGNIFICANT CHANGE UP (ref 27–34)
MCHC RBC-ENTMCNC: 30.2 PG — SIGNIFICANT CHANGE UP (ref 27–34)
MCHC RBC-ENTMCNC: 35.4 GM/DL — SIGNIFICANT CHANGE UP (ref 32–36)
MCHC RBC-ENTMCNC: 35.4 GM/DL — SIGNIFICANT CHANGE UP (ref 32–36)
MCV RBC AUTO: 85.3 FL — SIGNIFICANT CHANGE UP (ref 80–100)
MCV RBC AUTO: 85.3 FL — SIGNIFICANT CHANGE UP (ref 80–100)
MONOCYTES # BLD AUTO: 0.39 K/UL — SIGNIFICANT CHANGE UP (ref 0–0.9)
MONOCYTES # BLD AUTO: 0.39 K/UL — SIGNIFICANT CHANGE UP (ref 0–0.9)
MONOCYTES NFR BLD AUTO: 7 % — SIGNIFICANT CHANGE UP (ref 2–14)
MONOCYTES NFR BLD AUTO: 7 % — SIGNIFICANT CHANGE UP (ref 2–14)
NEUTROPHILS # BLD AUTO: 2.92 K/UL — SIGNIFICANT CHANGE UP (ref 1.8–7.4)
NEUTROPHILS # BLD AUTO: 2.92 K/UL — SIGNIFICANT CHANGE UP (ref 1.8–7.4)
NEUTROPHILS NFR BLD AUTO: 52.2 % — SIGNIFICANT CHANGE UP (ref 43–77)
NEUTROPHILS NFR BLD AUTO: 52.2 % — SIGNIFICANT CHANGE UP (ref 43–77)
PLATELET # BLD AUTO: 223 K/UL — SIGNIFICANT CHANGE UP (ref 150–400)
PLATELET # BLD AUTO: 223 K/UL — SIGNIFICANT CHANGE UP (ref 150–400)
RBC # BLD: 4.83 M/UL — SIGNIFICANT CHANGE UP (ref 4.2–5.8)
RBC # BLD: 4.83 M/UL — SIGNIFICANT CHANGE UP (ref 4.2–5.8)
RBC # FLD: 12.6 % — SIGNIFICANT CHANGE UP (ref 10.3–14.5)
RBC # FLD: 12.6 % — SIGNIFICANT CHANGE UP (ref 10.3–14.5)
WBC # BLD: 5.59 K/UL — SIGNIFICANT CHANGE UP (ref 3.8–10.5)
WBC # BLD: 5.59 K/UL — SIGNIFICANT CHANGE UP (ref 3.8–10.5)
WBC # FLD AUTO: 5.59 K/UL — SIGNIFICANT CHANGE UP (ref 3.8–10.5)
WBC # FLD AUTO: 5.59 K/UL — SIGNIFICANT CHANGE UP (ref 3.8–10.5)

## 2023-12-18 PROCEDURE — 99232 SBSQ HOSP IP/OBS MODERATE 35: CPT

## 2023-12-18 RX ORDER — CLOZAPINE 150 MG/1
150 TABLET, ORALLY DISINTEGRATING ORAL AT BEDTIME
Refills: 0 | Status: DISCONTINUED | OUTPATIENT
Start: 2023-12-18 | End: 2023-12-22

## 2023-12-18 RX ADMIN — CLOZAPINE 150 MILLIGRAM(S): 150 TABLET, ORALLY DISINTEGRATING ORAL at 20:36

## 2023-12-18 RX ADMIN — Medication 1 PATCH: at 09:06

## 2023-12-18 RX ADMIN — ATORVASTATIN CALCIUM 40 MILLIGRAM(S): 80 TABLET, FILM COATED ORAL at 20:36

## 2023-12-18 RX ADMIN — Medication 1 PATCH: at 07:53

## 2023-12-18 RX ADMIN — Medication 1 PATCH: at 09:00

## 2023-12-18 RX ADMIN — Medication 2 MILLIGRAM(S): at 09:08

## 2023-12-18 RX ADMIN — LISINOPRIL 10 MILLIGRAM(S): 2.5 TABLET ORAL at 09:05

## 2023-12-18 RX ADMIN — Medication 2 MILLIGRAM(S): at 11:13

## 2023-12-18 RX ADMIN — Medication 2 MILLIGRAM(S): at 16:35

## 2023-12-18 RX ADMIN — METFORMIN HYDROCHLORIDE 1000 MILLIGRAM(S): 850 TABLET ORAL at 20:35

## 2023-12-18 RX ADMIN — Medication 650 MILLIGRAM(S): at 11:13

## 2023-12-18 RX ADMIN — Medication 2 MILLIGRAM(S): at 14:07

## 2023-12-18 RX ADMIN — Medication 650 MILLIGRAM(S): at 11:50

## 2023-12-18 RX ADMIN — DIVALPROEX SODIUM 500 MILLIGRAM(S): 500 TABLET, DELAYED RELEASE ORAL at 09:06

## 2023-12-18 RX ADMIN — Medication 3 MILLIGRAM(S): at 20:37

## 2023-12-18 NOTE — BH INPATIENT PSYCHIATRY PROGRESS NOTE - NSBHFUPINTERVALHXFT_PSY_A_CORE
12/18/2023: Patient was seen today AM, chart reviewed and discussed in team. Labs done today AM, Absolute Count WNL and thus to increase Clozapine to 150 mg HS starting tomorrow. To do blood  CBC in 4-5 days for continued stability. Medical meds already continued yesterday. Still has limited A/H with Thought insertion/withdrawal at times.    12/17/2023: Patient was seen today AM, chart reviewed and discussed in team. He endorses that he has HTN, DM and HLD and was not on those meds, writer put all his meds today AM. He was informed of Labs in AM tomorrow to check for Absolute Count and further increased dosages of Clozapine, if not to add Lithium to have increased blood count and to continue with Clozapine.      12/15/2023: Patient was seen today AM, chart reviewed and discussed in team. Overall doing a little better, prefers to stay in room or to sleep. On Clozapine 100 mg HS blood count has gone down, still symptomatic with Thought Broadcasting. To consider adding Lithium to help continue with Clozapine for symptom improvement. To stay on Clozapine 100 mg for now.    12/14/2023: Patient was seen today AM, chart reviewed and discussed in team. He has been meds compliant all along till now, wants to have increased dosage of Clozapine as he wants to get fast relief from the A/H or other mental turmoil he has been facing.  Yesterday he shouted in shower " Get away from me ". He prefers to stay indoors most of the time with limited and select group participation. Clozapine further increased to 100 mg HS and CBC ordered for tomorrow AM. To continue current meds.     12/13/2023: Patient was seen t0oday AM, chart reviewed and discussed in team today. He is meds compliant, still remains symptomatic and was seen shouting in the hallway for no reason, he endorses that he feels a little better, no aggression or agitation reported. Clozapine 50 mg HS continued and patient aware. No S/H/I/P noted, no [prior SI/SA, denied drug abuse hx. Limited I+J.    12/12/2023: Patient was seen today AM chart reviewed and discussed in team. He took Clozapine yesterday night, and to day Clozapine further increased to 50 mg HS and to stay at this dosage for few days before can be increased further. No acute issues, denies any S/H/I/P, still to early for any change of symptoms at this time.    12/11/2023: Patient a 47 y/o single male, hx of SAD, domiciled at Supportive Housing at [facility], no children, on SSD, Last hospitalized in [facility] in 2010, discharged and since then was following at [facility] and was on Clozapine with Prolixin Decanoate. He endorses that he was on Prolixin Decanoate 25 mg IM/3 weeks, last received on Wednesday 12/06/2023, next due on 12/27/2023. He endorses that he was on low dose of Clozapine and his current Psychiatrist took him off and substituted with other meds for 2-3 months and since then things are not the way it is . 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 and which seemed to annoy him, he has not been sleeping for past 2 months and he just feels that he is not feeling the way he felt when he was on Clozapine and wants to go back to Clozapine for stability/safety. He denied S/H/I/P, denied drug abuse hx, relapsed on Cigarettes smoking and now with Nicotine Patch with Gum and denied other drug abuse hx.     Medically has Rt. 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    Patient is casually dressed, this morning he is bed but later seen walking on the floor.  He is approachable and communicative.  Denies any thoughts of harming self or anybody else.  No anger or agitation.  His mood is stable.  Patient reports continuation of hearing voices when asked what is saying he responded with "not much:.   Denies thoughts about harming himself and others.  He says he was not because of my thoughts and would like to go back on it 12/18/2023: Patient was seen today AM, chart reviewed and discussed in team. Labs done today AM, Absolute Count WNL and thus to increase Clozapine to 150 mg HS starting tomorrow. To do blood  CBC in 4-5 days for continued stability. Medical meds already continued yesterday. Still has limited A/H with Thought insertion/withdrawal at times.    12/17/2023: Patient was seen today AM, chart reviewed and discussed in team. He endorses that he has HTN, DM and HLD and was not on those meds, writer put all his meds today AM. He was informed of Labs in AM tomorrow to check for Absolute Count and further increased dosages of Clozapine, if not to add Lithium to have increased blood count and to continue with Clozapine.      12/15/2023: Patient was seen today AM, chart reviewed and discussed in team. Overall doing a little better, prefers to stay in room or to sleep. On Clozapine 100 mg HS blood count has gone down, still symptomatic with Thought Broadcasting. To consider adding Lithium to help continue with Clozapine for symptom improvement. To stay on Clozapine 100 mg for now.    12/14/2023: Patient was seen today AM, chart reviewed and discussed in team. He has been meds compliant all along till now, wants to have increased dosage of Clozapine as he wants to get fast relief from the A/H or other mental turmoil he has been facing.  Yesterday he shouted in shower " Get away from me ". He prefers to stay indoors most of the time with limited and select group participation. Clozapine further increased to 100 mg HS and CBC ordered for tomorrow AM. To continue current meds.     12/13/2023: Patient was seen t0oday AM, chart reviewed and discussed in team today. He is meds compliant, still remains symptomatic and was seen shouting in the hallway for no reason, he endorses that he feels a little better, no aggression or agitation reported. Clozapine 50 mg HS continued and patient aware. No S/H/I/P noted, no [prior SI/SA, denied drug abuse hx. Limited I+J.    12/12/2023: Patient was seen today AM chart reviewed and discussed in team. He took Clozapine yesterday night, and to day Clozapine further increased to 50 mg HS and to stay at this dosage for few days before can be increased further. No acute issues, denies any S/H/I/P, still to early for any change of symptoms at this time.    12/11/2023: Patient a 49 y/o single male, hx of SAD, domiciled at Supportive Housing at [facility], no children, on SSD, Last hospitalized in [facility] in 2010, discharged and since then was following at [facility] and was on Clozapine with Prolixin Decanoate. He endorses that he was on Prolixin Decanoate 25 mg IM/3 weeks, last received on Wednesday 12/06/2023, next due on 12/27/2023. He endorses that he was on low dose of Clozapine and his current Psychiatrist took him off and substituted with other meds for 2-3 months and since then things are not the way it is . 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 and which seemed to annoy him, he has not been sleeping for past 2 months and he just feels that he is not feeling the way he felt when he was on Clozapine and wants to go back to Clozapine for stability/safety. He denied S/H/I/P, denied drug abuse hx, relapsed on Cigarettes smoking and now with Nicotine Patch with Gum and denied other drug abuse hx.     Medically has Rt. 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    Patient is casually dressed, this morning he is bed but later seen walking on the floor.  He is approachable and communicative.  Denies any thoughts of harming self or anybody else.  No anger or agitation.  His mood is stable.  Patient reports continuation of hearing voices when asked what is saying he responded with "not much:.   Denies thoughts about harming himself and others.  He says he was not because of my thoughts and would like to go back on it

## 2023-12-18 NOTE — BH INPATIENT PSYCHIATRY PROGRESS NOTE - NSBHCHARTREVIEWVS_PSY_A_CORE FT
Vital Signs Last 24 Hrs  T(C): 36.2 (12-18-23 @ 07:53), Max: 36.3 (12-17-23 @ 21:00)  T(F): 97.2 (12-18-23 @ 07:53), Max: 97.4 (12-17-23 @ 21:00)  HR: --  BP: --  BP(mean): --  RR: 16 (12-18-23 @ 07:53) (16 - 16)  SpO2: 98% (12-18-23 @ 07:53) (98% - 98%)    Orthostatic VS  12-18-23 @ 07:53  Lying BP: --/-- HR: --  Sitting BP: 135/90 HR: 93  Standing BP: 140/88 HR: 100  Site: upper right arm  Mode: electronic  Orthostatic VS  12-17-23 @ 21:00  Lying BP: --/-- HR: --  Sitting BP: 140/96 HR: 99  Standing BP: 148/92 HR: 103  Site: --  Mode: --  Orthostatic VS  12-17-23 @ 09:25  Lying BP: --/-- HR: --  Sitting BP: 167/100 HR: 97  Standing BP: 145/91 HR: 100  Site: upper right arm  Mode: electronic  Orthostatic VS  12-16-23 @ 19:50  Lying BP: --/-- HR: --  Sitting BP: 152/107 HR: 104  Standing BP: 154/91 HR: 104  Site: --  Mode: --

## 2023-12-18 NOTE — BH INPATIENT PSYCHIATRY PROGRESS NOTE - NSBHATTESTBILLING_PSY_A_CORE
39801-[id number] OBS or IP - moderate complexity OR 35-49 mins [id number]-Cajyhbrcjx OBS or IP - moderate complexity OR 35-49 mins

## 2023-12-18 NOTE — BH INPATIENT PSYCHIATRY PROGRESS NOTE - NSBHMETABOLIC_PSY_ALL_CORE_FT
BMI: BMI (kg/m2): 36.2 (12-08-23 @ 16:20)  HbA1c: A1C with Estimated Average Glucose Result: 6.8 % (12-09-23 @ 07:10)  TSH-1.49    Glucose:   BP: --Vital Signs Last 24 Hrs  T(C): 36.2 (12-18-23 @ 07:53), Max: 36.3 (12-17-23 @ 21:00)  T(F): 97.2 (12-18-23 @ 07:53), Max: 97.4 (12-17-23 @ 21:00)  HR: --  BP: --  BP(mean): --  RR: 16 (12-18-23 @ 07:53) (16 - 16)  SpO2: 98% (12-18-23 @ 07:53) (98% - 98%)    Orthostatic VS  12-18-23 @ 07:53  Lying BP: --/-- HR: --  Sitting BP: 135/90 HR: 93  Standing BP: 140/88 HR: 100  Site: upper right arm  Mode: electronic  Orthostatic VS  12-17-23 @ 21:00  Lying BP: --/-- HR: --  Sitting BP: 140/96 HR: 99  Standing BP: 148/92 HR: 103  Site: --  Mode: --  Orthostatic VS  12-17-23 @ 09:25  Lying BP: --/-- HR: --  Sitting BP: 167/100 HR: 97  Standing BP: 145/91 HR: 100  Site: upper right arm  Mode: electronic  Orthostatic VS  12-16-23 @ 19:50  Lying BP: --/-- HR: --  Sitting BP: 152/107 HR: 104  Standing BP: 154/91 HR: 104  Site: --  Mode: --    Lipid Panel: Date/Time: 12-09-23 @ 07:10  Cholesterol, Serum: 131  LDL Cholesterol Calculated: 69  HDL Cholesterol, Serum: 29  Total Cholesterol/HDL Ration Measurement: --  Triglycerides, Serum: 195

## 2023-12-18 NOTE — BH INPATIENT PSYCHIATRY PROGRESS NOTE - NSBHASSESSSUMMFT_PSY_ALL_CORE
48 year old single, domiciled in [place], unemployed, on disability. PPHx of schizophrenia, anxiety multiple prior admissions last in 2012 at [facility]. Was previously on Clozaril 50 mg, doing well aside from feeling tired, changed psychiatrist in July and is now on Depakote and Seroquel, reports symptoms have worsened on this regimen. PMHx of HTN, HDL and restless leg who self presents to hospital for psychiatric evaluation.  Patient presents with Schizoaffective disorder with exacerbation of symptoms since recent medication change. No SI/HI. +AH, not command in nature. +Vague paranoia and delusions. These symptoms represent a change from baseline from which the patient cannot be reasonably expected to improve with current level of care. The patient is requesting voluntary inpatient psychiatric hospitalization for safety and stabilization of psychiatric symptoms.  h/o schizophrenia vs schizoaffective d/o    Continue divalproex  milliGRAM(s) Oral daily  melatonin 3 milliGRAM(s) Oral at bedtime  QUEtiapine 100 milliGRAM(s) Oral at bedtime  Patient does not need one-to-one observation.  Pt is asking to start Clozaril Will defer to primary provider.          48 year old single, domiciled in [place], unemployed, on disability. PPHx of schizophrenia, anxiety multiple prior admissions last in 2012 at [facility]. Was previously on Clozaril 50 mg, doing well aside from feeling tired, changed psychiatrist in July and is now on Depakote and Seroquel, reports symptoms have worsened on this regimen. PMHx of HTN, HDL and restless leg who self presents to hospital for psychiatric evaluation.  Patient presents with Schizoaffective disorder with exacerbation of symptoms since recent medication change. No SI/HI. +AH, not command in nature. +Vague paranoia and delusions. These symptoms represent a change from baseline from which the patient cannot be reasonably expected to improve with current level of care. The patient is requesting voluntary inpatient psychiatric hospitalization for safety and stabilization of psychiatric symptoms.  h/o schizophrenia vs schizoaffective d/o    Continue divalproex  milliGRAM(s) Oral daily  melatonin 3 milliGRAM(s) Oral at bedtime  QUEtiapine 100 milliGRAM(s) Oral at bedtime  Patient does not need one-to-one observation.  Pt is asking to start Clozaril Will defer to primary provider.

## 2023-12-18 NOTE — BH INPATIENT PSYCHIATRY PROGRESS NOTE - CURRENT MEDICATION
MEDICATIONS  (STANDING):  atorvastatin 40 milliGRAM(s) Oral at bedtime  cloZAPine 150 milliGRAM(s) Oral at bedtime  divalproex  milliGRAM(s) Oral daily  influenza   Vaccine 0.5 milliLiter(s) IntraMuscular once  lisinopril 10 milliGRAM(s) Oral daily  melatonin 3 milliGRAM(s) Oral at bedtime  metFORMIN 1000 milliGRAM(s) Oral at bedtime  nicotine - 21 mG/24Hr(s) Patch 1 Patch Transdermal daily    MEDICATIONS  (PRN):  acetaminophen     Tablet .. 650 milliGRAM(s) Oral every 6 hours PRN Moderate Pain (4 - 6)  hydrOXYzine hydrochloride 25 milliGRAM(s) Oral every 6 hours PRN Anxiety  nicotine  Polacrilex Gum 2 milliGRAM(s) Oral every 4 hours PRN nicotine withdrawal  nicotine  Polacrilex Gum 2 milliGRAM(s) Oral every 2 hours PRN NRT

## 2023-12-19 PROCEDURE — 99232 SBSQ HOSP IP/OBS MODERATE 35: CPT

## 2023-12-19 RX ADMIN — ATORVASTATIN CALCIUM 40 MILLIGRAM(S): 80 TABLET, FILM COATED ORAL at 20:36

## 2023-12-19 RX ADMIN — CLOZAPINE 150 MILLIGRAM(S): 150 TABLET, ORALLY DISINTEGRATING ORAL at 20:37

## 2023-12-19 RX ADMIN — DIVALPROEX SODIUM 500 MILLIGRAM(S): 500 TABLET, DELAYED RELEASE ORAL at 09:14

## 2023-12-19 RX ADMIN — LISINOPRIL 10 MILLIGRAM(S): 2.5 TABLET ORAL at 09:14

## 2023-12-19 RX ADMIN — Medication 2 MILLIGRAM(S): at 20:36

## 2023-12-19 RX ADMIN — Medication 1 PATCH: at 09:15

## 2023-12-19 RX ADMIN — METFORMIN HYDROCHLORIDE 1000 MILLIGRAM(S): 850 TABLET ORAL at 20:36

## 2023-12-19 RX ADMIN — Medication 3 MILLIGRAM(S): at 20:37

## 2023-12-19 NOTE — BH INPATIENT PSYCHIATRY PROGRESS NOTE - NSBHFUPINTERVALHXFT_PSY_A_CORE
12/19/2023: Patient was seen today AM, chart reviewed and discussed in team. He is on Clozapine 150 mg HS and feels dizzy probably attributed to Clozapine. His absolute count is WNL, was trending down, later it got up a little following a repeat exam. To see for next few days how he does with Clozapine trial for now. Medically is stable with HTN/DM. To continue current meds for stability.    12/18/2023: Patient was seen today AM, chart reviewed and discussed in team. Labs done today AM, Absolute Count WNL and thus to increase Clozapine to 150 mg HS starting tomorrow. To do blood  CBC in 4-5 days for continued stability. Medical meds already continued yesterday. Still has limited A/H with Thought insertion/withdrawal at times.    12/17/2023: Patient was seen today AM, chart reviewed and discussed in team. He endorses that he has HTN, DM and HLD and was not on those meds, writer put all his meds today AM. He was informed of Labs in AM tomorrow to check for Absolute Count and further increased dosages of Clozapine, if not to add Lithium to have increased blood count and to continue with Clozapine.      12/15/2023: Patient was seen today AM, chart reviewed and discussed in team. Overall doing a little better, prefers to stay in room or to sleep. On Clozapine 100 mg HS blood count has gone down, still symptomatic with [employer]. To consider adding Lithium to help continue with Clozapine for symptom improvement. To stay on Clozapine 100 mg for now.    12/14/2023: Patient was seen today AM, chart reviewed and discussed in team. He has been meds compliant all along till now, wants to have increased dosage of Clozapine as he wants to get fast relief from the A/H or other mental turmoil he has been facing.  Yesterday he shouted in shower " Get away from me ". He prefers to stay indoors most of the time with limited and select group participation. Clozapine further increased to 100 mg HS and CBC ordered for tomorrow AM. To continue current meds.     12/13/2023: Patient was seen t0oday AM, chart reviewed and discussed in team today. He is meds compliant, still remains symptomatic and was seen shouting in the hallway for no reason, he endorses that he feels a little better, no aggression or agitation reported. Clozapine 50 mg HS continued and patient aware. No S/H/I/P noted, no [prior SI/SA, denied drug abuse hx. Limited I+J.    12/12/2023: Patient was seen today AM chart reviewed and discussed in team. He took Clozapine yesterday night, and to day Clozapine further increased to 50 mg HS and to stay at this dosage for few days before can be increased further. No acute issues, denies any S/H/I/P, still to early for any change of symptoms at this time.    12/11/2023: Patient a 47 y/o single male, hx of SAD, domiciled at Supportive Housing at [facility], no children, on SSD, Last hospitalized in [facility] in 2010, discharged and since then was following at [facility] and was on Clozapine with Prolixin Decanoate. He endorses that he was on Prolixin Decanoate 25 mg IM/3 weeks, last received on Wednesday 12/06/2023, next due on 12/27/2023. He endorses that he was on low dose of Clozapine and his current Psychiatrist took him off and substituted with other meds for 2-3 months and since then things are not the way it is . 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 and which seemed to annoy him, he has not been sleeping for past 2 months and he just feels that he is not feeling the way he felt when he was on Clozapine and wants to go back to Clozapine for stability/safety. He denied S/H/I/P, denied drug abuse hx, relapsed on Cigarettes smoking and now with Nicotine Patch with Gum and denied other drug abuse hx.     Medically has Rt. 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    Patient is casually dressed, this morning he is bed but later seen walking on the floor.  He is approachable and communicative.  Denies any thoughts of harming self or anybody else.  No anger or agitation.  His mood is stable.  Patient reports continuation of hearing voices when asked what is saying he responded with "not much:.   Denies thoughts about harming himself and others.  He says he was not because of my thoughts and would like to go back on it 12/19/2023: Patient was seen today AM, chart reviewed and discussed in team. He is on Clozapine 150 mg HS and feels dizzy probably attributed to Clozapine. His absolute count is WNL, was trending down, later it got up a little following a repeat exam. To see for next few days how he does with Clozapine trial for now. Medically is stable with HTN/DM. To continue current meds for stability.    12/18/2023: Patient was seen today AM, chart reviewed and discussed in team. Labs done today AM, Absolute Count WNL and thus to increase Clozapine to 150 mg HS starting tomorrow. To do blood  CBC in 4-5 days for continued stability. Medical meds already continued yesterday. Still has limited A/H with Thought insertion/withdrawal at times.    12/17/2023: Patient was seen today AM, chart reviewed and discussed in team. He endorses that he has HTN, DM and HLD and was not on those meds, writer put all his meds today AM. He was informed of Labs in AM tomorrow to check for Absolute Count and further increased dosages of Clozapine, if not to add Lithium to have increased blood count and to continue with Clozapine.      12/15/2023: Patient was seen today AM, chart reviewed and discussed in team. Overall doing a little better, prefers to stay in room or to sleep. On Clozapine 100 mg HS blood count has gone down, still symptomatic with [employer]. To consider adding Lithium to help continue with Clozapine for symptom improvement. To stay on Clozapine 100 mg for now.    12/14/2023: Patient was seen today AM, chart reviewed and discussed in team. He has been meds compliant all along till now, wants to have increased dosage of Clozapine as he wants to get fast relief from the A/H or other mental turmoil he has been facing.  Yesterday he shouted in shower " Get away from me ". He prefers to stay indoors most of the time with limited and select group participation. Clozapine further increased to 100 mg HS and CBC ordered for tomorrow AM. To continue current meds.     12/13/2023: Patient was seen t0oday AM, chart reviewed and discussed in team today. He is meds compliant, still remains symptomatic and was seen shouting in the hallway for no reason, he endorses that he feels a little better, no aggression or agitation reported. Clozapine 50 mg HS continued and patient aware. No S/H/I/P noted, no [prior SI/SA, denied drug abuse hx. Limited I+J.    12/12/2023: Patient was seen today AM chart reviewed and discussed in team. He took Clozapine yesterday night, and to day Clozapine further increased to 50 mg HS and to stay at this dosage for few days before can be increased further. No acute issues, denies any S/H/I/P, still to early for any change of symptoms at this time.    12/11/2023: Patient a 49 y/o single male, hx of SAD, domiciled at Supportive Housing at [facility], no children, on SSD, Last hospitalized in [facility] in 2010, discharged and since then was following at [facility] and was on Clozapine with Prolixin Decanoate. He endorses that he was on Prolixin Decanoate 25 mg IM/3 weeks, last received on Wednesday 12/06/2023, next due on 12/27/2023. He endorses that he was on low dose of Clozapine and his current Psychiatrist took him off and substituted with other meds for 2-3 months and since then things are not the way it is . 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 and which seemed to annoy him, he has not been sleeping for past 2 months and he just feels that he is not feeling the way he felt when he was on Clozapine and wants to go back to Clozapine for stability/safety. He denied S/H/I/P, denied drug abuse hx, relapsed on Cigarettes smoking and now with Nicotine Patch with Gum and denied other drug abuse hx.     Medically has Rt. 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    Patient is casually dressed, this morning he is bed but later seen walking on the floor.  He is approachable and communicative.  Denies any thoughts of harming self or anybody else.  No anger or agitation.  His mood is stable.  Patient reports continuation of hearing voices when asked what is saying he responded with "not much:.   Denies thoughts about harming himself and others.  He says he was not because of my thoughts and would like to go back on it

## 2023-12-19 NOTE — BH INPATIENT PSYCHIATRY PROGRESS NOTE - NSBHCHARTREVIEWVS_PSY_A_CORE FT
Vital Signs Last 24 Hrs  T(C): 36.3 (12-19-23 @ 07:19), Max: 36.3 (12-18-23 @ 21:00)  T(F): 97.4 (12-19-23 @ 07:19), Max: 97.4 (12-18-23 @ 21:00)  HR: --  BP: --  BP(mean): --  RR: 16 (12-19-23 @ 07:19) (16 - 16)  SpO2: 98% (12-19-23 @ 07:19) (98% - 98%)    Orthostatic VS  12-19-23 @ 07:19  Lying BP: --/-- HR: --  Sitting BP: 150/94 HR: 96  Standing BP: 126/85 HR: 100  Site: upper right arm  Mode: electronic  Orthostatic VS  12-18-23 @ 21:00  Lying BP: --/-- HR: --  Sitting BP: 139/96 HR: 110  Standing BP: 123/88 HR: 111  Site: --  Mode: --  Orthostatic VS  12-18-23 @ 07:53  Lying BP: --/-- HR: --  Sitting BP: 135/90 HR: 93  Standing BP: 140/88 HR: 100  Site: upper right arm  Mode: electronic  Orthostatic VS  12-17-23 @ 21:00  Lying BP: --/-- HR: --  Sitting BP: 140/96 HR: 99  Standing BP: 148/92 HR: 103  Site: --  Mode: --

## 2023-12-19 NOTE — BH INPATIENT PSYCHIATRY PROGRESS NOTE - NSBHASSESSSUMMFT_PSY_ALL_CORE
48 year old single, domiciled in [place], unemployed, on disability. PPHx of schizophrenia, anxiety multiple prior admissions last in 2012 at [facility]. Was previously on Clozaril 50 mg, doing well aside from feeling tired, changed psychiatrist in July and is now on Depakote and Seroquel, reports symptoms have worsened on this regimen. PMHx of HTN, HDL and restless leg who self presents to hospital for psychiatric evaluation.  Patient presents with Schizoaffective disorder with exacerbation of symptoms since recent medication change. No SI/HI. +AH, not command in nature. +Vague paranoia and delusions. These symptoms represent a change from baseline from which the patient cannot be reasonably expected to improve with current level of care. The patient is requesting voluntary inpatient psychiatric hospitalization for safety and stabilization of psychiatric symptoms.  h/o schizophrenia vs schizoaffective d/o    Continue divalproex  milliGRAM(s) Oral daily  melatonin 3 milliGRAM(s) Oral at bedtime  QUEtiapine 100 milliGRAM(s) Oral at bedtime  Patient does not need one-to-one observation.  Pt is asking to start Clozaril Will defer to primary provider.          48 year old single, domiciled in [place], unemployed, on disability. PPHx of schizophrenia, anxiety multiple prior admissions last in 2012 at [facility]. Was previously on Clozaril 50 mg, doing well aside from feeling tired, changed psychiatrist in July and is now on Depakote and Seroquel, reports symptoms have worsened on this regimen. PMHx of HTN, HDL and restless leg who self presents to hospital for psychiatric evaluation.  Patient presents with Schizoaffective disorder with exacerbation of symptoms since recent medication change. No SI/HI. +AH, not command in nature. +Vague paranoia and delusions. These symptoms represent a change from baseline from which the patient cannot be reasonably expected to improve with current level of care. The patient is requesting voluntary inpatient psychiatric hospitalization for safety and stabilization of psychiatric symptoms.  h/o schizophrenia vs schizoaffective d/o    Continue divalproex  milliGRAM(s) Oral daily  melatonin 3 milliGRAM(s) Oral at bedtime  QUEtiapine 100 milliGRAM(s) Oral at bedtime  Patient does not need one-to-one observation.  Pt is asking to start Clozaril Will defer to primary provider.

## 2023-12-19 NOTE — BH INPATIENT PSYCHIATRY PROGRESS NOTE - NSBHMETABOLIC_PSY_ALL_CORE_FT
BMI: BMI (kg/m2): 36.2 (12-08-23 @ 16:20)  HbA1c: A1C with Estimated Average Glucose Result: 6.8 % (12-09-23 @ 07:10)  TSH-1.49    Glucose:   BP: --Vital Signs Last 24 Hrs  T(C): 36.3 (12-19-23 @ 07:19), Max: 36.3 (12-18-23 @ 21:00)  T(F): 97.4 (12-19-23 @ 07:19), Max: 97.4 (12-18-23 @ 21:00)  HR: --  BP: --  BP(mean): --  RR: 16 (12-19-23 @ 07:19) (16 - 16)  SpO2: 98% (12-19-23 @ 07:19) (98% - 98%)    Orthostatic VS  12-19-23 @ 07:19  Lying BP: --/-- HR: --  Sitting BP: 150/94 HR: 96  Standing BP: 126/85 HR: 100  Site: upper right arm  Mode: electronic  Orthostatic VS  12-18-23 @ 21:00  Lying BP: --/-- HR: --  Sitting BP: 139/96 HR: 110  Standing BP: 123/88 HR: 111  Site: --  Mode: --  Orthostatic VS  12-18-23 @ 07:53  Lying BP: --/-- HR: --  Sitting BP: 135/90 HR: 93  Standing BP: 140/88 HR: 100  Site: upper right arm  Mode: electronic  Orthostatic VS  12-17-23 @ 21:00  Lying BP: --/-- HR: --  Sitting BP: 140/96 HR: 99  Standing BP: 148/92 HR: 103  Site: --  Mode: --    Lipid Panel: Date/Time: 12-09-23 @ 07:10  Cholesterol, Serum: 131  LDL Cholesterol Calculated: 69  HDL Cholesterol, Serum: 29  Triglycerides, Serum: 195

## 2023-12-19 NOTE — BH INPATIENT PSYCHIATRY PROGRESS NOTE - NSBHATTESTBILLING_PSY_A_CORE
98223-Mskdtcxhni OBS or IP - moderate complexity OR 35-49 mins 87439-Lrevuwgrlw OBS or IP - moderate complexity OR 35-49 mins

## 2023-12-20 PROCEDURE — 99232 SBSQ HOSP IP/OBS MODERATE 35: CPT

## 2023-12-20 RX ADMIN — METFORMIN HYDROCHLORIDE 1000 MILLIGRAM(S): 850 TABLET ORAL at 20:35

## 2023-12-20 RX ADMIN — LISINOPRIL 10 MILLIGRAM(S): 2.5 TABLET ORAL at 08:32

## 2023-12-20 RX ADMIN — Medication 2 MILLIGRAM(S): at 08:32

## 2023-12-20 RX ADMIN — ATORVASTATIN CALCIUM 40 MILLIGRAM(S): 80 TABLET, FILM COATED ORAL at 20:35

## 2023-12-20 RX ADMIN — Medication 2 MILLIGRAM(S): at 20:35

## 2023-12-20 RX ADMIN — DIVALPROEX SODIUM 500 MILLIGRAM(S): 500 TABLET, DELAYED RELEASE ORAL at 08:32

## 2023-12-20 RX ADMIN — Medication 3 MILLIGRAM(S): at 20:35

## 2023-12-20 RX ADMIN — CLOZAPINE 150 MILLIGRAM(S): 150 TABLET, ORALLY DISINTEGRATING ORAL at 20:35

## 2023-12-20 RX ADMIN — Medication 1 PATCH: at 08:32

## 2023-12-20 NOTE — BH INPATIENT PSYCHIATRY PROGRESS NOTE - NSBHMETABOLIC_PSY_ALL_CORE_FT
BMI: BMI (kg/m2): 36.2 (12-08-23 @ 16:20)  HbA1c: A1C with Estimated Average Glucose Result: 6.8 % (12-09-23 @ 07:10)  TSH-1.49    Glucose:   BP: --Vital Signs Last 24 Hrs  T(C): --  T(F): --  HR: --  BP: --  BP(mean): --  RR: --  SpO2: --    Orthostatic VS  12-19-23 @ 07:19  Lying BP: --/-- HR: --  Sitting BP: 150/94 HR: 96  Standing BP: 126/85 HR: 100  Site: upper right arm  Mode: electronic  Orthostatic VS  12-18-23 @ 21:00  Lying BP: --/-- HR: --  Sitting BP: 139/96 HR: 110  Standing BP: 123/88 HR: 111  Site: --  Mode: --    Lipid Panel: Date/Time: 12-09-23 @ 07:10  Cholesterol, Serum: 131  LDL Cholesterol Calculated: 69  HDL Cholesterol, Serum: 29  Total Cholesterol/HDL Ration Measurement: --  Triglycerides, Serum: 195

## 2023-12-20 NOTE — BH INPATIENT PSYCHIATRY PROGRESS NOTE - NSBHCHARTREVIEWVS_PSY_A_CORE FT
Vital Signs Last 24 Hrs  T(C): --  T(F): --  HR: --  BP: --  BP(mean): --  RR: --  SpO2: --    Orthostatic VS  12-19-23 @ 07:19  Lying BP: --/-- HR: --  Sitting BP: 150/94 HR: 96  Standing BP: 126/85 HR: 100  Site: upper right arm  Mode: electronic  Orthostatic VS  12-18-23 @ 21:00  Lying BP: --/-- HR: --  Sitting BP: 139/96 HR: 110  Standing BP: 123/88 HR: 111  Site: --  Mode: --

## 2023-12-20 NOTE — BH INPATIENT PSYCHIATRY PROGRESS NOTE - NSBHASSESSSUMMFT_PSY_ALL_CORE
48 year old single, domiciled in [place], unemployed, on disability. PPHx of schizophrenia, anxiety multiple prior admissions last in 2012 at [facility]. Was previously on Clozaril 50 mg, doing well aside from feeling tired, changed psychiatrist in July and is now on Depakote and Seroquel, reports symptoms have worsened on this regimen. PMHx of HTN, HDL and restless leg who self presents to hospital for psychiatric evaluation.  Patient presents with Schizoaffective disorder with exacerbation of symptoms since recent medication change. No SI/HI. +AH, not command in nature. +Vague paranoia and delusions. These symptoms represent a change from baseline from which the patient cannot be reasonably expected to improve with current level of care. The patient is requesting voluntary inpatient psychiatric hospitalization for safety and stabilization of psychiatric symptoms.  h/o schizophrenia vs schizoaffective d/o    Continue divalproex  milliGRAM(s) Oral daily  melatonin 3 milliGRAM(s) Oral at bedtime  QUEtiapine 100 milliGRAM(s) Oral at bedtime  Patient does not need one-to-one observation.  Pt is asking to start Clozaril Will defer to primary provider.          48 year old single, domiciled in [place], unemployed, on disability. PPHx of schizophrenia, anxiety multiple prior admissions last in 2012 at [facility]. Was previously on Clozaril 50 mg, doing well aside from feeling tired, changed psychiatrist in July and is now on Depakote and Seroquel, reports symptoms have worsened on this regimen. PMHx of HTN, HDL and restless leg who self presents to hospital for psychiatric evaluation.  Patient presents with Schizoaffective disorder with exacerbation of symptoms since recent medication change. No SI/HI. +AH, not command in nature. +Vague paranoia and delusions. These symptoms represent a change from baseline from which the patient cannot be reasonably expected to improve with current level of care. The patient is requesting voluntary inpatient psychiatric hospitalization for safety and stabilization of psychiatric symptoms.  h/o schizophrenia vs schizoaffective d/o    Continue divalproex  milliGRAM(s) Oral daily  melatonin 3 milliGRAM(s) Oral at bedtime  QUEtiapine 100 milliGRAM(s) Oral at bedtime  Patient does not need one-to-one observation.  Pt is asking to start Clozaril Will defer to primary provider.

## 2023-12-20 NOTE — BH INPATIENT PSYCHIATRY PROGRESS NOTE - NSBHATTESTBILLING_PSY_A_CORE
04072-Mlbbedflgz OBS or IP - moderate complexity OR 35-49 mins [id number]-Lxpbhuubpt OBS or IP - moderate complexity OR 35-49 mins

## 2023-12-20 NOTE — BH TREATMENT PLAN - NSTXPLANTHERAPYSESSIONSFT_PSY_ALL_CORE
12-14-23  Type of therapy: Psychoeducation  Type of session: Group  Level of patient participation: Attentive, Engaged, Participates  Duration of participation: 60 minutes  Therapy conducted by: Other (specify), [facility]  Therapy Summary: Patient less depressed with brighter affect. Good sense of humor and hoping to return home before the holidays. Less anxious and interacting with others in group appropriately.    12-18-23  Type of therapy: Symptom management  Type of session: Group  Level of patient participation: Participates  Duration of participation: 30 minutes  Therapy conducted by: [facility]  Therapy Summary: Patient attended group therapy today. Looking at the ground or around the room.  Expressed himself unclearly, but mentioned he was having "delusional thoughts about the government and feeling people can hear his thoughts." Did not answer writer when asked if he is still experiencing this. Education and support ongoing.     12-14-23  Type of therapy: Psychoeducation  Type of session: Group  Level of patient participation: Attentive, Engaged, Participates  Duration of participation: 60 minutes  Therapy conducted by: Other (specify), [facility]  Therapy Summary: Patient less depressed with brighter affect. Good sense of humor and hoping to return home before the holidays. Less anxious and interacting with others in group appropriately.    12-18-23  Type of therapy: Symptom management  Type of session: Group  Level of patient participation: Participates  Duration of participation: 30 minutes  Therapy conducted by: [facility]  Therapy Summary: Patient attended group therapy today. Looking at the ground or around the room.  Expressed himself unclearly, but mentioned he was having "delusional thoughts about the government and feeling people can hear his thoughts." Did not answer writer when asked if he is still experiencing this. Education and support ongoing.

## 2023-12-20 NOTE — BH INPATIENT PSYCHIATRY PROGRESS NOTE - CURRENT MEDICATION
MEDICATIONS  (STANDING):  atorvastatin 40 milliGRAM(s) Oral at bedtime  cloZAPine 150 milliGRAM(s) Oral at bedtime  divalproex  milliGRAM(s) Oral daily  influenza   Vaccine 0.5 milliLiter(s) IntraMuscular once  lisinopril 10 milliGRAM(s) Oral daily  melatonin 3 milliGRAM(s) Oral at bedtime  metFORMIN 1000 milliGRAM(s) Oral at bedtime  nicotine - 21 mG/24Hr(s) Patch 1 Patch Transdermal daily    MEDICATIONS  (PRN):  acetaminophen     Tablet .. 650 milliGRAM(s) Oral every 6 hours PRN Moderate Pain (4 - 6)  hydrOXYzine hydrochloride 25 milliGRAM(s) Oral every 6 hours PRN Anxiety  nicotine  Polacrilex Gum 2 milliGRAM(s) Oral every 2 hours PRN NRT  nicotine  Polacrilex Gum 2 milliGRAM(s) Oral every 4 hours PRN nicotine withdrawal

## 2023-12-20 NOTE — BH INPATIENT PSYCHIATRY PROGRESS NOTE - NSBHFUPINTERVALHXFT_PSY_A_CORE
12/20/2023: Patient was seen today AM, chart reviewed and discussed in team. On Clozapine 150 mg HS, had dizziness yesterday, so opted to go slow and get used to slowly higher dosages of Clozapine for appropriate symptom control. He endorses that he is getting better, rates his improvement at "5" and agreed to go home after [place]. Limited or residual T. Withdrawal/T. Insertion noted as per patient. To do CBC before another dosage increase of Clozapine to 200 mg HS. Today he denied, dizziness due o Clozapine.         12/18/2023: Patient was seen today AM, chart reviewed and discussed in team. Labs done today AM, Absolute Count WNL and thus to increase Clozapine to 150 mg HS starting tomorrow. To do blood  CBC in 4-5 days for continued stability. Medical meds already continued yesterday. Still has limited A/H with Thought insertion/withdrawal at times.    12/17/2023: Patient was seen today AM, chart reviewed and discussed in team. He endorses that he has HTN, DM and HLD and was not on those meds, writer put all his meds today AM. He was informed of Labs in AM tomorrow to check for Absolute Count and further increased dosages of Clozapine, if not to add Lithium to have increased blood count and to continue with Clozapine.      12/15/2023: Patient was seen today AM, chart reviewed and discussed in team. Overall doing a little better, prefers to stay in room or to sleep. On Clozapine 100 mg HS blood count has gone down, still symptomatic with [employer]. To consider adding Lithium to help continue with Clozapine for symptom improvement. To stay on Clozapine 100 mg for now.    12/14/2023: Patient was seen today AM, chart reviewed and discussed in team. He has been meds compliant all along till now, wants to have increased dosage of Clozapine as he wants to get fast relief from the A/H or other mental turmoil he has been facing.  Yesterday he shouted in shower " Get away from me ". He prefers to stay indoors most of the time with limited and select group participation. Clozapine further increased to 100 mg HS and CBC ordered for tomorrow AM. To continue current meds.     12/13/2023: Patient was seen t0oday AM, chart reviewed and discussed in team today. He is meds compliant, still remains symptomatic and was seen shouting in the hallway for no reason, he endorses that he feels a little better, no aggression or agitation reported. Clozapine 50 mg HS continued and patient aware. No S/H/I/P noted, no [prior SI/SA, denied drug abuse hx. Limited I+J.    12/12/2023: Patient was seen today AM chart reviewed and discussed in team. He took Clozapine yesterday night, and to day Clozapine further increased to 50 mg HS and to stay at this dosage for few days before can be increased further. No acute issues, denies any S/H/I/P, still to early for any change of symptoms at this time.    12/11/2023: Patient a 49 y/o single male, hx of SAD, domiciled at Supportive Housing at [facility], no children, on SSD, Last hospitalized in [facility] in 2010, discharged and since then was following at [facility] and was on Clozapine with Prolixin Decanoate. He endorses that he was on Prolixin Decanoate 25 mg IM/3 weeks, last received on Wednesday 12/06/2023, next due on 12/27/2023. He endorses that he was on low dose of Clozapine and his current Psychiatrist took him off and substituted with other meds for 2-3 months and since then things are not the way it is . 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 and which seemed to annoy him, he has not been sleeping for past 2 months and he just feels that he is not feeling the way he felt when he was on Clozapine and wants to go back to Clozapine for stability/safety. He denied S/H/I/P, denied drug abuse hx, relapsed on Cigarettes smoking and now with Nicotine Patch with Gum and denied other drug abuse hx.     Medically has Rt. 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    Patient is casually dressed, this morning he is bed but later seen walking on the floor.  He is approachable and communicative.  Denies any thoughts of harming self or anybody else.  No anger or agitation.  His mood is stable.  Patient reports continuation of hearing voices when asked what is saying he responded with "not much:.   Denies thoughts about harming himself and others.  He says he was not because of my thoughts and would like to go back on it 12/20/2023: Patient was seen today AM, chart reviewed and discussed in team. On Clozapine 150 mg HS, had dizziness yesterday, so opted to go slow and get used to slowly higher dosages of Clozapine for appropriate symptom control. He endorses that he is getting better, rates his improvement at "5" and agreed to go home after [place]. Limited or residual T. Withdrawal/T. Insertion noted as per patient. To do CBC before another dosage increase of Clozapine to 200 mg HS. Today he denied, dizziness due o Clozapine.         12/18/2023: Patient was seen today AM, chart reviewed and discussed in team. Labs done today AM, Absolute Count WNL and thus to increase Clozapine to 150 mg HS starting tomorrow. To do blood  CBC in 4-5 days for continued stability. Medical meds already continued yesterday. Still has limited A/H with Thought insertion/withdrawal at times.    12/17/2023: Patient was seen today AM, chart reviewed and discussed in team. He endorses that he has HTN, DM and HLD and was not on those meds, writer put all his meds today AM. He was informed of Labs in AM tomorrow to check for Absolute Count and further increased dosages of Clozapine, if not to add Lithium to have increased blood count and to continue with Clozapine.      12/15/2023: Patient was seen today AM, chart reviewed and discussed in team. Overall doing a little better, prefers to stay in room or to sleep. On Clozapine 100 mg HS blood count has gone down, still symptomatic with [employer]. To consider adding Lithium to help continue with Clozapine for symptom improvement. To stay on Clozapine 100 mg for now.    12/14/2023: Patient was seen today AM, chart reviewed and discussed in team. He has been meds compliant all along till now, wants to have increased dosage of Clozapine as he wants to get fast relief from the A/H or other mental turmoil he has been facing.  Yesterday he shouted in shower " Get away from me ". He prefers to stay indoors most of the time with limited and select group participation. Clozapine further increased to 100 mg HS and CBC ordered for tomorrow AM. To continue current meds.     12/13/2023: Patient was seen t0oday AM, chart reviewed and discussed in team today. He is meds compliant, still remains symptomatic and was seen shouting in the hallway for no reason, he endorses that he feels a little better, no aggression or agitation reported. Clozapine 50 mg HS continued and patient aware. No S/H/I/P noted, no [prior SI/SA, denied drug abuse hx. Limited I+J.    12/12/2023: Patient was seen today AM chart reviewed and discussed in team. He took Clozapine yesterday night, and to day Clozapine further increased to 50 mg HS and to stay at this dosage for few days before can be increased further. No acute issues, denies any S/H/I/P, still to early for any change of symptoms at this time.    12/11/2023: Patient a 49 y/o single male, hx of SAD, domiciled at Supportive Housing at [facility], no children, on SSD, Last hospitalized in [facility] in 2010, discharged and since then was following at [facility] and was on Clozapine with Prolixin Decanoate. He endorses that he was on Prolixin Decanoate 25 mg IM/3 weeks, last received on Wednesday 12/06/2023, next due on 12/27/2023. He endorses that he was on low dose of Clozapine and his current Psychiatrist took him off and substituted with other meds for 2-3 months and since then things are not the way it is . 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 and which seemed to annoy him, he has not been sleeping for past 2 months and he just feels that he is not feeling the way he felt when he was on Clozapine and wants to go back to Clozapine for stability/safety. He denied S/H/I/P, denied drug abuse hx, relapsed on Cigarettes smoking and now with Nicotine Patch with Gum and denied other drug abuse hx.     Medically has Rt. 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    Patient is casually dressed, this morning he is bed but later seen walking on the floor.  He is approachable and communicative.  Denies any thoughts of harming self or anybody else.  No anger or agitation.  His mood is stable.  Patient reports continuation of hearing voices when asked what is saying he responded with "not much:.   Denies thoughts about harming himself and others.  He says he was not because of my thoughts and would like to go back on it

## 2023-12-20 NOTE — BH TREATMENT PLAN - NSTXDCOTHRINTERSW_PSY_ALL_CORE
SW left another message for [facility] for a return call. SW also called pt's intensive , Marj, as pt previously provided consent. Case discussed. Per Marj, prior to hospitalization pt was referred to [facility]'s [facility] program for substance abuse program, as team felt pt needed extra support. SW discussed that she was happy to make referral to program as long as pt consented. SW agreeable to discuss further with [facility]. SW to continue to follow. SW left another message for [facility] for a return call. SW also called pt's intensive , Marj, as pt previously provided consent. Case discussed. Per Marj, prior to hospitalization pt was referred to [facility]'s [facility] program for substance abuse program, as team felt pt needed extra support. SW discussed that she was happy to make referral to program as long as pt consented. SW agreeable to discuss further with [facility]. SW to continue to follow.

## 2023-12-20 NOTE — BH TREATMENT PLAN - NSTXPSYCHOINTERRN_PSY_ALL_CORE
The patient is provided frequent reality orientation, and is offered PO PRN medications for breakthrough symptoms.
Patient encouraged to engage in unit activities/therapies  Educate patient related to actions/indications of medication and the benefits of symptom management.   Reality orient patient daily and as needed.

## 2023-12-20 NOTE — BH TREATMENT PLAN - NSTXCOPEINTERRN_PSY_ALL_CORE
The patient is encouraged to attend group therapy.
The patient is encouraged to attend group therapy.

## 2023-12-21 PROCEDURE — 99232 SBSQ HOSP IP/OBS MODERATE 35: CPT

## 2023-12-21 RX ADMIN — LISINOPRIL 10 MILLIGRAM(S): 2.5 TABLET ORAL at 09:11

## 2023-12-21 RX ADMIN — Medication 1 PATCH: at 09:08

## 2023-12-21 RX ADMIN — METFORMIN HYDROCHLORIDE 1000 MILLIGRAM(S): 850 TABLET ORAL at 20:39

## 2023-12-21 RX ADMIN — Medication 2 MILLIGRAM(S): at 09:11

## 2023-12-21 RX ADMIN — Medication 3 MILLIGRAM(S): at 20:39

## 2023-12-21 RX ADMIN — ATORVASTATIN CALCIUM 40 MILLIGRAM(S): 80 TABLET, FILM COATED ORAL at 20:39

## 2023-12-21 RX ADMIN — CLOZAPINE 150 MILLIGRAM(S): 150 TABLET, ORALLY DISINTEGRATING ORAL at 20:38

## 2023-12-21 RX ADMIN — Medication 2 MILLIGRAM(S): at 20:43

## 2023-12-21 RX ADMIN — DIVALPROEX SODIUM 500 MILLIGRAM(S): 500 TABLET, DELAYED RELEASE ORAL at 09:08

## 2023-12-21 NOTE — BH INPATIENT PSYCHIATRY PROGRESS NOTE - NSBHATTESTBILLING_PSY_A_CORE
62120-[id number] OBS or IP - moderate complexity OR 35-49 mins [id number] OBS or IP - moderate complexity OR 35-49 mins

## 2023-12-21 NOTE — BH INPATIENT PSYCHIATRY PROGRESS NOTE - NSBHMETABOLIC_PSY_ALL_CORE_FT
BMI: BMI (kg/m2): 36.2 (12-08-23 @ 16:20)  HbA1c: A1C with Estimated Average Glucose Result: 6.8 % (12-09-23 @ 07:10)    Glucose:   BP: --Vital Signs Last 24 Hrs  T(C): 36.3 (12-21-23 @ 08:30), Max: 36.6 (12-20-23 @ 21:05)  T(F): 97.4 (12-21-23 @ 08:30), Max: 97.8 (12-20-23 @ 21:05)  HR: --  BP: --  BP(mean): --  RR: 16 (12-21-23 @ 08:30) (16 - 16)  SpO2: 100% (12-21-23 @ 08:30) (100% - 100%)    Orthostatic VS  12-21-23 @ 08:30  Lying BP: --/-- HR: --  Sitting BP: 150/98 HR: 96  Standing BP: 118/88 HR: 100  Site: upper right arm  Mode: electronic  Orthostatic VS  12-20-23 @ 21:05  Lying BP: --/-- HR: --  Sitting BP: 150/89 HR: 106  Standing BP: 132/86 HR: 113  Site: --  Mode: --    Lipid Panel: Date/Time: 12-09-23 @ 07:10  Cholesterol, Serum: 131  LDL Cholesterol Calculated: 69  HDL Cholesterol, Serum: 29  Total Cholesterol/HDL Ration Measurement: --  Triglycerides, Serum: 195

## 2023-12-21 NOTE — BH INPATIENT PSYCHIATRY PROGRESS NOTE - NSBHCHARTREVIEWVS_PSY_A_CORE FT
Vital Signs Last 24 Hrs  T(C): 36.3 (12-21-23 @ 08:30), Max: 36.6 (12-20-23 @ 21:05)  T(F): 97.4 (12-21-23 @ 08:30), Max: 97.8 (12-20-23 @ 21:05)  HR: --  BP: --  BP(mean): --  RR: 16 (12-21-23 @ 08:30) (16 - 16)  SpO2: 100% (12-21-23 @ 08:30) (100% - 100%)    Orthostatic VS  12-21-23 @ 08:30  Lying BP: --/-- HR: --  Sitting BP: 150/98 HR: 96  Standing BP: 118/88 HR: 100  Site: upper right arm  Mode: electronic  Orthostatic VS  12-20-23 @ 21:05  Lying BP: --/-- HR: --  Sitting BP: 150/89 HR: 106  Standing BP: 132/86 HR: 113  Site: --  Mode: --

## 2023-12-21 NOTE — BH INPATIENT PSYCHIATRY PROGRESS NOTE - NSBHASSESSSUMMFT_PSY_ALL_CORE
48 year old single, domiciled in [place], unemployed, on disability. PPHx of schizophrenia, anxiety multiple prior admissions last in 2012 at [facility]. Was previously on Clozaril 50 mg, doing well aside from feeling tired, changed psychiatrist in July and is now on Depakote and Seroquel, reports symptoms have worsened on this regimen. PMHx of HTN, HDL and restless leg who self presents to hospital for psychiatric evaluation.  Patient presents with Schizoaffective disorder with exacerbation of symptoms since recent medication change. No SI/HI. +AH, not command in nature. +Vague paranoia and delusions. These symptoms represent a change from baseline from which the patient cannot be reasonably expected to improve with current level of care. The patient is requesting voluntary inpatient psychiatric hospitalization for safety and stabilization of psychiatric symptoms.  h/o schizophrenia vs schizoaffective d/o    Continue divalproex  milliGRAM(s) Oral daily  melatonin 3 milliGRAM(s) Oral at bedtime  QUEtiapine 100 milliGRAM(s) Oral at bedtime  Patient does not need one-to-one observation.  Pt is asking to start Clozaril Will defer to primary provider.          48 year old single, domiciled in [place], unemployed, on disability. PPHx of schizophrenia, anxiety multiple prior admissions last in 2012 at [facility]. Was previously on Clozaril 50 mg, doing well aside from feeling tired, changed psychiatrist in July and is now on Depakote and Seroquel, reports symptoms have worsened on this regimen. PMHx of HTN, HDL and restless leg who self presents to hospital for psychiatric evaluation.  Patient presents with Schizoaffective disorder with exacerbation of symptoms since recent medication change. No SI/HI. +AH, not command in nature. +Vague paranoia and delusions. These symptoms represent a change from baseline from which the patient cannot be reasonably expected to improve with current level of care. The patient is requesting voluntary inpatient psychiatric hospitalization for safety and stabilization of psychiatric symptoms.  h/o schizophrenia vs schizoaffective d/o    Continue divalproex  milliGRAM(s) Oral daily  melatonin 3 milliGRAM(s) Oral at bedtime  QUEtiapine 100 milliGRAM(s) Oral at bedtime  Patient does not need one-to-one observation.  Pt is asking to start Clozaril Will defer to primary provider.

## 2023-12-21 NOTE — BH INPATIENT PSYCHIATRY PROGRESS NOTE - NSBHFUPINTERVALHXFT_PSY_A_CORE
12/21/2023: Patient was seen today AM, chart reviewed and discussed in team. He endorses that he feels much better, rates his improvement further high and to   have labs in AM for continued stabilization with associated safety and evidence based treatment. He agreed to have labs in AM and to have Prolixin 25 mg IM on 12/27/2023. No meds changes for now. Low suicide risk as he has no prior or current SI/SA.     Mitigating Factors-Hospitalized, meds compliance  Protective Factors--No prior SA/SI, open with issues for adequate symptom control.

## 2023-12-22 LAB
BASOPHILS # BLD AUTO: 0.04 K/UL — SIGNIFICANT CHANGE UP (ref 0–0.2)
BASOPHILS # BLD AUTO: 0.04 K/UL — SIGNIFICANT CHANGE UP (ref 0–0.2)
BASOPHILS NFR BLD AUTO: 0.6 % — SIGNIFICANT CHANGE UP (ref 0–2)
BASOPHILS NFR BLD AUTO: 0.6 % — SIGNIFICANT CHANGE UP (ref 0–2)
EOSINOPHIL # BLD AUTO: 0.19 K/UL — SIGNIFICANT CHANGE UP (ref 0–0.5)
EOSINOPHIL # BLD AUTO: 0.19 K/UL — SIGNIFICANT CHANGE UP (ref 0–0.5)
EOSINOPHIL NFR BLD AUTO: 2.6 % — SIGNIFICANT CHANGE UP (ref 0–6)
EOSINOPHIL NFR BLD AUTO: 2.6 % — SIGNIFICANT CHANGE UP (ref 0–6)
ERYTHROCYTE [SEDIMENTATION RATE] IN BLOOD: 2 MM/HR — SIGNIFICANT CHANGE UP (ref 0–15)
ERYTHROCYTE [SEDIMENTATION RATE] IN BLOOD: 2 MM/HR — SIGNIFICANT CHANGE UP (ref 0–15)
HCT VFR BLD CALC: 44.9 % — SIGNIFICANT CHANGE UP (ref 39–50)
HCT VFR BLD CALC: 44.9 % — SIGNIFICANT CHANGE UP (ref 39–50)
HGB BLD-MCNC: 15.4 G/DL — SIGNIFICANT CHANGE UP (ref 13–17)
HGB BLD-MCNC: 15.4 G/DL — SIGNIFICANT CHANGE UP (ref 13–17)
IMM GRANULOCYTES NFR BLD AUTO: 0.3 % — SIGNIFICANT CHANGE UP (ref 0–0.9)
IMM GRANULOCYTES NFR BLD AUTO: 0.3 % — SIGNIFICANT CHANGE UP (ref 0–0.9)
LYMPHOCYTES # BLD AUTO: 2.8 K/UL — SIGNIFICANT CHANGE UP (ref 1–3.3)
LYMPHOCYTES # BLD AUTO: 2.8 K/UL — SIGNIFICANT CHANGE UP (ref 1–3.3)
LYMPHOCYTES # BLD AUTO: 38.8 % — SIGNIFICANT CHANGE UP (ref 13–44)
LYMPHOCYTES # BLD AUTO: 38.8 % — SIGNIFICANT CHANGE UP (ref 13–44)
MCHC RBC-ENTMCNC: 29.7 PG — SIGNIFICANT CHANGE UP (ref 27–34)
MCHC RBC-ENTMCNC: 29.7 PG — SIGNIFICANT CHANGE UP (ref 27–34)
MCHC RBC-ENTMCNC: 34.3 GM/DL — SIGNIFICANT CHANGE UP (ref 32–36)
MCHC RBC-ENTMCNC: 34.3 GM/DL — SIGNIFICANT CHANGE UP (ref 32–36)
MCV RBC AUTO: 86.5 FL — SIGNIFICANT CHANGE UP (ref 80–100)
MCV RBC AUTO: 86.5 FL — SIGNIFICANT CHANGE UP (ref 80–100)
MONOCYTES # BLD AUTO: 0.5 K/UL — SIGNIFICANT CHANGE UP (ref 0–0.9)
MONOCYTES # BLD AUTO: 0.5 K/UL — SIGNIFICANT CHANGE UP (ref 0–0.9)
MONOCYTES NFR BLD AUTO: 6.9 % — SIGNIFICANT CHANGE UP (ref 2–14)
MONOCYTES NFR BLD AUTO: 6.9 % — SIGNIFICANT CHANGE UP (ref 2–14)
NEUTROPHILS # BLD AUTO: 3.67 K/UL — SIGNIFICANT CHANGE UP (ref 1.8–7.4)
NEUTROPHILS # BLD AUTO: 3.67 K/UL — SIGNIFICANT CHANGE UP (ref 1.8–7.4)
NEUTROPHILS NFR BLD AUTO: 50.8 % — SIGNIFICANT CHANGE UP (ref 43–77)
NEUTROPHILS NFR BLD AUTO: 50.8 % — SIGNIFICANT CHANGE UP (ref 43–77)
PLATELET # BLD AUTO: 264 K/UL — SIGNIFICANT CHANGE UP (ref 150–400)
PLATELET # BLD AUTO: 264 K/UL — SIGNIFICANT CHANGE UP (ref 150–400)
RBC # BLD: 5.19 M/UL — SIGNIFICANT CHANGE UP (ref 4.2–5.8)
RBC # BLD: 5.19 M/UL — SIGNIFICANT CHANGE UP (ref 4.2–5.8)
RBC # FLD: 12.8 % — SIGNIFICANT CHANGE UP (ref 10.3–14.5)
RBC # FLD: 12.8 % — SIGNIFICANT CHANGE UP (ref 10.3–14.5)
TROPONIN I, HIGH SENSITIVITY RESULT: 9.93 NG/L — SIGNIFICANT CHANGE UP
TROPONIN I, HIGH SENSITIVITY RESULT: 9.93 NG/L — SIGNIFICANT CHANGE UP
WBC # BLD: 7.22 K/UL — SIGNIFICANT CHANGE UP (ref 3.8–10.5)
WBC # BLD: 7.22 K/UL — SIGNIFICANT CHANGE UP (ref 3.8–10.5)
WBC # FLD AUTO: 7.22 K/UL — SIGNIFICANT CHANGE UP (ref 3.8–10.5)
WBC # FLD AUTO: 7.22 K/UL — SIGNIFICANT CHANGE UP (ref 3.8–10.5)

## 2023-12-22 PROCEDURE — 99232 SBSQ HOSP IP/OBS MODERATE 35: CPT

## 2023-12-22 RX ORDER — CLOZAPINE 150 MG/1
200 TABLET, ORALLY DISINTEGRATING ORAL AT BEDTIME
Refills: 0 | Status: DISCONTINUED | OUTPATIENT
Start: 2023-12-22 | End: 2024-01-04

## 2023-12-22 RX ADMIN — Medication 2 MILLIGRAM(S): at 09:54

## 2023-12-22 RX ADMIN — Medication 3 MILLIGRAM(S): at 20:33

## 2023-12-22 RX ADMIN — Medication 1 PATCH: at 09:53

## 2023-12-22 RX ADMIN — Medication 2 MILLIGRAM(S): at 18:06

## 2023-12-22 RX ADMIN — DIVALPROEX SODIUM 500 MILLIGRAM(S): 500 TABLET, DELAYED RELEASE ORAL at 09:38

## 2023-12-22 RX ADMIN — ATORVASTATIN CALCIUM 40 MILLIGRAM(S): 80 TABLET, FILM COATED ORAL at 20:32

## 2023-12-22 RX ADMIN — LISINOPRIL 10 MILLIGRAM(S): 2.5 TABLET ORAL at 09:38

## 2023-12-22 RX ADMIN — Medication 1 PATCH: at 18:42

## 2023-12-22 RX ADMIN — Medication 2 MILLIGRAM(S): at 14:26

## 2023-12-22 RX ADMIN — Medication 1 PATCH: at 09:38

## 2023-12-22 RX ADMIN — Medication 1 PATCH: at 07:44

## 2023-12-22 RX ADMIN — CLOZAPINE 200 MILLIGRAM(S): 150 TABLET, ORALLY DISINTEGRATING ORAL at 20:32

## 2023-12-22 RX ADMIN — METFORMIN HYDROCHLORIDE 1000 MILLIGRAM(S): 850 TABLET ORAL at 21:54

## 2023-12-22 NOTE — BH INPATIENT PSYCHIATRY PROGRESS NOTE - NSBHFUPINTERVALHXFT_PSY_A_CORE
12/22/2023: Patient was seen today AM, chart reviewed and discussed in team. 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 His labs are improving with increased Absolute Count and thus Clozapine to be increased further to 200 mg HS and was advised to compare the difference of staying on Clozapine 150/200 respectively for symptom control. He has no Clozapine induced s/e till now. He has to receive Prolixin Decanoate 25 mg Im on 12/27. Low Suicide risk as there are no drug abuse hx, no prior SA.    12/21/2023: Patient was seen today AM, chart reviewed and discussed in team. He endorses that he feels much better, rates his improvement further high and to   have labs in AM for continued stabilization with associated safety and evidence based treatment. He agreed to have labs in AM and to have Prolixin 25 mg IM on 12/27/2023. No meds changes for now. Low suicide risk as he has no prior or current SI/SA.     Mitigating Factors-Hospitalized, meds compliance  Protective Factors--No prior SA/SI, open with issues for adequate symptom control.

## 2023-12-22 NOTE — BH INPATIENT PSYCHIATRY PROGRESS NOTE - OTHER
Clarissea
 Clarissea
 taraa
 Clarissea
 taraa
 Clarissea
 Clarissea
 taraa
 Clarissea
 taraa

## 2023-12-22 NOTE — BH INPATIENT PSYCHIATRY PROGRESS NOTE - NSBHASSESSSUMMFT_PSY_ALL_CORE
48 year old single, domiciled in [place], unemployed, on disability. PPHx of schizophrenia, anxiety multiple prior admissions last in 2012 at [facility]. Was previously on Clozaril 50 mg, doing well aside from feeling tired, changed psychiatrist in July and is now on Depakote and Seroquel, reports symptoms have worsened on this regimen. PMHx of HTN, HDL and restless leg who self presents to hospital for psychiatric evaluation.  Patient presents with Schizoaffective disorder with exacerbation of symptoms since recent medication change. No SI/HI. +AH, not command in nature. +Vague paranoia and delusions. These symptoms represent a change from baseline from which the patient cannot be reasonably expected to improve with current level of care. The patient is requesting voluntary inpatient psychiatric hospitalization for safety and stabilization of psychiatric symptoms.  h/o schizophrenia vs schizoaffective d/o    Continue divalproex  milliGRAM(s) Oral daily  melatonin 3 milliGRAM(s) Oral at bedtime  QUEtiapine 100 milliGRAM(s) Oral at bedtime  Patient does not need one-to-one observation.  Pt is asking to start Clozaril Will defer to primary provider.          48 year old single, domiciled in [place], unemployed, on disability. PPHx of schizophrenia, anxiety multiple prior admissions last in 2012 at [facility]. Was previously on Clozaril 50 mg, doing well aside from feeling tired, changed psychiatrist in July and is now on Depakote and Seroquel, reports symptoms have worsened on this regimen. PMHx of HTN, HDL and restless leg who self presents to hospital for psychiatric evaluation.  Patient presents with Schizoaffective disorder with exacerbation of symptoms since recent medication change. No SI/HI. +AH, not command in nature. +Vague paranoia and delusions. These symptoms represent a change from baseline from which the patient cannot be reasonably expected to improve with current level of care. The patient is requesting voluntary inpatient psychiatric hospitalization for safety and stabilization of psychiatric symptoms.  h/o schizophrenia vs schizoaffective d/o    Continue divalproex  milliGRAM(s) Oral daily  melatonin 3 milliGRAM(s) Oral at bedtime  QUEtiapine 100 milliGRAM(s) Oral at bedtime  Patient does not need one-to-one observation.  Pt is asking to start Clozaril Will defer to primary provider.

## 2023-12-22 NOTE — BH INPATIENT PSYCHIATRY PROGRESS NOTE - NSBHCHARTREVIEWVS_PSY_A_CORE FT
Vital Signs Last 24 Hrs  T(C): 36.3 (12-22-23 @ 07:59), Max: 36.3 (12-21-23 @ 20:33)  T(F): 97.3 (12-22-23 @ 07:59), Max: 97.4 (12-21-23 @ 20:33)  HR: --  BP: --  BP(mean): --  RR: 16 (12-22-23 @ 07:59) (16 - 16)  SpO2: 100% (12-22-23 @ 07:59) (98% - 100%)    Orthostatic VS  12-22-23 @ 07:59  Lying BP: --/-- HR: --  Sitting BP: 137/89 HR: 94  Standing BP: 121/82 HR: 97  Site: upper right arm  Mode: electronic  Orthostatic VS  12-21-23 @ 20:33  Lying BP: --/-- HR: --  Sitting BP: 129/98 HR: 104  Standing BP: 121/85 HR: 104  Site: upper left arm  Mode: electronic  Orthostatic VS  12-21-23 @ 08:30  Lying BP: --/-- HR: --  Sitting BP: 150/98 HR: 96  Standing BP: 118/88 HR: 100  Site: upper right arm  Mode: electronic  Orthostatic VS  12-20-23 @ 21:05  Lying BP: --/-- HR: --  Sitting BP: 150/89 HR: 106  Standing BP: 132/86 HR: 113  Site: --  Mode: --

## 2023-12-22 NOTE — BH INPATIENT PSYCHIATRY PROGRESS NOTE - NSBHMETABOLIC_PSY_ALL_CORE_FT
BMI: BMI (kg/m2): 36.2 (12-08-23 @ 16:20)  HbA1c: A1C with Estimated Average Glucose Result: 6.8 % (12-09-23 @ 07:10)    Glucose:   BP: --Vital Signs Last 24 Hrs  T(C): 36.3 (12-22-23 @ 07:59), Max: 36.3 (12-21-23 @ 20:33)  T(F): 97.3 (12-22-23 @ 07:59), Max: 97.4 (12-21-23 @ 20:33)  HR: --  BP: --  BP(mean): --  RR: 16 (12-22-23 @ 07:59) (16 - 16)  SpO2: 100% (12-22-23 @ 07:59) (98% - 100%)    Orthostatic VS  12-22-23 @ 07:59  Lying BP: --/-- HR: --  Sitting BP: 137/89 HR: 94  Standing BP: 121/82 HR: 97  Site: upper right arm  Mode: electronic  Orthostatic VS  12-21-23 @ 20:33  Lying BP: --/-- HR: --  Sitting BP: 129/98 HR: 104  Standing BP: 121/85 HR: 104  Site: upper left arm  Mode: electronic  Orthostatic VS  12-21-23 @ 08:30  Lying BP: --/-- HR: --  Sitting BP: 150/98 HR: 96  Standing BP: 118/88 HR: 100  Site: upper right arm  Mode: electronic  Orthostatic VS  12-20-23 @ 21:05  Lying BP: --/-- HR: --  Sitting BP: 150/89 HR: 106  Standing BP: 132/86 HR: 113  Site: --  Mode: --    Lipid Panel: Date/Time: 12-09-23 @ 07:10  Cholesterol, Serum: 131  LDL Cholesterol Calculated: 69  HDL Cholesterol, Serum: 29  Total Cholesterol/HDL Ration Measurement: --  Triglycerides, Serum: 195

## 2023-12-22 NOTE — BH INPATIENT PSYCHIATRY PROGRESS NOTE - CURRENT MEDICATION
MEDICATIONS  (STANDING):  atorvastatin 40 milliGRAM(s) Oral at bedtime  cloZAPine 200 milliGRAM(s) Oral at bedtime  divalproex  milliGRAM(s) Oral daily  influenza   Vaccine 0.5 milliLiter(s) IntraMuscular once  lisinopril 10 milliGRAM(s) Oral daily  melatonin 3 milliGRAM(s) Oral at bedtime  metFORMIN 1000 milliGRAM(s) Oral at bedtime  nicotine - 21 mG/24Hr(s) Patch 1 Patch Transdermal daily    MEDICATIONS  (PRN):  acetaminophen     Tablet .. 650 milliGRAM(s) Oral every 6 hours PRN Moderate Pain (4 - 6)  hydrOXYzine hydrochloride 25 milliGRAM(s) Oral every 6 hours PRN Anxiety  nicotine  Polacrilex Gum 2 milliGRAM(s) Oral every 2 hours PRN NRT  nicotine  Polacrilex Gum 2 milliGRAM(s) Oral every 4 hours PRN nicotine withdrawal

## 2023-12-22 NOTE — BH INPATIENT PSYCHIATRY PROGRESS NOTE - NSBHATTESTBILLING_PSY_A_CORE
92798-[id number] OBS or IP - moderate complexity OR 35-49 mins [id number] OBS or IP - moderate complexity OR 35-49 mins

## 2023-12-23 RX ADMIN — CLOZAPINE 200 MILLIGRAM(S): 150 TABLET, ORALLY DISINTEGRATING ORAL at 20:54

## 2023-12-23 RX ADMIN — Medication 2 MILLIGRAM(S): at 20:55

## 2023-12-23 RX ADMIN — Medication 650 MILLIGRAM(S): at 21:53

## 2023-12-23 RX ADMIN — LISINOPRIL 10 MILLIGRAM(S): 2.5 TABLET ORAL at 09:02

## 2023-12-23 RX ADMIN — Medication 650 MILLIGRAM(S): at 20:53

## 2023-12-23 RX ADMIN — Medication 3 MILLIGRAM(S): at 21:00

## 2023-12-23 RX ADMIN — Medication 1 PATCH: at 09:02

## 2023-12-23 RX ADMIN — DIVALPROEX SODIUM 500 MILLIGRAM(S): 500 TABLET, DELAYED RELEASE ORAL at 09:02

## 2023-12-23 RX ADMIN — ATORVASTATIN CALCIUM 40 MILLIGRAM(S): 80 TABLET, FILM COATED ORAL at 20:54

## 2023-12-23 RX ADMIN — METFORMIN HYDROCHLORIDE 1000 MILLIGRAM(S): 850 TABLET ORAL at 20:54

## 2023-12-24 RX ADMIN — METFORMIN HYDROCHLORIDE 1000 MILLIGRAM(S): 850 TABLET ORAL at 21:13

## 2023-12-24 RX ADMIN — Medication 1 PATCH: at 08:00

## 2023-12-24 RX ADMIN — ATORVASTATIN CALCIUM 40 MILLIGRAM(S): 80 TABLET, FILM COATED ORAL at 21:55

## 2023-12-24 RX ADMIN — Medication 2 MILLIGRAM(S): at 21:56

## 2023-12-24 RX ADMIN — Medication 1 PATCH: at 11:16

## 2023-12-24 RX ADMIN — DIVALPROEX SODIUM 500 MILLIGRAM(S): 500 TABLET, DELAYED RELEASE ORAL at 11:17

## 2023-12-24 RX ADMIN — Medication 3 MILLIGRAM(S): at 21:56

## 2023-12-24 RX ADMIN — CLOZAPINE 200 MILLIGRAM(S): 150 TABLET, ORALLY DISINTEGRATING ORAL at 21:55

## 2023-12-24 RX ADMIN — LISINOPRIL 10 MILLIGRAM(S): 2.5 TABLET ORAL at 11:16

## 2023-12-25 RX ADMIN — Medication 1 PATCH: at 09:13

## 2023-12-25 RX ADMIN — ATORVASTATIN CALCIUM 40 MILLIGRAM(S): 80 TABLET, FILM COATED ORAL at 20:41

## 2023-12-25 RX ADMIN — DIVALPROEX SODIUM 500 MILLIGRAM(S): 500 TABLET, DELAYED RELEASE ORAL at 09:13

## 2023-12-25 RX ADMIN — Medication 3 MILLIGRAM(S): at 20:42

## 2023-12-25 RX ADMIN — Medication 1 PATCH: at 09:19

## 2023-12-25 RX ADMIN — CLOZAPINE 200 MILLIGRAM(S): 150 TABLET, ORALLY DISINTEGRATING ORAL at 20:41

## 2023-12-25 RX ADMIN — Medication 1 PATCH: at 08:03

## 2023-12-25 RX ADMIN — LISINOPRIL 10 MILLIGRAM(S): 2.5 TABLET ORAL at 09:13

## 2023-12-25 RX ADMIN — METFORMIN HYDROCHLORIDE 1000 MILLIGRAM(S): 850 TABLET ORAL at 20:42

## 2023-12-25 RX ADMIN — Medication 2 MILLIGRAM(S): at 12:29

## 2023-12-25 RX ADMIN — Medication 2 MILLIGRAM(S): at 20:42

## 2023-12-26 PROCEDURE — 99232 SBSQ HOSP IP/OBS MODERATE 35: CPT

## 2023-12-26 RX ADMIN — Medication 1 PATCH: at 09:30

## 2023-12-26 RX ADMIN — METFORMIN HYDROCHLORIDE 1000 MILLIGRAM(S): 850 TABLET ORAL at 20:31

## 2023-12-26 RX ADMIN — ATORVASTATIN CALCIUM 40 MILLIGRAM(S): 80 TABLET, FILM COATED ORAL at 20:31

## 2023-12-26 RX ADMIN — Medication 3 MILLIGRAM(S): at 20:31

## 2023-12-26 RX ADMIN — Medication 1 PATCH: at 08:54

## 2023-12-26 RX ADMIN — Medication 1 PATCH: at 08:53

## 2023-12-26 RX ADMIN — Medication 2 MILLIGRAM(S): at 09:32

## 2023-12-26 RX ADMIN — LISINOPRIL 10 MILLIGRAM(S): 2.5 TABLET ORAL at 09:30

## 2023-12-26 RX ADMIN — DIVALPROEX SODIUM 500 MILLIGRAM(S): 500 TABLET, DELAYED RELEASE ORAL at 09:30

## 2023-12-26 RX ADMIN — Medication 1 PATCH: at 18:26

## 2023-12-26 RX ADMIN — CLOZAPINE 200 MILLIGRAM(S): 150 TABLET, ORALLY DISINTEGRATING ORAL at 20:31

## 2023-12-26 RX ADMIN — Medication 2 MILLIGRAM(S): at 18:07

## 2023-12-26 NOTE — BH INPATIENT PSYCHIATRY PROGRESS NOTE - PRN MEDS
MEDICATIONS  (PRN):  acetaminophen     Tablet .. 650 milliGRAM(s) Oral every 6 hours PRN Moderate Pain (4 - 6)  hydrOXYzine hydrochloride 25 milliGRAM(s) Oral every 6 hours PRN Anxiety  nicotine  Polacrilex Gum 2 milliGRAM(s) Oral every 4 hours PRN nicotine withdrawal  nicotine  Polacrilex Gum 2 milliGRAM(s) Oral every 2 hours PRN NRT   MEDICATIONS  (PRN):  acetaminophen     Tablet .. 650 milliGRAM(s) Oral every 6 hours PRN Moderate Pain (4 - 6)  hydrOXYzine hydrochloride 25 milliGRAM(s) Oral every 6 hours PRN Anxiety  nicotine  Polacrilex Gum 2 milliGRAM(s) Oral every 2 hours PRN NRT  nicotine  Polacrilex Gum 2 milliGRAM(s) Oral every 4 hours PRN nicotine withdrawal

## 2023-12-26 NOTE — BH INPATIENT PSYCHIATRY PROGRESS NOTE - NSBHCHARTREVIEWVS_PSY_A_CORE FT
Vital Signs Last 24 Hrs  T(C): 36.3 (12-26-23 @ 08:48), Max: 36.3 (12-25-23 @ 20:56)  T(F): 97.3 (12-26-23 @ 08:48), Max: 97.4 (12-25-23 @ 20:56)  HR: --  BP: --  BP(mean): --  RR: 16 (12-26-23 @ 08:48) (16 - 16)  SpO2: 100% (12-26-23 @ 08:48) (98% - 100%)    Orthostatic VS  12-26-23 @ 08:48  Lying BP: --/-- HR: --  Sitting BP: 135/94 HR: 91  Standing BP: 126/74 HR: 98  Site: upper right arm  Mode: electronic  Orthostatic VS  12-25-23 @ 20:56  Lying BP: --/-- HR: --  Sitting BP: 146/80 HR: 108  Standing BP: 113/79 HR: 115  Site: --  Mode: electronic  Orthostatic VS  12-25-23 @ 08:19  Lying BP: --/-- HR: --  Sitting BP: 148/98 HR: 87  Standing BP: 120/78 HR: 92  Site: upper right arm  Mode: electronic  Orthostatic VS  12-24-23 @ 21:13  Lying BP: --/-- HR: --  Sitting BP: 140/82 HR: 110  Standing BP: 132/79 HR: 104  Site: --  Mode: --   Vital Signs Last 24 Hrs  T(C): 36.2 (12-27-23 @ 08:23), Max: 36.2 (12-27-23 @ 08:23)  T(F): 97.1 (12-27-23 @ 08:23), Max: 97.1 (12-27-23 @ 08:23)  HR: --  BP: --  BP(mean): --  RR: 16 (12-27-23 @ 08:23) (16 - 16)  SpO2: 98% (12-27-23 @ 08:23) (98% - 98%)    Orthostatic VS  12-27-23 @ 08:23  Lying BP: --/-- HR: --  Sitting BP: 141/90 HR: 91  Standing BP: 140/88 HR: 92  Site: upper right arm  Mode: electronic  Orthostatic VS  12-26-23 @ 08:48  Lying BP: --/-- HR: --  Sitting BP: 135/94 HR: 91  Standing BP: 126/74 HR: 98  Site: upper right arm  Mode: electronic  Orthostatic VS  12-25-23 @ 20:56  Lying BP: --/-- HR: --  Sitting BP: 146/80 HR: 108  Standing BP: 113/79 HR: 115  Site: --  Mode: electronic

## 2023-12-26 NOTE — BH INPATIENT PSYCHIATRY PROGRESS NOTE - NSBHATTESTBILLING_PSY_A_CORE
69661-Qeiadjshyv OBS or IP - moderate complexity OR 35-49 mins 23904-Lddparvlef OBS or IP - moderate complexity OR 35-49 mins

## 2023-12-26 NOTE — BH INPATIENT PSYCHIATRY PROGRESS NOTE - NSBHASSESSSUMMFT_PSY_ALL_CORE
48 year old single, domic[place], unemployed, on disability. PPHx of schizophrenia, anxiety multiple prior admissions last in 2012 at [facility]. Was previously on Clozaril 50 mg, doing well aside from feeling tired, changed psychiatrist in July and is now on Depakote and Seroquel, reports symptoms have worsened on this regimen. PMHx of HTN, HDL and restless leg who self presents to hospital for psychiatric evaluation.  Patient presents with Schizoaffective disorder with exacerbation of symptoms since recent medication change. No SI/HI. +AH, not command in nature. +Vague paranoia and delusions. These symptoms represent a change from baseline from which the patient cannot be reasonably expected to improve with current level of care. The patient is requesting voluntary inpatient psychiatric hospitalization for safety and stabilization of psychiatric symptoms.  h/o schizophrenia vs schizoaffective d/o    Plan:  Continue current meds:   cloZAPine 200 milliGRAM(s) Oral at bedtime  divalproex  milliGRAM(s) Oral daily  melatonin 3 milliGRAM(s) Oral at bedtime  nicotine - 21 mG/24Hr(s) Patch 1 Patch Transdermal daily    Order VPA level and Clozaril level.   Provided education to pt about meds management. Advised that there is no need to nicerase mes if his symptoms are in remissions    48 year old single, domiciled [place], unemployed, on disability. PPHx of schizophrenia, anxiety multiple prior admissions last in 2012 at [facility]. Was previously on Clozaril 50 mg, doing well aside from feeling tired, changed psychiatrist in July and is now on Depakote and Seroquel, reports symptoms have worsened on this regimen. PMHx of HTN, HDL and restless leg who self presents to hospital for psychiatric evaluation.  Patient presents with Schizoaffective disorder with exacerbation of symptoms since recent medication change. No SI/HI. +AH, not command in nature. +Vague paranoia and delusions. These symptoms represent a change from baseline from which the patient cannot be reasonably expected to improve with current level of care. The patient is requesting voluntary inpatient psychiatric hospitalization for safety and stabilization of psychiatric symptoms.  h/o schizophrenia vs schizoaffective d/o    Plan:  Continue current meds:   cloZAPine 200 milliGRAM(s) Oral at bedtime  divalproex  milliGRAM(s) Oral daily  melatonin 3 milliGRAM(s) Oral at bedtime  nicotine - 21 mG/24Hr(s) Patch 1 Patch Transdermal daily    Order VPA level and Clozaril level.   Provided education to pt about meds management. Advised that there is no need to nicerase mes if his symptoms are in remissions    48 year old single, domiciled [place], unemployed, on disability. PPHx of schizophrenia, anxiety multiple prior admissions last in 2012 at [facility]. Was previously on Clozaril 50 mg, doing well aside from feeling tired, changed psychiatrist in July and is now on Depakote and Seroquel, reports symptoms have worsened on this regimen. PMHx of HTN, HDL and restless leg who self presents to hospital for psychiatric evaluation.  Patient presents with Schizoaffective disorder with exacerbation of symptoms since recent medication change. No SI/HI. +AH, not command in nature. +Vague paranoia and delusions. These symptoms represent a change from baseline from which the patient cannot be reasonably expected to improve with current level of care. The patient is requesting voluntary inpatient psychiatric hospitalization for safety and stabilization of psychiatric symptoms.  h/o schizophrenia vs schizoaffective d/o    Plan:  Continue current meds:   cloZAPine 200 milliGRAM(s) Oral at bedtime  divalproex  milliGRAM(s) Oral daily  melatonin 3 milliGRAM(s) Oral at bedtime  nicotine - 21 mG/24Hr(s) Patch 1 Patch Transdermal daily  NEW PROBLEMS: COMPLIANCE ISSUES  Order VPA level and Clozaril level.   Provided education to pt about meds management. Advised that there is no need to nicerase mes if his symptoms are in remissions    48 year old single, domiciled [place], unemployed, on disability. PPHx of schizophrenia, anxiety multiple prior admissions last in 2012 at [facility]. Was previously on Clozaril 50 mg, doing well aside from feeling tired, changed psychiatrist in July and is now on Depakote and Seroquel, reports symptoms have worsened on this regimen. PMHx of HTN, HDL and restless leg who self presents to hospital for psychiatric evaluation.  Patient presents with Schizoaffective disorder with exacerbation of symptoms since recent medication change. No SI/HI. +AH, not command in nature. +Vague paranoia and delusions. These symptoms represent a change from baseline from which the patient cannot be reasonably expected to improve with current level of care. The patient is requesting voluntary inpatient psychiatric hospitalization for safety and stabilization of psychiatric symptoms.  h/o schizophrenia vs schizoaffective d/o    Plan:  Continue current meds:   cloZAPine 200 milliGRAM(s) Oral at bedtime  divalproex  milliGRAM(s) Oral daily  melatonin 3 milliGRAM(s) Oral at bedtime  nicotine - 21 mG/24Hr(s) Patch 1 Patch Transdermal daily  NEW PROBLEMS: COMPLIANCE ISSUES  Order VPA level and Clozaril level.   Provided education to pt about meds management. Advised that there is no need to nicerase mes if his symptoms are in remissions

## 2023-12-26 NOTE — BH INPATIENT PSYCHIATRY PROGRESS NOTE - NSBHFUPINTERVALHXFT_PSY_A_CORE
12/26/2023:    12/26/2023:   Patient was in his bed in the morning.  He denies any significant problems denies being depressed, denies having thoughts about killing himself and denies hallucinations.  However he would like to be on higher dose of clozapine.

## 2023-12-26 NOTE — BH INPATIENT PSYCHIATRY PROGRESS NOTE - NSBHMETABOLIC_PSY_ALL_CORE_FT
BMI: BMI (kg/m2): 36.2 (12-08-23 @ 16:20)  HbA1c: A1C with Estimated Average Glucose Result: 6.8 % (12-09-23 @ 07:10)    Glucose:   BP: --Vital Signs Last 24 Hrs  T(C): 36.3 (12-26-23 @ 08:48), Max: 36.3 (12-25-23 @ 20:56)  T(F): 97.3 (12-26-23 @ 08:48), Max: 97.4 (12-25-23 @ 20:56)  HR: --  BP: --  BP(mean): --  RR: 16 (12-26-23 @ 08:48) (16 - 16)  SpO2: 100% (12-26-23 @ 08:48) (98% - 100%)    Orthostatic VS  12-26-23 @ 08:48  Lying BP: --/-- HR: --  Sitting BP: 135/94 HR: 91  Standing BP: 126/74 HR: 98  Site: upper right arm  Mode: electronic  Orthostatic VS  12-25-23 @ 20:56  Lying BP: --/-- HR: --  Sitting BP: 146/80 HR: 108  Standing BP: 113/79 HR: 115  Site: --  Mode: electronic  Orthostatic VS  12-25-23 @ 08:19  Lying BP: --/-- HR: --  Sitting BP: 148/98 HR: 87  Standing BP: 120/78 HR: 92  Site: upper right arm  Mode: electronic  Orthostatic VS  12-24-23 @ 21:13  Lying BP: --/-- HR: --  Sitting BP: 140/82 HR: 110  Standing BP: 132/79 HR: 104  Site: --  Mode: --    Lipid Panel: Date/Time: 12-09-23 @ 07:10  Cholesterol, Serum: 131  LDL Cholesterol Calculated: 69  HDL Cholesterol, Serum: 29  Total Cholesterol/HDL Ration Measurement: --  Triglycerides, Serum: 195   BMI: BMI (kg/m2): 36.2 (12-08-23 @ 16:20)  HbA1c: A1C with Estimated Average Glucose Result: 6.8 % (12-09-23 @ 07:10)    Glucose:   BP: --Vital Signs Last 24 Hrs  T(C): 36.2 (12-27-23 @ 08:23), Max: 36.2 (12-27-23 @ 08:23)  T(F): 97.1 (12-27-23 @ 08:23), Max: 97.1 (12-27-23 @ 08:23)  HR: --  BP: --  BP(mean): --  RR: 16 (12-27-23 @ 08:23) (16 - 16)  SpO2: 98% (12-27-23 @ 08:23) (98% - 98%)    Orthostatic VS  12-27-23 @ 08:23  Lying BP: --/-- HR: --  Sitting BP: 141/90 HR: 91  Standing BP: 140/88 HR: 92  Site: upper right arm  Mode: electronic  Orthostatic VS  12-26-23 @ 08:48  Lying BP: --/-- HR: --  Sitting BP: 135/94 HR: 91  Standing BP: 126/74 HR: 98  Site: upper right arm  Mode: electronic  Orthostatic VS  12-25-23 @ 20:56  Lying BP: --/-- HR: --  Sitting BP: 146/80 HR: 108  Standing BP: 113/79 HR: 115  Site: --  Mode: electronic    Lipid Panel: Date/Time: 12-09-23 @ 07:10  Cholesterol, Serum: 131  LDL Cholesterol Calculated: 69  HDL Cholesterol, Serum: 29  Total Cholesterol/HDL Ration Measurement: --  Triglycerides, Serum: 195

## 2023-12-26 NOTE — BH INPATIENT PSYCHIATRY PROGRESS NOTE - CURRENT MEDICATION
MEDICATIONS  (STANDING):  atorvastatin 40 milliGRAM(s) Oral at bedtime  cloZAPine 200 milliGRAM(s) Oral at bedtime  divalproex  milliGRAM(s) Oral daily  influenza   Vaccine 0.5 milliLiter(s) IntraMuscular once  lisinopril 10 milliGRAM(s) Oral daily  melatonin 3 milliGRAM(s) Oral at bedtime  metFORMIN 1000 milliGRAM(s) Oral at bedtime  nicotine - 21 mG/24Hr(s) Patch 1 Patch Transdermal daily    MEDICATIONS  (PRN):  acetaminophen     Tablet .. 650 milliGRAM(s) Oral every 6 hours PRN Moderate Pain (4 - 6)  hydrOXYzine hydrochloride 25 milliGRAM(s) Oral every 6 hours PRN Anxiety  nicotine  Polacrilex Gum 2 milliGRAM(s) Oral every 4 hours PRN nicotine withdrawal  nicotine  Polacrilex Gum 2 milliGRAM(s) Oral every 2 hours PRN NRT   MEDICATIONS  (STANDING):  atorvastatin 40 milliGRAM(s) Oral at bedtime  cloZAPine 200 milliGRAM(s) Oral at bedtime  divalproex  milliGRAM(s) Oral daily  influenza   Vaccine 0.5 milliLiter(s) IntraMuscular once  lisinopril 10 milliGRAM(s) Oral daily  melatonin 3 milliGRAM(s) Oral at bedtime  metFORMIN 1000 milliGRAM(s) Oral at bedtime  nicotine - 21 mG/24Hr(s) Patch 1 Patch Transdermal daily    MEDICATIONS  (PRN):  acetaminophen     Tablet .. 650 milliGRAM(s) Oral every 6 hours PRN Moderate Pain (4 - 6)  hydrOXYzine hydrochloride 25 milliGRAM(s) Oral every 6 hours PRN Anxiety  nicotine  Polacrilex Gum 2 milliGRAM(s) Oral every 2 hours PRN NRT  nicotine  Polacrilex Gum 2 milliGRAM(s) Oral every 4 hours PRN nicotine withdrawal

## 2023-12-27 LAB
CLOZAPINE SERPL-MCNC: 218 NG/ML — LOW (ref 350–600)
CLOZAPINE SERPL-MCNC: 218 NG/ML — LOW (ref 350–600)
VALPROATE SERPL-MCNC: 14 UG/ML — LOW (ref 50–100)
VALPROATE SERPL-MCNC: 14 UG/ML — LOW (ref 50–100)

## 2023-12-27 PROCEDURE — 99231 SBSQ HOSP IP/OBS SF/LOW 25: CPT

## 2023-12-27 RX ADMIN — METFORMIN HYDROCHLORIDE 1000 MILLIGRAM(S): 850 TABLET ORAL at 22:00

## 2023-12-27 RX ADMIN — Medication 1 PATCH: at 09:02

## 2023-12-27 RX ADMIN — ATORVASTATIN CALCIUM 40 MILLIGRAM(S): 80 TABLET, FILM COATED ORAL at 22:00

## 2023-12-27 RX ADMIN — CLOZAPINE 200 MILLIGRAM(S): 150 TABLET, ORALLY DISINTEGRATING ORAL at 22:00

## 2023-12-27 RX ADMIN — Medication 2 MILLIGRAM(S): at 14:09

## 2023-12-27 RX ADMIN — LISINOPRIL 10 MILLIGRAM(S): 2.5 TABLET ORAL at 09:03

## 2023-12-27 RX ADMIN — Medication 3 MILLIGRAM(S): at 22:00

## 2023-12-27 RX ADMIN — DIVALPROEX SODIUM 500 MILLIGRAM(S): 500 TABLET, DELAYED RELEASE ORAL at 09:03

## 2023-12-27 NOTE — BH INPATIENT PSYCHIATRY PROGRESS NOTE - NSBHMETABOLIC_PSY_ALL_CORE_FT
BMI: BMI (kg/m2): 36.2 (12-08-23 @ 16:20)  HbA1c: A1C with Estimated Average Glucose Result: 6.8 % (12-09-23 @ 07:10)    Glucose:   BP: --Vital Signs Last 24 Hrs  T(C): 36.2 (12-27-23 @ 08:23), Max: 36.2 (12-27-23 @ 08:23)  T(F): 97.1 (12-27-23 @ 08:23), Max: 97.1 (12-27-23 @ 08:23)  HR: --  BP: --  BP(mean): --  RR: 16 (12-27-23 @ 08:23) (16 - 16)  SpO2: 98% (12-27-23 @ 08:23) (98% - 98%)    Orthostatic VS  12-27-23 @ 08:23  Lying BP: --/-- HR: --  Sitting BP: 141/90 HR: 91  Standing BP: 140/88 HR: 92  Site: upper right arm  Mode: electronic  Orthostatic VS  12-26-23 @ 08:48  Lying BP: --/-- HR: --  Sitting BP: 135/94 HR: 91  Standing BP: 126/74 HR: 98  Site: upper right arm  Mode: electronic  Orthostatic VS  12-25-23 @ 20:56  Lying BP: --/-- HR: --  Sitting BP: 146/80 HR: 108  Standing BP: 113/79 HR: 115  Site: --  Mode: electronic    Lipid Panel: Date/Time: 12-09-23 @ 07:10  Cholesterol, Serum: 131  LDL Cholesterol Calculated: 69  HDL Cholesterol, Serum: 29  Total Cholesterol/HDL Ration Measurement: --  Triglycerides, Serum: 195

## 2023-12-27 NOTE — BH INPATIENT PSYCHIATRY PROGRESS NOTE - NSBHATTESTBILLING_PSY_A_CORE
96668-Rgnezuvbbr OBS or IP - low complexity OR 25-34 mins 47500-Klgduppnkq OBS or IP - low complexity OR 25-34 mins

## 2023-12-27 NOTE — BH INPATIENT PSYCHIATRY PROGRESS NOTE - NSBHCHARTREVIEWVS_PSY_A_CORE FT
Vital Signs Last 24 Hrs  T(C): 36.2 (12-27-23 @ 08:23), Max: 36.2 (12-27-23 @ 08:23)  T(F): 97.1 (12-27-23 @ 08:23), Max: 97.1 (12-27-23 @ 08:23)  HR: --  BP: --  BP(mean): --  RR: 16 (12-27-23 @ 08:23) (16 - 16)  SpO2: 98% (12-27-23 @ 08:23) (98% - 98%)    Orthostatic VS  12-27-23 @ 08:23  Lying BP: --/-- HR: --  Sitting BP: 141/90 HR: 91  Standing BP: 140/88 HR: 92  Site: upper right arm  Mode: electronic  Orthostatic VS  12-26-23 @ 08:48  Lying BP: --/-- HR: --  Sitting BP: 135/94 HR: 91  Standing BP: 126/74 HR: 98  Site: upper right arm  Mode: electronic  Orthostatic VS  12-25-23 @ 20:56  Lying BP: --/-- HR: --  Sitting BP: 146/80 HR: 108  Standing BP: 113/79 HR: 115  Site: --  Mode: electronic

## 2023-12-27 NOTE — BH INPATIENT PSYCHIATRY PROGRESS NOTE - NSBHFUPINTERVALHXFT_PSY_A_CORE
12/27/23  Patient continues spending more time in his bed.  He continues to denies being depressed and anxious.  He continues denying to hearing voices or seeing things..  At this time he denies also any thoughts of harming self or anybody else and denies any paranoid thinking.    Patient reports that he is compliant with medication and denies skipping them.  However he asks for his Clozaril to be increased even though he denies having any symptoms to warrant its increase.

## 2023-12-28 PROCEDURE — 99232 SBSQ HOSP IP/OBS MODERATE 35: CPT

## 2023-12-28 RX ORDER — FLUPHENAZINE HYDROCHLORIDE 1 MG/1
25 TABLET, FILM COATED ORAL ONCE
Refills: 0 | Status: COMPLETED | OUTPATIENT
Start: 2023-12-28 | End: 2023-12-28

## 2023-12-28 RX ADMIN — METFORMIN HYDROCHLORIDE 1000 MILLIGRAM(S): 850 TABLET ORAL at 20:51

## 2023-12-28 RX ADMIN — Medication 2 MILLIGRAM(S): at 20:51

## 2023-12-28 RX ADMIN — Medication 3 MILLIGRAM(S): at 20:51

## 2023-12-28 RX ADMIN — FLUPHENAZINE HYDROCHLORIDE 25 MILLIGRAM(S): 1 TABLET, FILM COATED ORAL at 17:28

## 2023-12-28 RX ADMIN — ATORVASTATIN CALCIUM 40 MILLIGRAM(S): 80 TABLET, FILM COATED ORAL at 20:51

## 2023-12-28 RX ADMIN — Medication 1 PATCH: at 10:37

## 2023-12-28 RX ADMIN — CLOZAPINE 200 MILLIGRAM(S): 150 TABLET, ORALLY DISINTEGRATING ORAL at 20:51

## 2023-12-28 RX ADMIN — Medication 1 PATCH: at 17:35

## 2023-12-28 RX ADMIN — DIVALPROEX SODIUM 500 MILLIGRAM(S): 500 TABLET, DELAYED RELEASE ORAL at 10:37

## 2023-12-28 RX ADMIN — LISINOPRIL 10 MILLIGRAM(S): 2.5 TABLET ORAL at 10:37

## 2023-12-28 NOTE — BH INPATIENT PSYCHIATRY PROGRESS NOTE - NSBHATTESTBILLING_PSY_A_CORE
00760-Kxdimygeak OBS or IP - low complexity OR 25-34 mins 61608-Pfyayicdna OBS or IP - low complexity OR 25-34 mins

## 2023-12-28 NOTE — BH TREATMENT PLAN - NSTXPSYCHOINTERMD_PSY_ALL_CORE
SAD-On Prolixin Decanoate 25 mg IM/3 weeks, last received on 12/28/2023, and on Clozapine 200 mg HS trial-Still Symptomatic.
SAD-On Prolixin Decanoate 25 mg IM/3 weeks, last received on 12/06/2023, and on Clozapine 150 mg HS trial-Still Symptomatic.
SAD-On Prolixin Decanoate 25 mg IM/3 weeks, last received on 12/06/2023, and on Clozapine 50 mg HS trial-Still Symptomatic.

## 2023-12-28 NOTE — BH TREATMENT PLAN - NSTXPLANTHERAPYSESSIONSFT_PSY_ALL_CORE
12-22-23  --  --  --  --  --  Therapy Summary: Patient encouraged to participate in psych rehab groups but declined.    12-26-23  Type of therapy: Leisure development  Type of session: Group  Level of patient participation: Participates  Duration of participation: 60 minutes  Therapy conducted by: Psych rehab  Therapy Summary: Patient attended recreational programming today.

## 2023-12-28 NOTE — BH INPATIENT PSYCHIATRY PROGRESS NOTE - NSBHMETABOLIC_PSY_ALL_CORE_FT
BMI: BMI (kg/m2): 36.2 (12-08-23 @ 16:20)  HbA1c: A1C with Estimated Average Glucose Result: 6.8 % (12-09-23 @ 07:10)  TSH-1.49    Glucose:   BP: --Vital Signs Last 24 Hrs  T(C): 36.3 (12-28-23 @ 08:53), Max: 36.8 (12-27-23 @ 20:40)  T(F): 97.4 (12-28-23 @ 08:53), Max: 98.3 (12-27-23 @ 20:40)  HR: --  BP: --  BP(mean): --  RR: 16 (12-28-23 @ 08:53) (16 - 16)  SpO2: 100% (12-28-23 @ 08:53) (100% - 100%)    Orthostatic VS  12-28-23 @ 08:53  Lying BP: --/-- HR: --  Sitting BP: 127/85 HR: 89  Standing BP: 121/82 HR: 93  Site: upper right arm  Mode: electronic  Orthostatic VS  12-27-23 @ 20:40  Lying BP: --/-- HR: --  Sitting BP: 137/92 HR: 102  Standing BP: 146/88 HR: 107  Site: --  Mode: --  Orthostatic VS  12-27-23 @ 08:23  Lying BP: --/-- HR: --  Sitting BP: 141/90 HR: 91  Standing BP: 140/88 HR: 92  Site: upper right arm  Mode: electronic    Lipid Panel: Date/Time: 12-09-23 @ 07:10  Cholesterol, Serum: 131  LDL Cholesterol Calculated: 69  HDL Cholesterol, Serum: 29  Total Cholesterol/HDL Ration Measurement: --  Triglycerides, Serum: 195

## 2023-12-28 NOTE — BH TREATMENT PLAN - NSTXCOPEINTERMD_PSY_ALL_CORE
SAD-On Prolixin Decanoate 25 mg IM/3 weeks, last received on 12/06/2023, and on Clozapine 50 mg HS trial-Still Symptomatic.
SAD-On Prolixin Decanoate 25 mg IM/3 weeks, last received on 12/06/2023, and on Clozapine 150 mg HS trial-Still Symptomatic.
SAD-On Prolixin Decanoate 25 mg IM/3 weeks, last received on 12/28/2023, and on Clozapine 200 mg HS trial-Still Symptomatic.

## 2023-12-28 NOTE — BH TREATMENT PLAN - NSTXPATIENTPARTICIPATE_PSY_ALL_CORE
Patient participated in development of after care plan

## 2023-12-28 NOTE — BH INPATIENT PSYCHIATRY PROGRESS NOTE - NSBHASSESSSUMMFT_PSY_ALL_CORE
48 year old single, domic[place], unemployed, on disability. PPHx of schizophrenia, anxiety multiple prior admissions last in 2012 at [facility]. Was previously on Clozaril 50 mg, doing well aside from feeling tired, changed psychiatrist in July and is now on Depakote and Seroquel, reports symptoms have worsened on this regimen. PMHx of HTN, HDL and restless leg who self presents to hospital for psychiatric evaluation.  Patient presents with Schizoaffective disorder with exacerbation of symptoms since recent medication change. No SI/HI. +AH, not command in nature. +Vague paranoia and delusions. These symptoms represent a change from baseline from which the patient cannot be reasonably expected to improve with current level of care. The patient is requesting voluntary inpatient psychiatric hospitalization for safety and stabilization of psychiatric symptoms.  h/o schizophrenia vs schizoaffective d/o    Plan:  Continue current meds:   cloZAPine 200 milliGRAM(s) Oral at bedtime  divalproex  milliGRAM(s) Oral daily  melatonin 3 milliGRAM(s) Oral at bedtime  nicotine - 21 mG/24Hr(s) Patch 1 Patch Transdermal daily  NEW PROBLEMS: COMPLIANCE ISSUES    VPA level IS 14 Suggest that he is not swallowing his typical.  Clozaril level Is 218 and    48 year old single, domiciled [place], unemployed, on disability. PPHx of schizophrenia, anxiety multiple prior admissions last in 2012 at [facility]. Was previously on Clozaril 50 mg, doing well aside from feeling tired, changed psychiatrist in July and is now on Depakote and Seroquel, reports symptoms have worsened on this regimen. PMHx of HTN, HDL and restless leg who self presents to hospital for psychiatric evaluation.  Patient presents with Schizoaffective disorder with exacerbation of symptoms since recent medication change. No SI/HI. +AH, not command in nature. +Vague paranoia and delusions. These symptoms represent a change from baseline from which the patient cannot be reasonably expected to improve with current level of care. The patient is requesting voluntary inpatient psychiatric hospitalization for safety and stabilization of psychiatric symptoms.  h/o schizophrenia vs schizoaffective d/o    Plan:  Continue current meds:   cloZAPine 200 milliGRAM(s) Oral at bedtime  divalproex  milliGRAM(s) Oral daily  melatonin 3 milliGRAM(s) Oral at bedtime  nicotine - 21 mG/24Hr(s) Patch 1 Patch Transdermal daily  NEW PROBLEMS: COMPLIANCE ISSUES    VPA level IS 14 Suggest that he is not swallowing his typical.  Clozaril level Is 218 and

## 2023-12-28 NOTE — BH TREATMENT PLAN - NSTXDCOTHRINTERMD_PSY_ALL_CORE
SAD-On Prolixin Decanoate 25 mg IM/3 weeks, last received on 12/28/2023, and on Clozapine 200 mg HS trial-Still Symptomatic.
SAD-On Prolixin Decanoate 25 mg IM/3 weeks, last received on 12/06/2023, and on Clozapine 50 mg HS trial-Still Symptomatic.
SAD-On Prolixin Decanoate 25 mg IM/3 weeks, last received on 12/06/2023, and on Clozapine 150 mg HS trial-Still Symptomatic.

## 2023-12-28 NOTE — BH INPATIENT PSYCHIATRY PROGRESS NOTE - NSBHFUPINTERVALHXFT_PSY_A_CORE
12/27/23: Patient was seen today AM, chart reviewed and discussed in team. He is visible in AM, prefers to stay in bed at times, endorses limited T. Insertion or T. Withdrawal and is concerned and at the same time he added that he has almost negative Pedophilic believes now. He is due for Prolixin Decanoate 25 mg IM today 12/28/2023.     Patient continues spending more time in his bed.  He continues to denies being depressed and anxious.  He continues denying to hearing voices or seeing things..  At this time he denies also any thoughts of harming self or anybody else and denies any paranoid thinking.    Patient reports that he is compliant with medication and denies skipping them.  However he asks for his Clozaril to be increased even though he denies having any symptoms to warrant its increase.

## 2023-12-28 NOTE — BH TREATMENT PLAN - NSTXCOPEINTERPR_PSY_ALL_CORE
Continue to encourage patient to actively participate in 1-2 psych rehab groups daily to improve coping skills.
Encourage patient to actively participate in 1-2 psych rehab groups daily to improve coping skills.
Continue to encourage patient to actively participate in 1-2 psych rehab groups daily to improve coping skills.

## 2023-12-28 NOTE — BH INPATIENT PSYCHIATRY PROGRESS NOTE - NSBHCHARTREVIEWVS_PSY_A_CORE FT
Vital Signs Last 24 Hrs  T(C): 36.3 (12-28-23 @ 08:53), Max: 36.8 (12-27-23 @ 20:40)  T(F): 97.4 (12-28-23 @ 08:53), Max: 98.3 (12-27-23 @ 20:40)  HR: --  BP: --  BP(mean): --  RR: 16 (12-28-23 @ 08:53) (16 - 16)  SpO2: 100% (12-28-23 @ 08:53) (100% - 100%)    Orthostatic VS  12-28-23 @ 08:53  Lying BP: --/-- HR: --  Sitting BP: 127/85 HR: 89  Standing BP: 121/82 HR: 93  Site: upper right arm  Mode: electronic  Orthostatic VS  12-27-23 @ 20:40  Lying BP: --/-- HR: --  Sitting BP: 137/92 HR: 102  Standing BP: 146/88 HR: 107  Site: --  Mode: --  Orthostatic VS  12-27-23 @ 08:23  Lying BP: --/-- HR: --  Sitting BP: 141/90 HR: 91  Standing BP: 140/88 HR: 92  Site: upper right arm  Mode: electronic

## 2023-12-28 NOTE — BH TREATMENT PLAN - NSTXPSYCHOPROGRES_PSY_ALL_CORE
Improving
Improving
Dorsal Nasal Flap Text: The defect edges were debeveled with a #15 scalpel blade.  Given the location of the defect and the proximity to free margins a dorsal nasal flap was deemed most appropriate.  Using a sterile surgical marker, an appropriate dorsal nasal flap was drawn around the defect.    The area thus outlined was incised deep to adipose tissue with a #15 scalpel blade.  The skin margins were undermined to an appropriate distance in all directions utilizing iris scissors.

## 2023-12-28 NOTE — BH TREATMENT PLAN - NSDCCRITERIA_PSY_ALL_CORE
There would be marked reduction of psychotic symptoms.

## 2023-12-28 NOTE — BH TREATMENT PLAN - NSTXPSYCHOGOAL_PSY_ALL_CORE
Will identify 2 coping skills that assist with focus on reality
Will be able to report experiencing hallucinations to staff
Will identify 2 coping skills that assist with focus on reality

## 2023-12-28 NOTE — BH INPATIENT PSYCHIATRY PROGRESS NOTE - CURRENT MEDICATION
MEDICATIONS  (STANDING):  atorvastatin 40 milliGRAM(s) Oral at bedtime  cloZAPine 200 milliGRAM(s) Oral at bedtime  divalproex  milliGRAM(s) Oral daily  fluPHENAZine decanoate Injectable, Long Acting 25 milliGRAM(s) IntraMuscular once  influenza   Vaccine 0.5 milliLiter(s) IntraMuscular once  lisinopril 10 milliGRAM(s) Oral daily  melatonin 3 milliGRAM(s) Oral at bedtime  metFORMIN 1000 milliGRAM(s) Oral at bedtime  nicotine - 21 mG/24Hr(s) Patch 1 Patch Transdermal daily    MEDICATIONS  (PRN):  acetaminophen     Tablet .. 650 milliGRAM(s) Oral every 6 hours PRN Moderate Pain (4 - 6)  hydrOXYzine hydrochloride 25 milliGRAM(s) Oral every 6 hours PRN Anxiety  nicotine  Polacrilex Gum 2 milliGRAM(s) Oral every 2 hours PRN NRT  nicotine  Polacrilex Gum 2 milliGRAM(s) Oral every 4 hours PRN nicotine withdrawal

## 2023-12-28 NOTE — BH TREATMENT PLAN - NSTXDCOTHRGOAL_PSY_ALL_CORE
Patient will be referred to the appropriate level of outpatient treatment and have appointments within 3-5 days of discharge.  Patient will be discharged to safe housing either back home or DSS emergency housing.  Benefits in place   will explore need for dual diagnosis tx with appointments if needed.
Patient will be referred to the appropriate level of outpatient treatment and have appointments within 3-5 days of discharge.Patient will be discharged to safe housing either back home or DSS emergency housing.Benefits in place.  will explore need for dual diagnosis tx with appointments if needed.
Patient will be referred to the appropriate level of outpatient treatment and have appointments within 3-5 days of discharge.Patient will be discharged to safe housing either back home or DSS emergency housing.Benefits in place.  will explore need for dual diagnosis tx with appointments if needed.

## 2023-12-28 NOTE — BH TREATMENT PLAN - NSTXDCFAMINTERSW_PSY_ALL_CORE
Health Maintenance Due   Topic Date Due   • Shingles Vaccine (1 of 2) Never done   • Traditional Medicare- Medicare Wellness Visit  06/29/2022   • Depression Screening  06/29/2022          Since pt's admission, pt has been agreeable for SW to contact his supports including housing contact [place], intensive  [place], and his treatment team at [facility].

## 2023-12-28 NOTE — BH TREATMENT PLAN - NSTXDCOTHRINTERSW_PSY_ALL_CORE
LARON spoke with pt's therapist Trinity at [facility] in [place] as pt previously provided consent. Case discussed. Update provided. SW to continue to follow. LARON spoke with pt's therapist Trinity at [facility] in [place] as pt previously provided consent. Case discussed. Update provided. SW to continue to follow.

## 2023-12-29 LAB
BASOPHILS # BLD AUTO: 0.04 K/UL — SIGNIFICANT CHANGE UP (ref 0–0.2)
BASOPHILS # BLD AUTO: 0.04 K/UL — SIGNIFICANT CHANGE UP (ref 0–0.2)
BASOPHILS NFR BLD AUTO: 0.6 % — SIGNIFICANT CHANGE UP (ref 0–2)
BASOPHILS NFR BLD AUTO: 0.6 % — SIGNIFICANT CHANGE UP (ref 0–2)
EOSINOPHIL # BLD AUTO: 0.09 K/UL — SIGNIFICANT CHANGE UP (ref 0–0.5)
EOSINOPHIL # BLD AUTO: 0.09 K/UL — SIGNIFICANT CHANGE UP (ref 0–0.5)
EOSINOPHIL NFR BLD AUTO: 1.3 % — SIGNIFICANT CHANGE UP (ref 0–6)
EOSINOPHIL NFR BLD AUTO: 1.3 % — SIGNIFICANT CHANGE UP (ref 0–6)
HCT VFR BLD CALC: 42.3 % — SIGNIFICANT CHANGE UP (ref 39–50)
HCT VFR BLD CALC: 42.3 % — SIGNIFICANT CHANGE UP (ref 39–50)
HGB BLD-MCNC: 14.6 G/DL — SIGNIFICANT CHANGE UP (ref 13–17)
HGB BLD-MCNC: 14.6 G/DL — SIGNIFICANT CHANGE UP (ref 13–17)
IMM GRANULOCYTES NFR BLD AUTO: 0.3 % — SIGNIFICANT CHANGE UP (ref 0–0.9)
IMM GRANULOCYTES NFR BLD AUTO: 0.3 % — SIGNIFICANT CHANGE UP (ref 0–0.9)
LYMPHOCYTES # BLD AUTO: 2.03 K/UL — SIGNIFICANT CHANGE UP (ref 1–3.3)
LYMPHOCYTES # BLD AUTO: 2.03 K/UL — SIGNIFICANT CHANGE UP (ref 1–3.3)
LYMPHOCYTES # BLD AUTO: 29.6 % — SIGNIFICANT CHANGE UP (ref 13–44)
LYMPHOCYTES # BLD AUTO: 29.6 % — SIGNIFICANT CHANGE UP (ref 13–44)
MCHC RBC-ENTMCNC: 30 PG — SIGNIFICANT CHANGE UP (ref 27–34)
MCHC RBC-ENTMCNC: 30 PG — SIGNIFICANT CHANGE UP (ref 27–34)
MCHC RBC-ENTMCNC: 34.5 GM/DL — SIGNIFICANT CHANGE UP (ref 32–36)
MCHC RBC-ENTMCNC: 34.5 GM/DL — SIGNIFICANT CHANGE UP (ref 32–36)
MCV RBC AUTO: 86.9 FL — SIGNIFICANT CHANGE UP (ref 80–100)
MCV RBC AUTO: 86.9 FL — SIGNIFICANT CHANGE UP (ref 80–100)
MONOCYTES # BLD AUTO: 0.39 K/UL — SIGNIFICANT CHANGE UP (ref 0–0.9)
MONOCYTES # BLD AUTO: 0.39 K/UL — SIGNIFICANT CHANGE UP (ref 0–0.9)
MONOCYTES NFR BLD AUTO: 5.7 % — SIGNIFICANT CHANGE UP (ref 2–14)
MONOCYTES NFR BLD AUTO: 5.7 % — SIGNIFICANT CHANGE UP (ref 2–14)
NEUTROPHILS # BLD AUTO: 4.29 K/UL — SIGNIFICANT CHANGE UP (ref 1.8–7.4)
NEUTROPHILS # BLD AUTO: 4.29 K/UL — SIGNIFICANT CHANGE UP (ref 1.8–7.4)
NEUTROPHILS NFR BLD AUTO: 62.5 % — SIGNIFICANT CHANGE UP (ref 43–77)
NEUTROPHILS NFR BLD AUTO: 62.5 % — SIGNIFICANT CHANGE UP (ref 43–77)
PLATELET # BLD AUTO: 256 K/UL — SIGNIFICANT CHANGE UP (ref 150–400)
PLATELET # BLD AUTO: 256 K/UL — SIGNIFICANT CHANGE UP (ref 150–400)
RBC # BLD: 4.87 M/UL — SIGNIFICANT CHANGE UP (ref 4.2–5.8)
RBC # BLD: 4.87 M/UL — SIGNIFICANT CHANGE UP (ref 4.2–5.8)
RBC # FLD: 12.7 % — SIGNIFICANT CHANGE UP (ref 10.3–14.5)
RBC # FLD: 12.7 % — SIGNIFICANT CHANGE UP (ref 10.3–14.5)
WBC # BLD: 6.86 K/UL — SIGNIFICANT CHANGE UP (ref 3.8–10.5)
WBC # BLD: 6.86 K/UL — SIGNIFICANT CHANGE UP (ref 3.8–10.5)
WBC # FLD AUTO: 6.86 K/UL — SIGNIFICANT CHANGE UP (ref 3.8–10.5)
WBC # FLD AUTO: 6.86 K/UL — SIGNIFICANT CHANGE UP (ref 3.8–10.5)

## 2023-12-29 PROCEDURE — 99232 SBSQ HOSP IP/OBS MODERATE 35: CPT

## 2023-12-29 RX ADMIN — ATORVASTATIN CALCIUM 40 MILLIGRAM(S): 80 TABLET, FILM COATED ORAL at 20:48

## 2023-12-29 RX ADMIN — Medication 2 MILLIGRAM(S): at 20:52

## 2023-12-29 RX ADMIN — Medication 1 PATCH: at 09:53

## 2023-12-29 RX ADMIN — METFORMIN HYDROCHLORIDE 1000 MILLIGRAM(S): 850 TABLET ORAL at 20:50

## 2023-12-29 RX ADMIN — Medication 1 PATCH: at 14:11

## 2023-12-29 RX ADMIN — DIVALPROEX SODIUM 500 MILLIGRAM(S): 500 TABLET, DELAYED RELEASE ORAL at 09:53

## 2023-12-29 RX ADMIN — Medication 1 PATCH: at 18:22

## 2023-12-29 RX ADMIN — Medication 3 MILLIGRAM(S): at 20:50

## 2023-12-29 RX ADMIN — Medication 2 MILLIGRAM(S): at 09:53

## 2023-12-29 RX ADMIN — CLOZAPINE 200 MILLIGRAM(S): 150 TABLET, ORALLY DISINTEGRATING ORAL at 20:50

## 2023-12-29 RX ADMIN — LISINOPRIL 10 MILLIGRAM(S): 2.5 TABLET ORAL at 09:53

## 2023-12-29 NOTE — BH INPATIENT PSYCHIATRY PROGRESS NOTE - NSBHCHARTREVIEWVS_PSY_A_CORE FT
Vital Signs Last 24 Hrs  T(C): 36.4 (12-29-23 @ 07:25), Max: 36.4 (12-29-23 @ 07:25)  T(F): 97.5 (12-29-23 @ 07:25), Max: 97.5 (12-29-23 @ 07:25)  HR: --  BP: --  BP(mean): --  RR: 18 (12-29-23 @ 07:25) (15 - 18)  SpO2: 100% (12-29-23 @ 07:25) (100% - 100%)    Orthostatic VS  12-29-23 @ 07:25  Lying BP: --/-- HR: --  Sitting BP: 135/90 HR: 88  Standing BP: 115/85 HR: 91  Site: upper right arm  Mode: electronic  Orthostatic VS  12-28-23 @ 20:30  Lying BP: --/-- HR: --  Sitting BP: 130/80 HR: 103  Standing BP: 134/88 HR: 101  Site: upper left arm  Mode: electronic  Orthostatic VS  12-28-23 @ 08:53  Lying BP: --/-- HR: --  Sitting BP: 127/85 HR: 89  Standing BP: 121/82 HR: 93  Site: upper right arm  Mode: electronic  Orthostatic VS  12-27-23 @ 20:40  Lying BP: --/-- HR: --  Sitting BP: 137/92 HR: 102  Standing BP: 146/88 HR: 107  Site: --  Mode: --

## 2023-12-29 NOTE — BH INPATIENT PSYCHIATRY PROGRESS NOTE - NSBHFUPINTERVALHXFT_PSY_A_CORE
12/28/2023: Patient was seen today AM, chart reviewed and discussed in team. He has improved mood, endorses that he has decreased belief of the pedophile now, and it does not annoy him anymore, he added that the T. Insertion is also less or gone and has been meds compliant with adequate serum levels of Absolute count and has no Clozapine induced s/e till now. Clozapine level done is 218. Even on lower S. level of Clozapine the symptoms have improved considerably so he does not need to increase the Clozapine dose any further for now.  Discharge planning after new years.     12/28/23: Patient was seen today AM, chart reviewed and discussed in team. He is visible in AM, prefers to stay in bed at times, endorses limited T. Insertion or T. Withdrawal and is concerned and at the same time he added that he has almost negative Pedophilic believes now. He is due for Prolixin Decanoate 25 mg IM today 12/28/2023.     Patient continues spending more time in his bed.  He continues to denies being depressed and anxious.  He continues denying to hearing voices or seeing things..  At this time he denies also any thoughts of harming self or anybody else and denies any paranoid thinking.    Patient reports that he is compliant with medication and denies skipping them.  However he asks for his Clozaril to be increased even though he denies having any symptoms to warrant its increase.

## 2023-12-29 NOTE — BH INPATIENT PSYCHIATRY PROGRESS NOTE - NSBHMETABOLIC_PSY_ALL_CORE_FT
BMI: BMI (kg/m2): 36.2 (12-08-23 @ 16:20)  HbA1c: A1C with Estimated Average Glucose Result: 6.8 % (12-09-23 @ 07:10)  TSH-1.49    Glucose:   BP: --Vital Signs Last 24 Hrs  T(C): 36.4 (12-29-23 @ 07:25), Max: 36.4 (12-29-23 @ 07:25)  T(F): 97.5 (12-29-23 @ 07:25), Max: 97.5 (12-29-23 @ 07:25)  HR: --  BP: --  BP(mean): --  RR: 18 (12-29-23 @ 07:25) (15 - 18)  SpO2: 100% (12-29-23 @ 07:25) (100% - 100%)    Orthostatic VS  12-29-23 @ 07:25  Lying BP: --/-- HR: --  Sitting BP: 135/90 HR: 88  Standing BP: 115/85 HR: 91  Site: upper right arm  Mode: electronic  Orthostatic VS  12-28-23 @ 20:30  Lying BP: --/-- HR: --  Sitting BP: 130/80 HR: 103  Standing BP: 134/88 HR: 101  Site: upper left arm  Mode: electronic  Orthostatic VS  12-28-23 @ 08:53  Lying BP: --/-- HR: --  Sitting BP: 127/85 HR: 89  Standing BP: 121/82 HR: 93  Site: upper right arm  Mode: electronic  Orthostatic VS  12-27-23 @ 20:40  Lying BP: --/-- HR: --  Sitting BP: 137/92 HR: 102  Standing BP: 146/88 HR: 107  Site: --  Mode: --    Lipid Panel: Date/Time: 12-09-23 @ 07:10  Cholesterol, Serum: 131  LDL Cholesterol Calculated: 69  HDL Cholesterol, Serum: 29  Total Cholesterol/HDL Ration Measurement: --  Triglycerides, Serum: 195

## 2023-12-29 NOTE — BH INPATIENT PSYCHIATRY PROGRESS NOTE - CURRENT MEDICATION
MEDICATIONS  (STANDING):  atorvastatin 40 milliGRAM(s) Oral at bedtime  cloZAPine 200 milliGRAM(s) Oral at bedtime  divalproex  milliGRAM(s) Oral daily  influenza   Vaccine 0.5 milliLiter(s) IntraMuscular once  lisinopril 10 milliGRAM(s) Oral daily  melatonin 3 milliGRAM(s) Oral at bedtime  metFORMIN 1000 milliGRAM(s) Oral at bedtime  nicotine - 21 mG/24Hr(s) Patch 1 Patch Transdermal daily    MEDICATIONS  (PRN):  acetaminophen     Tablet .. 650 milliGRAM(s) Oral every 6 hours PRN Moderate Pain (4 - 6)  hydrOXYzine hydrochloride 25 milliGRAM(s) Oral every 6 hours PRN Anxiety  nicotine  Polacrilex Gum 2 milliGRAM(s) Oral every 4 hours PRN nicotine withdrawal  nicotine  Polacrilex Gum 2 milliGRAM(s) Oral every 2 hours PRN NRT

## 2023-12-29 NOTE — BH INPATIENT PSYCHIATRY PROGRESS NOTE - NSBHASSESSSUMMFT_PSY_ALL_CORE
48 year old single, dom[place], unemployed, on disability. PPHx of schizophrenia, anxiety multiple prior admissions last in 2012 at [facility]. Was previously on Clozaril 50 mg, doing well aside from feeling tired, changed psychiatrist in July and is now on Depakote and Seroquel, reports symptoms have worsened on this regimen. PMHx of HTN, HDL and restless leg who self presents to hospital for psychiatric evaluation.  Patient presents with Schizoaffective disorder with exacerbation of symptoms since recent medication change. No SI/HI. +AH, not command in nature. +Vague paranoia and delusions. These symptoms represent a change from baseline from which the patient cannot be reasonably expected to improve with current level of care. The patient is requesting voluntary inpatient psychiatric hospitalization for safety and stabilization of psychiatric symptoms.  h/o schizophrenia vs schizoaffective d/o    Plan:  Continue current meds:   cloZAPine 200 milliGRAM(s) Oral at bedtime  divalproex  milliGRAM(s) Oral daily  melatonin 3 milliGRAM(s) Oral at bedtime  nicotine - 21 mG/24Hr(s) Patch 1 Patch Transdermal daily  NEW PROBLEMS: COMPLIANCE ISSUES    VPA level IS 14 Suggest that he is not swallowing his typical.  Clozaril level Is 218 and    48 year old single, domiciled [place], unemployed, on disability. PPHx of schizophrenia, anxiety multiple prior admissions last in 2012 at [facility]. Was previously on Clozaril 50 mg, doing well aside from feeling tired, changed psychiatrist in July and is now on Depakote and Seroquel, reports symptoms have worsened on this regimen. PMHx of HTN, HDL and restless leg who self presents to hospital for psychiatric evaluation.  Patient presents with Schizoaffective disorder with exacerbation of symptoms since recent medication change. No SI/HI. +AH, not command in nature. +Vague paranoia and delusions. These symptoms represent a change from baseline from which the patient cannot be reasonably expected to improve with current level of care. The patient is requesting voluntary inpatient psychiatric hospitalization for safety and stabilization of psychiatric symptoms.  h/o schizophrenia vs schizoaffective d/o    Plan:  Continue current meds:   cloZAPine 200 milliGRAM(s) Oral at bedtime  divalproex  milliGRAM(s) Oral daily  melatonin 3 milliGRAM(s) Oral at bedtime  nicotine - 21 mG/24Hr(s) Patch 1 Patch Transdermal daily  NEW PROBLEMS: COMPLIANCE ISSUES    VPA level IS 14 Suggest that he is not swallowing his typical.  Clozaril level Is 218 and

## 2023-12-29 NOTE — BH INPATIENT PSYCHIATRY PROGRESS NOTE - NSBHATTESTBILLING_PSY_A_CORE
16563-Okqcaxlhxs OBS or IP - low complexity OR 25-34 mins 65609-Udroilbixv OBS or IP - low complexity OR 25-34 mins

## 2023-12-30 RX ADMIN — LISINOPRIL 10 MILLIGRAM(S): 2.5 TABLET ORAL at 11:21

## 2023-12-30 RX ADMIN — Medication 1 PATCH: at 11:21

## 2023-12-30 RX ADMIN — Medication 1 PATCH: at 11:13

## 2023-12-30 RX ADMIN — DIVALPROEX SODIUM 500 MILLIGRAM(S): 500 TABLET, DELAYED RELEASE ORAL at 11:20

## 2023-12-31 RX ADMIN — DIVALPROEX SODIUM 500 MILLIGRAM(S): 500 TABLET, DELAYED RELEASE ORAL at 09:57

## 2023-12-31 RX ADMIN — LISINOPRIL 10 MILLIGRAM(S): 2.5 TABLET ORAL at 09:57

## 2023-12-31 RX ADMIN — Medication 2 MILLIGRAM(S): at 21:15

## 2023-12-31 RX ADMIN — Medication 3 MILLIGRAM(S): at 21:15

## 2023-12-31 RX ADMIN — CLOZAPINE 200 MILLIGRAM(S): 150 TABLET, ORALLY DISINTEGRATING ORAL at 21:15

## 2023-12-31 RX ADMIN — ATORVASTATIN CALCIUM 40 MILLIGRAM(S): 80 TABLET, FILM COATED ORAL at 21:15

## 2023-12-31 RX ADMIN — Medication 1 PATCH: at 18:04

## 2023-12-31 RX ADMIN — Medication 1 PATCH: at 09:57

## 2024-01-01 RX ADMIN — Medication 3 MILLIGRAM(S): at 20:49

## 2024-01-01 RX ADMIN — Medication 2 MILLIGRAM(S): at 09:57

## 2024-01-01 RX ADMIN — LISINOPRIL 10 MILLIGRAM(S): 2.5 TABLET ORAL at 09:55

## 2024-01-01 RX ADMIN — Medication 1 PATCH: at 16:59

## 2024-01-01 RX ADMIN — Medication 1 PATCH: at 09:55

## 2024-01-01 RX ADMIN — Medication 2 MILLIGRAM(S): at 12:36

## 2024-01-01 RX ADMIN — METFORMIN HYDROCHLORIDE 1000 MILLIGRAM(S): 850 TABLET ORAL at 20:49

## 2024-01-01 RX ADMIN — DIVALPROEX SODIUM 500 MILLIGRAM(S): 500 TABLET, DELAYED RELEASE ORAL at 09:55

## 2024-01-01 RX ADMIN — METFORMIN HYDROCHLORIDE 1000 MILLIGRAM(S): 850 TABLET ORAL at 01:22

## 2024-01-01 RX ADMIN — CLOZAPINE 200 MILLIGRAM(S): 150 TABLET, ORALLY DISINTEGRATING ORAL at 20:48

## 2024-01-01 RX ADMIN — ATORVASTATIN CALCIUM 40 MILLIGRAM(S): 80 TABLET, FILM COATED ORAL at 20:50

## 2024-01-01 RX ADMIN — Medication 2 MILLIGRAM(S): at 20:49

## 2024-01-02 PROCEDURE — 99231 SBSQ HOSP IP/OBS SF/LOW 25: CPT

## 2024-01-02 RX ADMIN — Medication 2 MILLIGRAM(S): at 18:11

## 2024-01-02 RX ADMIN — DIVALPROEX SODIUM 500 MILLIGRAM(S): 500 TABLET, DELAYED RELEASE ORAL at 09:41

## 2024-01-02 RX ADMIN — ATORVASTATIN CALCIUM 40 MILLIGRAM(S): 80 TABLET, FILM COATED ORAL at 20:57

## 2024-01-02 RX ADMIN — Medication 2 MILLIGRAM(S): at 09:41

## 2024-01-02 RX ADMIN — CLOZAPINE 200 MILLIGRAM(S): 150 TABLET, ORALLY DISINTEGRATING ORAL at 20:57

## 2024-01-02 RX ADMIN — Medication 2 MILLIGRAM(S): at 13:01

## 2024-01-02 RX ADMIN — METFORMIN HYDROCHLORIDE 1000 MILLIGRAM(S): 850 TABLET ORAL at 20:57

## 2024-01-02 RX ADMIN — Medication 1 PATCH: at 10:07

## 2024-01-02 RX ADMIN — Medication 2 MILLIGRAM(S): at 20:58

## 2024-01-02 RX ADMIN — Medication 1 PATCH: at 09:40

## 2024-01-02 RX ADMIN — LISINOPRIL 10 MILLIGRAM(S): 2.5 TABLET ORAL at 09:41

## 2024-01-02 RX ADMIN — Medication 3 MILLIGRAM(S): at 20:58

## 2024-01-02 NOTE — BH INPATIENT PSYCHIATRY PROGRESS NOTE - NSBHATTESTBILLING_PSY_A_CORE
01170-Tsroyxjldi OBS or IP - moderate complexity OR 35-49 mins 24431-Mbdnjqqyaj OBS or IP - moderate complexity OR 35-49 mins

## 2024-01-02 NOTE — BH INPATIENT PSYCHIATRY PROGRESS NOTE - NSBHCHARTREVIEWVS_PSY_A_CORE FT
Vital Signs Last 24 Hrs  T(C): 36.3 (01-02-24 @ 08:07), Max: 36.3 (01-01-24 @ 21:00)  T(F): 97.3 (01-02-24 @ 08:07), Max: 97.3 (01-01-24 @ 21:00)  HR: --  BP: --  BP(mean): --  RR: 18 (01-02-24 @ 08:07) (18 - 18)  SpO2: 99% (01-02-24 @ 08:07) (99% - 99%)    Orthostatic VS  01-02-24 @ 08:07  Lying BP: --/-- HR: --  Sitting BP: 136/90 HR: 82  Standing BP: 126/85 HR: 91  Site: upper right arm  Mode: electronic  Orthostatic VS  01-01-24 @ 21:00  Lying BP: --/-- HR: --  Sitting BP: 115/83 HR: 102  Standing BP: 117/83 HR: 105  Site: --  Mode: electronic  Orthostatic VS  01-01-24 @ 08:12  Lying BP: --/-- HR: --  Sitting BP: 148/90 HR: 87  Standing BP: 150/83 HR: 90  Site: upper right arm  Mode: electronic  Orthostatic VS  12-31-23 @ 20:30  Lying BP: --/-- HR: --  Sitting BP: 131/82 HR: 102  Standing BP: 145/87 HR: 102  Site: --  Mode: --

## 2024-01-02 NOTE — BH INPATIENT PSYCHIATRY PROGRESS NOTE - NSBHFUPINTERVALHXFT_PSY_A_CORE
01/02/2024: Patient was seen today AM, chart reviewed and discussed in team. Mood improved, has a smile today AM and endorses that he feels better and would like to go home as he feels that he no longer has to stay here. He takes meds as prescribed. On Clozapine 200 mg HS with Prolixin Decanoate 25 mg IM/3 weeks, last received on 12/28/2023, next pos due on 01/18/2024. Absolute Count is 4.29, and has no meds induced s/.e till now e.g Drooling from Clozapine. Depakote level is 14. In good mood control now, no further meds titration needed. No S/H/I/P.    12/28/2023: Patient was seen today AM, chart reviewed and discussed in team. He has improved mood, endorses that he has decreased belief of the pedophile now, and it does not annoy him anymore, he added that the T. Insertion is also less or gone and has been meds compliant with adequate serum levels of Absolute count and has no Clozapine induced s/e till now. Clozapine level done is 218. Even on lower S. level of Clozapine the symptoms have improved considerably so he does not need to increase the Clozapine dose any further for now.  Discharge planning after new years.     12/28/23: Patient was seen today AM, chart reviewed and discussed in team. He is visible in AM, prefers to stay in bed at times, endorses limited T. Insertion or T. Withdrawal and is concerned and at the same time he added that he has almost negative Pedophilic believes now. He is due for Prolixin Decanoate 25 mg IM today 12/28/2023.     Patient continues spending more time in his bed.  He continues to denies being depressed and anxious.  He continues denying to hearing voices or seeing things..  At this time he denies also any thoughts of harming self or anybody else and denies any paranoid thinking.    Patient reports that he is compliant with medication and denies skipping them.  However he asks for his Clozaril to be increased even though he denies having any symptoms to warrant its increase.

## 2024-01-02 NOTE — BH INPATIENT PSYCHIATRY PROGRESS NOTE - NSBHASSESSSUMMFT_PSY_ALL_CORE
48 year old single, domiciled [place], unemployed, on disability. PPHx of schizophrenia, anxiety multiple prior admissions last in 2012 at [facility]. Was previously on Clozaril 50 mg, doing well aside from feeling tired, changed psychiatrist in July and is now on Depakote and Seroquel, reports symptoms have worsened on this regimen. PMHx of HTN, HDL and restless leg who self presents to hospital for psychiatric evaluation.  Patient presents with Schizoaffective disorder with exacerbation of symptoms since recent medication change. No SI/HI. +AH, not command in nature. +Vague paranoia and delusions. These symptoms represent a change from baseline from which the patient cannot be reasonably expected to improve with current level of care. The patient is requesting voluntary inpatient psychiatric hospitalization for safety and stabilization of psychiatric symptoms.  h/o schizophrenia vs schizoaffective d/o    Plan:  Continue current meds:   cloZAPine 200 milliGRAM(s) Oral at bedtime  divalproex  milliGRAM(s) Oral daily  melatonin 3 milliGRAM(s) Oral at bedtime  nicotine - 21 mG/24Hr(s) Patch 1 Patch Transdermal daily  NEW PROBLEMS: COMPLIANCE ISSUES    VPA level IS 14 Suggest that he is not swallowing his typical.  Clozaril level Is 218 and    48 year old single, domiciled [place], unemployed, on disability. PPHx of schizophrenia, anxiety multiple prior admissions last in 2012 at [facility]. Was previously on Clozaril 50 mg, doing well aside from feeling tired, changed psychiatrist in July and is now on Depakote and Seroquel, reports symptoms have worsened on this regimen. PMHx of HTN, HDL and restless leg who self presents to hospital for psychiatric evaluation.  Patient presents with Schizoaffective disorder with exacerbation of symptoms since recent medication change. No SI/HI. +AH, not command in nature. +Vague paranoia and delusions. These symptoms represent a change from baseline from which the patient cannot be reasonably expected to improve with current level of care. The patient is requesting voluntary inpatient psychiatric hospitalization for safety and stabilization of psychiatric symptoms.  h/o schizophrenia vs schizoaffective d/o    Plan:  Continue current meds:   cloZAPine 200 milliGRAM(s) Oral at bedtime  divalproex  milliGRAM(s) Oral daily  melatonin 3 milliGRAM(s) Oral at bedtime  nicotine - 21 mG/24Hr(s) Patch 1 Patch Transdermal daily  NEW PROBLEMS: COMPLIANCE ISSUES    VPA level IS 14 Suggest that he is not swallowing his typical.  Clozaril level Is 218 and

## 2024-01-02 NOTE — BH INPATIENT PSYCHIATRY PROGRESS NOTE - NSBHMETABOLIC_PSY_ALL_CORE_FT
BMI: BMI (kg/m2): 36.2 (12-08-23 @ 16:20)  HbA1c: A1C with Estimated Average Glucose Result: 6.8 % (12-09-23 @ 07:10)    Glucose:   BP: --Vital Signs Last 24 Hrs  T(C): 36.3 (01-02-24 @ 08:07), Max: 36.3 (01-01-24 @ 21:00)  T(F): 97.3 (01-02-24 @ 08:07), Max: 97.3 (01-01-24 @ 21:00)  HR: --  BP: --  BP(mean): --  RR: 18 (01-02-24 @ 08:07) (18 - 18)  SpO2: 99% (01-02-24 @ 08:07) (99% - 99%)    Orthostatic VS  01-02-24 @ 08:07  Lying BP: --/-- HR: --  Sitting BP: 136/90 HR: 82  Standing BP: 126/85 HR: 91  Site: upper right arm  Mode: electronic  Orthostatic VS  01-01-24 @ 21:00  Lying BP: --/-- HR: --  Sitting BP: 115/83 HR: 102  Standing BP: 117/83 HR: 105  Site: --  Mode: electronic  Orthostatic VS  01-01-24 @ 08:12  Lying BP: --/-- HR: --  Sitting BP: 148/90 HR: 87  Standing BP: 150/83 HR: 90  Site: upper right arm  Mode: electronic  Orthostatic VS  12-31-23 @ 20:30  Lying BP: --/-- HR: --  Sitting BP: 131/82 HR: 102  Standing BP: 145/87 HR: 102  Site: --  Mode: --    Lipid Panel: Date/Time: 12-09-23 @ 07:10  Cholesterol, Serum: 131  LDL Cholesterol Calculated: 69  HDL Cholesterol, Serum: 29  Total Cholesterol/HDL Ration Measurement: --  Triglycerides, Serum: 195

## 2024-01-03 PROCEDURE — 99231 SBSQ HOSP IP/OBS SF/LOW 25: CPT

## 2024-01-03 RX ADMIN — DIVALPROEX SODIUM 500 MILLIGRAM(S): 500 TABLET, DELAYED RELEASE ORAL at 08:46

## 2024-01-03 RX ADMIN — Medication 1 PATCH: at 08:46

## 2024-01-03 RX ADMIN — CLOZAPINE 200 MILLIGRAM(S): 150 TABLET, ORALLY DISINTEGRATING ORAL at 20:04

## 2024-01-03 RX ADMIN — LISINOPRIL 10 MILLIGRAM(S): 2.5 TABLET ORAL at 08:46

## 2024-01-03 RX ADMIN — Medication 1 PATCH: at 08:30

## 2024-01-03 RX ADMIN — ATORVASTATIN CALCIUM 40 MILLIGRAM(S): 80 TABLET, FILM COATED ORAL at 20:04

## 2024-01-03 RX ADMIN — Medication 3 MILLIGRAM(S): at 20:04

## 2024-01-03 RX ADMIN — Medication 2 MILLIGRAM(S): at 20:32

## 2024-01-03 RX ADMIN — METFORMIN HYDROCHLORIDE 1000 MILLIGRAM(S): 850 TABLET ORAL at 20:04

## 2024-01-03 NOTE — BH DISCHARGE NOTE NURSING/SOCIAL WORK/PSYCH REHAB - NSCDUDCCRISIS_PSY_A_CORE
.  [facility] – Crisis Care for Children, Adults and Families  [address] [place] [address]  Mobile Crisis Hotline – (513) 271-6715/.National Suicide Prevention Lifeline 0 (443) 743-1218/.  [facility]  1 (249) LIFENET (873-4352)/.  [facility]  (953) 485-9716/.  [facility] Response Crisis Hotline  (515) 173-5759  24 hour telephone crisis intervention and suicide prevention hotline concerned with all mental health issues/Other.../988 Suicide and Crisis Lifeline .  [facility] - [facility] – Crisis Care for Children, Adults and Families  [address] [place] [address]  Mobile Crisis Hotline – (697) 407-1871/.National Suicide Prevention Lifeline 8 (222) 444-9216/.  [facility]  1 (748) LIFENET (288-5232)/.  [facility]  (589) 778-4179/.  [facility] Response Crisis Hotline  (460) 366-5725  24 hour telephone crisis intervention and suicide prevention hotline concerned with all mental health issues/Other.../988 Suicide and Crisis Lifeline

## 2024-01-03 NOTE — BH DISCHARGE NOTE NURSING/SOCIAL WORK/PSYCH REHAB - PATIENT PORTAL LINK FT
You can access the FollowMyHealth Patient Portal offered by  by registering at the following website: [email or web address]. By joining [facility]’s FollowMyHealth portal, you will also be able to view your health information using other applications (apps) compatible with our system. You can access the FollowMyHealth Patient Portal offered by [facility] by registering at the following website: [email or web address]. By joining [facility]’s FollowMyHealth portal, you will also be able to view your health information using other applications (apps) compatible with our system.

## 2024-01-03 NOTE — BH DISCHARGE NOTE NURSING/SOCIAL WORK/PSYCH REHAB - NSDCCRNAME_GEN_ALL_CORE_FT
Marj NATH [facility] Intensive  Marj NATH [facility] Intensive

## 2024-01-03 NOTE — BH INPATIENT PSYCHIATRY PROGRESS NOTE - NSBHATTESTBILLING_PSY_A_CORE
90769-Cmyxiyurwt OBS or IP - low complexity OR 25-34 mins 39188-Zedjgdwemg OBS or IP - low complexity OR 25-34 mins

## 2024-01-03 NOTE — BH DISCHARGE NOTE NURSING/SOCIAL WORK/PSYCH REHAB - NSDCPEPAMP_GEN_ALL_CORE
“[facility] for Tobacco Control” pamphlet given “[facility] for Tobacco Control” pamphlet given

## 2024-01-03 NOTE — BH INPATIENT PSYCHIATRY PROGRESS NOTE - NSBHFUPINTERVALHXFT_PSY_A_CORE
01/03/2024: Patient seen today, no acute issues, aware that he is leaving tomorrow and need CBC tomorrow AM to continue Clozapine. He is also on Prolixin Decanoate 25 mg IM/3 weeks, last received on 12/28 and now due on 01/18/2024 for continued treatment and stability. CBC for tomorrow AM.    01/02/2024: Patient was seen today AM, chart reviewed and discussed in team. Mood improved, has a smile today AM and endorses that he feels better and would like to go home as he feels that he no longer has to stay here. He takes meds as prescribed. On Clozapine 200 mg HS with Prolixin Decanoate 25 mg IM/3 weeks, last received on 12/28/2023, next pos due on 01/18/2024. Absolute Count is 4.29, and has no meds induced s/.e till now e.g Drooling from Clozapine. Depakote level is 14. In good mood control now, no further meds titration needed. No S/H/I/P.    12/28/2023: Patient was seen today AM, chart reviewed and discussed in team. He has improved mood, endorses that he has decreased belief of the pedophile now, and it does not annoy him anymore, he added that the T. Insertion is also less or gone and has been meds compliant with adequate serum levels of Absolute count and has no Clozapine induced s/e till now. Clozapine level done is 218. Even on lower S. level of Clozapine the symptoms have improved considerably so he does not need to increase the Clozapine dose any further for now.  Discharge planning after new years.     12/28/23: Patient was seen today AM, chart reviewed and discussed in team. He is visible in AM, prefers to stay in bed at times, endorses limited T. Insertion or T. Withdrawal and is concerned and at the same time he added that he has almost negative Pedophilic believes now. He is due for Prolixin Decanoate 25 mg IM today 12/28/2023.     Patient continues spending more time in his bed.  He continues to denies being depressed and anxious.  He continues denying to hearing voices or seeing things..  At this time he denies also any thoughts of harming self or anybody else and denies any paranoid thinking.    Patient reports that he is compliant with medication and denies skipping them.  However he asks for his Clozaril to be increased even though he denies having any symptoms to warrant its increase.

## 2024-01-03 NOTE — BH INPATIENT PSYCHIATRY PROGRESS NOTE - NSBHASSESSSUMMFT_PSY_ALL_CORE
48 year old single, domic[place], unemployed, on disability. PPHx of schizophrenia, anxiety multiple prior admissions last in 2012 at [facility]. Was previously on Clozaril 50 mg, doing well aside from feeling tired, changed psychiatrist in July and is now on Depakote and Seroquel, reports symptoms have worsened on this regimen. PMHx of HTN, HDL and restless leg who self presents to hospital for psychiatric evaluation.  Patient presents with Schizoaffective disorder with exacerbation of symptoms since recent medication change. No SI/HI. +AH, not command in nature. +Vague paranoia and delusions. These symptoms represent a change from baseline from which the patient cannot be reasonably expected to improve with current level of care. The patient is requesting voluntary inpatient psychiatric hospitalization for safety and stabilization of psychiatric symptoms.  h/o schizophrenia vs schizoaffective d/o    Plan:  Continue current meds:   cloZAPine 200 milliGRAM(s) Oral at bedtime  divalproex  milliGRAM(s) Oral daily  melatonin 3 milliGRAM(s) Oral at bedtime  nicotine - 21 mG/24Hr(s) Patch 1 Patch Transdermal daily  NEW PROBLEMS: COMPLIANCE ISSUES    VPA level IS 14 Suggest that he is not swallowing his typical.  Clozaril level Is 218 and    48 year old single, domiciled [place], unemployed, on disability. PPHx of schizophrenia, anxiety multiple prior admissions last in 2012 at [facility]. Was previously on Clozaril 50 mg, doing well aside from feeling tired, changed psychiatrist in July and is now on Depakote and Seroquel, reports symptoms have worsened on this regimen. PMHx of HTN, HDL and restless leg who self presents to hospital for psychiatric evaluation.  Patient presents with Schizoaffective disorder with exacerbation of symptoms since recent medication change. No SI/HI. +AH, not command in nature. +Vague paranoia and delusions. These symptoms represent a change from baseline from which the patient cannot be reasonably expected to improve with current level of care. The patient is requesting voluntary inpatient psychiatric hospitalization for safety and stabilization of psychiatric symptoms.  h/o schizophrenia vs schizoaffective d/o    Plan:  Continue current meds:   cloZAPine 200 milliGRAM(s) Oral at bedtime  divalproex  milliGRAM(s) Oral daily  melatonin 3 milliGRAM(s) Oral at bedtime  nicotine - 21 mG/24Hr(s) Patch 1 Patch Transdermal daily  NEW PROBLEMS: COMPLIANCE ISSUES    VPA level IS 14 Suggest that he is not swallowing his typical.  Clozaril level Is 218 and

## 2024-01-03 NOTE — BH DISCHARGE NOTE NURSING/SOCIAL WORK/PSYCH REHAB - NSDCADDINFO1FT_PSY_ALL_CORE
You have an appointment in person at [facility] in [place] on 1/10/24 at 11 AM with Nishi.     Patient to address any issues related to substance abuse in treatment with [facility]. Additionally, [facility] in [place] with be coordinating with the [facility] in [place], where patient will be receiving treatment for substance abuse.  You have an appointment in person at [facility] in [place] on 1/10/24 at 11 AM with Nishi.     Patient to address any issues related to substance abuse in treatment with [facility]. Additionally, [facility] in [place] with be coordinating with the [facility] in [place], where patient will be receiving treatment for substance abuse.  You have an appointment in person at [facility] in [place] on 1/10/24 at 11 AM with Nishi.     Patient to address any issues related to substance abuse in treatment with [facility]. Additionally, [facility] in [place] with be coordinating with the [facility] in [place], where patient will be receiving treatment for substance abuse.     Please contact Dr. Jayne Cardenas for medication or treatment questions, lab results or any other concerns at [phone]. Handoff provided to your outpatient provider, [facility].  Patient has agreed to receive a copy of their Nursing/Social Work/MD discharge note in the patient portal.  If needed, please contact [facility], 5N, 24/7 days a week and speak with Eufemia Berry RN Nurse manager or any N staff member.  Please return to the ED if symptoms worsen or return. You have an appointment in person at [facility] in [place] on 1/10/24 at 11 AM with Nishi.     Patient to address any issues related to substance abuse in treatment with [facility]. Additionally, [facility] in [place] with be coordinating with the [facility] in [place], where patient will be receiving treatment for substance abuse.     Please contact Dr. Jayne Cardenas for medication or treatment questions, lab results or any other concerns at [phone]. Handoff provided to your outpatient provider, [facility].  Patient has agreed to receive a copy of their Nursing/Social Work/MD discharge note in the patient portal.  If needed, please contact [facility], 5N, 24/7 days a week and speak with Eufemia Berry RN Nurse manager or any N staff member.  Please return to the ED if symptoms worsen or return.

## 2024-01-03 NOTE — BH DISCHARGE NOTE NURSING/SOCIAL WORK/PSYCH REHAB - NSDCPEHOTLINE_GEN_ALL_CORE
[facility] Smokers Quitline 5-676-MHQQDDC ([phone]) [facility] Smokers Quitline 7-148-DFJCMSM ([phone])

## 2024-01-03 NOTE — BH DISCHARGE NOTE NURSING/SOCIAL WORK/PSYCH REHAB - NSBHDCADDR2FT_A_CORE
[address]  [place], [id number] [address]  [place], [id number]

## 2024-01-03 NOTE — BH DISCHARGE NOTE NURSING/SOCIAL WORK/PSYCH REHAB - [facility]
Unit Name: [address] [place] [address] Unit Phone Number: [phone] Unit Name: [address] [place] [address] Unit Phone Number: [phone]

## 2024-01-03 NOTE — BH INPATIENT PSYCHIATRY PROGRESS NOTE - NSBHMETABOLIC_PSY_ALL_CORE_FT
BMI: BMI (kg/m2): 36.2 (12-08-23 @ 16:20)  HbA1c: A1C with Estimated Average Glucose Result: 6.8 % (12-09-23 @ 07:10)    Glucose:   BP: --Vital Signs Last 24 Hrs  T(C): 36.2 (01-03-24 @ 07:39), Max: 36.2 (01-03-24 @ 07:39)  T(F): 97.2 (01-03-24 @ 07:39), Max: 97.2 (01-03-24 @ 07:39)  HR: --  BP: --  BP(mean): --  RR: 16 (01-03-24 @ 07:39) (16 - 16)  SpO2: 98% (01-03-24 @ 07:39) (98% - 98%)    Orthostatic VS  01-03-24 @ 07:39  Lying BP: --/-- HR: --  Sitting BP: 129/85 HR: 82  Standing BP: 138/81 HR: 90  Site: upper right arm  Mode: electronic  Orthostatic VS  01-02-24 @ 08:07  Lying BP: --/-- HR: --  Sitting BP: 136/90 HR: 82  Standing BP: 126/85 HR: 91  Site: upper right arm  Mode: electronic  Orthostatic VS  01-01-24 @ 21:00  Lying BP: --/-- HR: --  Sitting BP: 115/83 HR: 102  Standing BP: 117/83 HR: 105  Site: --  Mode: electronic    Lipid Panel: Date/Time: 12-09-23 @ 07:10  Cholesterol, Serum: 131  LDL Cholesterol Calculated: 69  HDL Cholesterol, Serum: 29  Total Cholesterol/HDL Ration Measurement: --  Triglycerides, Serum: 195

## 2024-01-03 NOTE — BH DISCHARGE NOTE NURSING/SOCIAL WORK/PSYCH REHAB - NSTOBACCOSMKCESSPRO_PSY_ALL_CORE
.  [facility] Smoker's Quitline   7-137-MWESTFK ([phone]) .  [facility] Smoker's Quitline   6-571-HTTQIEO ([phone])

## 2024-01-03 NOTE — BH DISCHARGE NOTE NURSING/SOCIAL WORK/PSYCH REHAB - NSDCPEWEB_GEN_ALL_CORE
[facility] for Tobacco Control website --- [email or web address]/quitsmoking/[place] website --- [email or web address] [facility] for Tobacco Control website --- [email or web address]/quitsmoking/[place] website --- [email or web address] Hospitalregistracija [email or web address]

## 2024-01-03 NOTE — BH DISCHARGE NOTE NURSING/SOCIAL WORK/PSYCH REHAB - NSBHDCADDR1FT_A_CORE
[address]  [place], [id number] [address]  [place], [id number]

## 2024-01-03 NOTE — BH DISCHARGE NOTE NURSING/SOCIAL WORK/PSYCH REHAB - NSDPDISTO_PSY_ALL_CORE
Pt to return home:   [address] [address]/Home Pt to return home:   [address] [place] [address]/Home

## 2024-01-03 NOTE — BH DISCHARGE NOTE NURSING/SOCIAL WORK/PSYCH REHAB - NSDCPLANREVIEWED_PSY_ALL_CORE
Discharge plan reviewed with patient and patient agreeable to receive a copy of the nursing/social work/MD discharge summary through the patient portal.

## 2024-01-03 NOTE — BH DISCHARGE NOTE NURSING/SOCIAL WORK/PSYCH REHAB - NSBHCRISISRESOURCESOTHER_PSY_ALL_CORE_FT
24/7 Hospital Unit Phone Number: RN Nursing Manager, Eufemia Berry, [phone] 24/7 Hospital Unit Phone Number: RN Nursing Manager, Eufemia Berry, [phone]

## 2024-01-03 NOTE — BH DISCHARGE NOTE NURSING/SOCIAL WORK/PSYCH REHAB - NSDCPEEMAIL_GEN_ALL_CORE
[facility] for Tobacco Control email [email or web address] for Tobacco Control email [email or web address]

## 2024-01-03 NOTE — BH INPATIENT PSYCHIATRY PROGRESS NOTE - NSBHCHARTREVIEWVS_PSY_A_CORE FT
Vital Signs Last 24 Hrs  T(C): 36.2 (01-03-24 @ 07:39), Max: 36.2 (01-03-24 @ 07:39)  T(F): 97.2 (01-03-24 @ 07:39), Max: 97.2 (01-03-24 @ 07:39)  HR: --  BP: --  BP(mean): --  RR: 16 (01-03-24 @ 07:39) (16 - 16)  SpO2: 98% (01-03-24 @ 07:39) (98% - 98%)    Orthostatic VS  01-03-24 @ 07:39  Lying BP: --/-- HR: --  Sitting BP: 129/85 HR: 82  Standing BP: 138/81 HR: 90  Site: upper right arm  Mode: electronic  Orthostatic VS  01-02-24 @ 08:07  Lying BP: --/-- HR: --  Sitting BP: 136/90 HR: 82  Standing BP: 126/85 HR: 91  Site: upper right arm  Mode: electronic  Orthostatic VS  01-01-24 @ 21:00  Lying BP: --/-- HR: --  Sitting BP: 115/83 HR: 102  Standing BP: 117/83 HR: 105  Site: --  Mode: electronic

## 2024-01-03 NOTE — BH DISCHARGE NOTE NURSING/SOCIAL WORK/PSYCH REHAB - NSDCOTHERNAME_GEN_ALL_CORE
[facility] [place] [facility], [place]

## 2024-01-04 VITALS — TEMPERATURE: 97 F

## 2024-01-04 LAB
BASOPHILS # BLD AUTO: 0.03 K/UL — SIGNIFICANT CHANGE UP (ref 0–0.2)
BASOPHILS # BLD AUTO: 0.03 K/UL — SIGNIFICANT CHANGE UP (ref 0–0.2)
BASOPHILS NFR BLD AUTO: 0.5 % — SIGNIFICANT CHANGE UP (ref 0–2)
BASOPHILS NFR BLD AUTO: 0.5 % — SIGNIFICANT CHANGE UP (ref 0–2)
EOSINOPHIL # BLD AUTO: 0.14 K/UL — SIGNIFICANT CHANGE UP (ref 0–0.5)
EOSINOPHIL # BLD AUTO: 0.14 K/UL — SIGNIFICANT CHANGE UP (ref 0–0.5)
EOSINOPHIL NFR BLD AUTO: 2.1 % — SIGNIFICANT CHANGE UP (ref 0–6)
EOSINOPHIL NFR BLD AUTO: 2.1 % — SIGNIFICANT CHANGE UP (ref 0–6)
HCT VFR BLD CALC: 43.6 % — SIGNIFICANT CHANGE UP (ref 39–50)
HCT VFR BLD CALC: 43.6 % — SIGNIFICANT CHANGE UP (ref 39–50)
HGB BLD-MCNC: 15.1 G/DL — SIGNIFICANT CHANGE UP (ref 13–17)
HGB BLD-MCNC: 15.1 G/DL — SIGNIFICANT CHANGE UP (ref 13–17)
IMM GRANULOCYTES NFR BLD AUTO: 0.3 % — SIGNIFICANT CHANGE UP (ref 0–0.9)
IMM GRANULOCYTES NFR BLD AUTO: 0.3 % — SIGNIFICANT CHANGE UP (ref 0–0.9)
LYMPHOCYTES # BLD AUTO: 2.33 K/UL — SIGNIFICANT CHANGE UP (ref 1–3.3)
LYMPHOCYTES # BLD AUTO: 2.33 K/UL — SIGNIFICANT CHANGE UP (ref 1–3.3)
LYMPHOCYTES # BLD AUTO: 35.7 % — SIGNIFICANT CHANGE UP (ref 13–44)
LYMPHOCYTES # BLD AUTO: 35.7 % — SIGNIFICANT CHANGE UP (ref 13–44)
MCHC RBC-ENTMCNC: 30 PG — SIGNIFICANT CHANGE UP (ref 27–34)
MCHC RBC-ENTMCNC: 30 PG — SIGNIFICANT CHANGE UP (ref 27–34)
MCHC RBC-ENTMCNC: 34.6 GM/DL — SIGNIFICANT CHANGE UP (ref 32–36)
MCHC RBC-ENTMCNC: 34.6 GM/DL — SIGNIFICANT CHANGE UP (ref 32–36)
MCV RBC AUTO: 86.5 FL — SIGNIFICANT CHANGE UP (ref 80–100)
MCV RBC AUTO: 86.5 FL — SIGNIFICANT CHANGE UP (ref 80–100)
MONOCYTES # BLD AUTO: 0.46 K/UL — SIGNIFICANT CHANGE UP (ref 0–0.9)
MONOCYTES # BLD AUTO: 0.46 K/UL — SIGNIFICANT CHANGE UP (ref 0–0.9)
MONOCYTES NFR BLD AUTO: 7 % — SIGNIFICANT CHANGE UP (ref 2–14)
MONOCYTES NFR BLD AUTO: 7 % — SIGNIFICANT CHANGE UP (ref 2–14)
NEUTROPHILS # BLD AUTO: 3.55 K/UL — SIGNIFICANT CHANGE UP (ref 1.8–7.4)
NEUTROPHILS # BLD AUTO: 3.55 K/UL — SIGNIFICANT CHANGE UP (ref 1.8–7.4)
NEUTROPHILS NFR BLD AUTO: 54.4 % — SIGNIFICANT CHANGE UP (ref 43–77)
NEUTROPHILS NFR BLD AUTO: 54.4 % — SIGNIFICANT CHANGE UP (ref 43–77)
PLATELET # BLD AUTO: 272 K/UL — SIGNIFICANT CHANGE UP (ref 150–400)
PLATELET # BLD AUTO: 272 K/UL — SIGNIFICANT CHANGE UP (ref 150–400)
RBC # BLD: 5.04 M/UL — SIGNIFICANT CHANGE UP (ref 4.2–5.8)
RBC # BLD: 5.04 M/UL — SIGNIFICANT CHANGE UP (ref 4.2–5.8)
RBC # FLD: 12.5 % — SIGNIFICANT CHANGE UP (ref 10.3–14.5)
RBC # FLD: 12.5 % — SIGNIFICANT CHANGE UP (ref 10.3–14.5)
WBC # BLD: 6.53 K/UL — SIGNIFICANT CHANGE UP (ref 3.8–10.5)
WBC # BLD: 6.53 K/UL — SIGNIFICANT CHANGE UP (ref 3.8–10.5)
WBC # FLD AUTO: 6.53 K/UL — SIGNIFICANT CHANGE UP (ref 3.8–10.5)
WBC # FLD AUTO: 6.53 K/UL — SIGNIFICANT CHANGE UP (ref 3.8–10.5)

## 2024-01-04 PROCEDURE — 99239 HOSP IP/OBS DSCHRG MGMT >30: CPT

## 2024-01-04 RX ORDER — ATORVASTATIN CALCIUM 80 MG/1
1 TABLET, FILM COATED ORAL
Qty: 30 | Refills: 0
Start: 2024-01-04 | End: 2024-02-02

## 2024-01-04 RX ORDER — CLOZAPINE 150 MG/1
1 TABLET, ORALLY DISINTEGRATING ORAL
Qty: 7 | Refills: 3
Start: 2024-01-04 | End: 2024-01-31

## 2024-01-04 RX ORDER — FLUPHENAZINE HYDROCHLORIDE 1 MG/1
25 TABLET, FILM COATED ORAL
Qty: 25 | Refills: 0
Start: 2024-01-04 | End: 2024-01-04

## 2024-01-04 RX ORDER — DIVALPROEX SODIUM 500 MG/1
1 TABLET, DELAYED RELEASE ORAL
Qty: 30 | Refills: 0
Start: 2024-01-04 | End: 2024-02-02

## 2024-01-04 RX ORDER — LISINOPRIL 2.5 MG/1
1 TABLET ORAL
Qty: 30 | Refills: 0
Start: 2024-01-04 | End: 2024-02-02

## 2024-01-04 RX ORDER — CLOZAPINE 150 MG/1
200 TABLET, ORALLY DISINTEGRATING ORAL ONCE
Refills: 0 | Status: COMPLETED | OUTPATIENT
Start: 2024-01-04 | End: 2024-01-04

## 2024-01-04 RX ORDER — METFORMIN HYDROCHLORIDE 850 MG/1
1 TABLET ORAL
Qty: 30 | Refills: 0
Start: 2024-01-04 | End: 2024-02-02

## 2024-01-04 RX ORDER — LANOLIN ALCOHOL/MO/W.PET/CERES
1 CREAM (GRAM) TOPICAL
Qty: 30 | Refills: 0
Start: 2024-01-04 | End: 2024-02-02

## 2024-01-04 RX ADMIN — CLOZAPINE 200 MILLIGRAM(S): 150 TABLET, ORALLY DISINTEGRATING ORAL at 14:04

## 2024-01-04 RX ADMIN — LISINOPRIL 10 MILLIGRAM(S): 2.5 TABLET ORAL at 09:10

## 2024-01-04 RX ADMIN — Medication 1 PATCH: at 07:12

## 2024-01-04 RX ADMIN — Medication 1 PATCH: at 09:10

## 2024-01-04 RX ADMIN — DIVALPROEX SODIUM 500 MILLIGRAM(S): 500 TABLET, DELAYED RELEASE ORAL at 09:10

## 2024-01-04 RX ADMIN — Medication 2 MILLIGRAM(S): at 09:10

## 2024-01-04 RX ADMIN — Medication 1 PATCH: at 09:12

## 2024-01-04 NOTE — BH INPATIENT PSYCHIATRY PROGRESS NOTE - NSCGISEVERILLNESS_PSY_ALL_CORE
5 = Markedly ill - intrusive symptoms that distinctly impair social/occupational function or cause intrusive levels of distress
4 = Moderately ill – overt symptoms causing noticeable, but modest, functional impairment or distress; symptom level may warrant medication
5 = Markedly ill - intrusive symptoms that distinctly impair social/occupational function or cause intrusive levels of distress
3 = Mildly ill – clearly established symptoms with minimal, if any, distress or difficulty in social and occupational function
4 = Moderately ill – overt symptoms causing noticeable, but modest, functional impairment or distress; symptom level may warrant medication
5 = Markedly ill - intrusive symptoms that distinctly impair social/occupational function or cause intrusive levels of distress
4 = Moderately ill – overt symptoms causing noticeable, but modest, functional impairment or distress; symptom level may warrant medication
5 = Markedly ill - intrusive symptoms that distinctly impair social/occupational function or cause intrusive levels of distress
3 = Mildly ill – clearly established symptoms with minimal, if any, distress or difficulty in social and occupational function
5 = Markedly ill - intrusive symptoms that distinctly impair social/occupational function or cause intrusive levels of distress
5 = Markedly ill - intrusive symptoms that distinctly impair social/occupational function or cause intrusive levels of distress
3 = Mildly ill – clearly established symptoms with minimal, if any, distress or difficulty in social and occupational function
5 = Markedly ill - intrusive symptoms that distinctly impair social/occupational function or cause intrusive levels of distress
4 = Moderately ill – overt symptoms causing noticeable, but modest, functional impairment or distress; symptom level may warrant medication

## 2024-01-04 NOTE — BH INPATIENT PSYCHIATRY DISCHARGE NOTE - NSBHASSESSSUMMFT_PSY_ALL_CORE
48 year old single, domiciled [place], unemployed, on disability. Patient was admitted in the context of meds change resulting in Paranoia with A/Hallucinations. He was previously on Clozapine, later was replaced with Seroquel/Depakote which later on aggravated his symptoms leading to admission. He was re-started on Clozapine, with Clozapine titration to Clozapine 200 mg HS and also received Prolixin Decanoate 25 mg IM/3 weeks. He got the shot of Prolixin Decanoate and also on Clozapine which seems to help and has no A/H now with limited residual Paranoia. He has fair to good sleep/appetite, no A/H or paranoid beliefs and has sleep/appetite.    Plan:  Continue current meds:   cloZAPine 200 milliGRAM(s) Oral at bedtime  divalproex  milliGRAM(s) Oral daily  melatonin 3 milliGRAM(s) Oral at bedtime  nicotine - 21 mG/24Hr(s) Patch 1 Patch Transdermal daily  NEW PROBLEMS: COMPLIANCE ISSUES      Clozaril level is 218 and seems OK with symptom control  Depakote level is 14-subtherapeutic, as his Depakote DR dosages is only 500 mg Daily  To receive Prolixin Decanoate 25 mg IM/3 weeks, next dosage is due on 01/18/2024, last received on 12/28/2023

## 2024-01-04 NOTE — BH INPATIENT PSYCHIATRY DISCHARGE NOTE - HOSPITAL COURSE
Patient was admitted voluntarily from ED at  due to worsening  symptoms of paranoia with A/Hallucinations. He was meds compliant from admission and was not an issue. Clozapine was re-started, and slowly titrated to Clozapine 200 mg HS. He also received Prolixin Decanoate 25 mg IM/3 weeks and with Clozapine dosage of 200 mg HS with Prolixin Decanoate his symptoms resolved, he no longer had the delusional beliefs of being a Pedophile. he was visible in unit, blood work for Clozapine was WNL, last blood draw was on 01/04/2024 and his Absolute count was 3550. He is due for next dose of Prolixin Decanoate on 01/18/2024. He was never suicidal, and has no SI on discharge, no drug abuse hx noted except limited alcohol abuse. Cardiac Troponin level was 9.73, Depakote  mg HS with level of 14.     Medically has HTN with HLD and was WNL

## 2024-01-04 NOTE — BH INPATIENT PSYCHIATRY DISCHARGE NOTE - NSDCPROCEDURESFT_PSY_ALL_CORE
CBC-WNL  CHEM-WNL  TSH-1.49  HBA1-C-6.8    Lipid profile  LDL-69  HDL-29  TRG-195    Cardiac Troponin--9.73  KTD-XOL-346-msec  Depakote Level at Depakote  mg--14  Clozapine level on 200 mg HS --218 CBC-WNL  CHEM-WNL  TSH-1.49  HBA1-C-6.8    Lipid profile  LDL-69  HDL-29  TRG-195    Cardiac Troponin--9.73  SHW-UJV-779-msec  Depakote Level at Depakote  mg--14  Clozapine level on 200 mg HS --218

## 2024-01-04 NOTE — BH INPATIENT PSYCHIATRY PROGRESS NOTE - NSBHFUPINTERVALCCFT_PSY_A_CORE
Maybe we can nicerase my Clozaril 
I want to be on Clozaril 
Maybe we can increase my Clozaril 
I want to be on Clozaril 
Maybe we can increase my Clozaril 
I want to be on Clozaril 
I want to be o Clozaril 
Maybe we can nicerase my Clozaril 
Maybe we can increase my Clozaril 
Maybe we can nicerase my Clozaril 
I want to be on Clozaril 
I want to be on Clozaril 
Maybe we can increase my Clozaril

## 2024-01-04 NOTE — BH INPATIENT PSYCHIATRY DISCHARGE NOTE - HPI (INCLUDE ILLNESS QUALITY, SEVERITY, DURATION, TIMING, CONTEXT, MODIFYING FACTORS, ASSOCIATED SIGNS AND SYMPTOMS)
48 year old single, domiciled [place], unemployed, on disability. PPHx of schizoaffective disorder, anxiety multiple prior admissions last in 2012 at [facility]. Was previously on Clozaril 50 mg, doing well aside from feeling tired, changed psychiatrist in July and is now on Depakote and Seroquel, reports symptoms have worsened on this regimen. PMHx of HTN, HDL and restless leg who self presents to hospital for psychiatric evaluation.    During encounter, pleasant calm cooperative. Does not appear particularly distressed dysphoric or overtly manic or psychotic. Linear throughout. Reports feeling okay though states that he's been having vague paranoia and auditory hallucinations ongoing for several weeks-months despite compliance with medications. Follows through [facility] with Dr. Burks (unable to reach, discreet message left). Confirmed partial med list through [facility] pharmacy (patient adds that he also receives a Prolixin long acting injection, last in October.)  Adds that he was previously on clozapine 50 mg nightly though was discontinued for unclear reasons (believes that his provider did not think it necessary considering low dose vs frequent cbc monitoring). Valproic acid decreased from 1500 to 500 mg by his request. Prolixin 25 mg [id number]. No SI/HI. AH is non-command in nature, however are saying disturbing things to him, stating, "Ever since I got off the Clozaril my head hasn't been right, I am having really bad thoughts, really bizarre and hearing voices telling me I killed people when I was younger and I'm a pedophile." He also says for the past few days his brain has not been working and he thinks this is because the government maybe gave him a lobotomy many years ago. Relapsed on alcohol several weeks ago, last drink last November 14, 2023 2x 24 ounces beers. + No tobacco 1PPD, no drug use. 48 year old single, domiciled [place], unemployed, on disability. PPHx of schizoaffective disorder, anxiety multiple prior admissions last in 2012 at [facility]. Was previously on Clozaril 50 mg, doing well aside from feeling tired, changed psychiatrist in July and is now on Depakote and Seroquel, reports symptoms have worsened on this regimen. PMHx of HTN, HDL and restless leg who self presents to hospital for psychiatric evaluation.    During encounter, pleasant calm cooperative. Does not appear particularly distressed dysphoric or overtly manic or psychotic. Linear throughout. Reports feeling okay though states that he's been having vague paranoia and auditory hallucinations ongoing for several weeks-months despite compliance with medications. Follows through [facility] with Dr. Burks (unable to reach, discreet message left). Confirmed partial med list through [facility] pharmacy (patient adds that he also receives a Prolixin long acting injection, last in October.)  Adds that he was previously on clozapine 50 mg nightly though was discontinued for unclear reasons (believes that his provider did not think it necessary considering low dose vs frequent cbc monitoring). Valproic acid decreased from 1500 to 500 mg by his request. Prolixin 25 mg [id number]. No SI/HI. AH is non-command in nature, however are saying disturbing things to him, stating, "Ever since I got off the Clozaril my head hasn't been right, I am having really bad thoughts, really bizarre and hearing voices telling me I killed people when I was younger and I'm a pedophile." He also says for the past few days his brain has not been working and he thinks this is because the government maybe gave him a lobotomy many years ago. Relapsed on alcohol several weeks ago, last drink last November 14, 2023 2x 24 ounces beers. + No tobacco 1PPD, no drug use.

## 2024-01-04 NOTE — BH INPATIENT PSYCHIATRY PROGRESS NOTE - NSTXCOPEINTERMD_PSY_ALL_CORE
SAD-On Prolixin Decanoate 25 mg IM/3 weeks, last received on 12/06/2023, and on Clozapine 150 mg HS trial-Still Symptomatic.
SAD-On Prolixin Decanoate 25 mg IM/3 weeks, last received on 12/06/2023, and on Clozapine 50 mg HS trial-Still Symptomatic.
SAD-On Prolixin Decanoate 25 mg IM/3 weeks, last received on 12/06/2023, and on Clozapine 50 mg HS trial-Still Symptomatic.
SAD-On Prolixin Decanoate 25 mg IM/3 weeks, last received on 12/06/2023, and on Clozapine 150 mg HS trial-Still Symptomatic.
SAD-On Prolixin Decanoate 25 mg IM/3 weeks, last received on 12/06/2023, and on Clozapine 50 mg HS trial-Still Symptomatic.
SAD-On Prolixin Decanoate 25 mg IM/3 weeks, last received on 12/06/2023, and on Clozapine 50 mg HS trial-Still Symptomatic.
SAD-On Prolixin Decanoate 25 mg IM/3 weeks, last received on 12/28/2023, and on Clozapine 200 mg HS trial-Still Symptomatic.
SAD-On Prolixin Decanoate 25 mg IM/3 weeks, last received on 12/28/2023, and on Clozapine 200 mg HS trial-Still Symptomatic.
SAD-On Prolixin Decanoate 25 mg IM/3 weeks, last received on 12/06/2023, and on Clozapine 150 mg HS trial-Still Symptomatic.
SAD-On Prolixin Decanoate 25 mg IM/3 weeks, last received on 12/28/2023, and on Clozapine 200 mg HS trial-Still Symptomatic.
SAD-On Prolixin Decanoate 25 mg IM/3 weeks, last received on 12/06/2023, and on Clozapine 150 mg HS trial-Still Symptomatic.
SAD-On Prolixin Decanoate 25 mg IM/3 weeks, last received on 12/06/2023, and on Clozapine 150 mg HS trial-Still Symptomatic.
SAD-On Prolixin Decanoate 25 mg IM/3 weeks, last received on 12/06/2023, and on Clozapine 50 mg HS trial-Still Symptomatic.
SAD-On Prolixin Decanoate 25 mg IM/3 weeks, last received on 12/06/2023, and on Clozapine 150 mg HS trial-Still Symptomatic.
SAD-On Prolixin Decanoate 25 mg IM/3 weeks, last received on 12/06/2023, and on Clozapine 50 mg HS trial-Still Symptomatic.
SAD-On Prolixin Decanoate 25 mg IM/3 weeks, last received on 12/28/2023, and on Clozapine 200 mg HS trial-Still Symptomatic.

## 2024-01-04 NOTE — BH INPATIENT PSYCHIATRY PROGRESS NOTE - NSICDXBHPRIMARYDX_PSY_ALL_CORE
Schizoaffective disorder   F25.9  

## 2024-01-04 NOTE — BH INPATIENT PSYCHIATRY PROGRESS NOTE - NSBHMETABOLIC_PSY_ALL_CORE_FT
BMI: BMI (kg/m2): 36.2 (12-08-23 @ 16:20)  HbA1c: A1C with Estimated Average Glucose Result: 6.8 % (12-09-23 @ 07:10)    Glucose:   BP: --Vital Signs Last 24 Hrs  T(C): 36.2 (01-04-24 @ 07:04), Max: 36.6 (01-03-24 @ 19:42)  T(F): 97.2 (01-04-24 @ 07:04), Max: 97.9 (01-03-24 @ 19:42)  HR: --  BP: --  BP(mean): --  RR: 18 (01-04-24 @ 07:04) (18 - 18)  SpO2: 97% (01-04-24 @ 07:04) (97% - 98%)    Orthostatic VS  01-04-24 @ 07:04  Lying BP: --/-- HR: --  Sitting BP: 157/106 HR: 84  Standing BP: 153/104 HR: 95  Site: upper right arm  Mode: --  Orthostatic VS  01-03-24 @ 19:42  Lying BP: --/-- HR: --  Sitting BP: 150/89 HR: 101  Standing BP: 155/72 HR: 107  Site: upper left arm  Mode: electronic  Orthostatic VS  01-03-24 @ 07:39  Lying BP: --/-- HR: --  Sitting BP: 129/85 HR: 82  Standing BP: 138/81 HR: 90  Site: upper right arm  Mode: electronic    Lipid Panel: Date/Time: 12-09-23 @ 07:10  Cholesterol, Serum: 131  LDL Cholesterol Calculated: 69  HDL Cholesterol, Serum: 29  Total Cholesterol/HDL Ration Measurement: --  Triglycerides, Serum: 195

## 2024-01-04 NOTE — BH INPATIENT PSYCHIATRY PROGRESS NOTE - NSTXDCOTHRDATETRGT_PSY_ALL_CORE
26-Dec-2023
09-Dec-2023
09-Dec-2023
26-Dec-2023
09-Dec-2023
26-Dec-2023
26-Dec-2023
09-Dec-2023
09-Dec-2023
26-Dec-2023
09-Dec-2023
26-Dec-2023
09-Dec-2023
09-Dec-2023
26-Dec-2023
09-Dec-2023
04-Jan-2024
26-Dec-2023
26-Dec-2023

## 2024-01-04 NOTE — BH INPATIENT PSYCHIATRY PROGRESS NOTE - NSBHMSEAFFRANGE_PSY_A_CORE
Constricted
Full
Constricted
Full
Full
Constricted
Full
Full
Constricted
Full
Constricted
Constricted
Full
Constricted
Full

## 2024-01-04 NOTE — BH INPATIENT PSYCHIATRY PROGRESS NOTE - NSTXPSYCHODATETRGT_PSY_ALL_CORE
24-Dec-2023
07-Jan-2024
17-Dec-2023
24-Dec-2023
17-Dec-2023
17-Dec-2023
07-Jan-2024
24-Dec-2023
17-Dec-2023
17-Dec-2023
07-Jan-2024
17-Dec-2023
24-Dec-2023
17-Dec-2023

## 2024-01-04 NOTE — BH INPATIENT PSYCHIATRY PROGRESS NOTE - NSTXCOPEPROGRES_PSY_ALL_CORE
Improving
Improving
No Change
Improving
No Change
Improving
Improving
No Change
No Change
Improving
Improving

## 2024-01-04 NOTE — BH INPATIENT PSYCHIATRY DISCHARGE NOTE - NSBHMETABOLIC_PSY_ALL_CORE_FT
BMI: BMI (kg/m2): 36.2 (12-08-23 @ 16:20)  HbA1c: A1C with Estimated Average Glucose Result: 6.8 % (12-09-23 @ 07:10)  TSH-1.49    Glucose:   BP: --Vital Signs Last 24 Hrs  T(C): 36.2 (01-04-24 @ 07:04), Max: 36.6 (01-03-24 @ 19:42)  T(F): 97.2 (01-04-24 @ 07:04), Max: 97.9 (01-03-24 @ 19:42)  HR: --  BP: --  BP(mean): --  RR: 18 (01-04-24 @ 07:04) (18 - 18)  SpO2: 97% (01-04-24 @ 07:04) (97% - 98%)    Orthostatic VS  01-04-24 @ 07:04  Lying BP: --/-- HR: --  Sitting BP: 157/106 HR: 84  Standing BP: 153/104 HR: 95  Site: upper right arm  Mode: --  Orthostatic VS  01-03-24 @ 19:42  Lying BP: --/-- HR: --  Sitting BP: 150/89 HR: 101  Standing BP: 155/72 HR: 107  Site: upper left arm  Mode: electronic  Orthostatic VS  01-03-24 @ 07:39  Lying BP: --/-- HR: --  Sitting BP: 129/85 HR: 82  Standing BP: 138/81 HR: 90  Site: upper right arm  Mode: electronic    Lipid Panel: Date/Time: 12-09-23 @ 07:10  Cholesterol, Serum: 131  LDL Cholesterol Calculated: 69  HDL Cholesterol, Serum: 29  Triglycerides, Serum: 195

## 2024-01-04 NOTE — BH INPATIENT PSYCHIATRY PROGRESS NOTE - NSTXPROBDCFAM_PSY_ALL_CORE
DISCHARGE ISSUE - POOR ENGAGEMENT WITH SUPPORTS/FAMILY

## 2024-01-04 NOTE — BH INPATIENT PSYCHIATRY PROGRESS NOTE - NSTXCOPEDATEEST_PSY_ALL_CORE
09-Dec-2023
23-Dec-2023
06-Jan-2024
30-Dec-2023
23-Dec-2023
16-Dec-2023
16-Dec-2023
23-Dec-2023
30-Dec-2023
30-Dec-2023
09-Dec-2023
23-Dec-2023
16-Dec-2023
23-Dec-2023
06-Jan-2024
16-Dec-2023
16-Dec-2023

## 2024-01-04 NOTE — BH INPATIENT PSYCHIATRY PROGRESS NOTE - NSBHCHARTREVIEWVS_PSY_A_CORE FT
Vital Signs Last 24 Hrs  T(C): 36.2 (01-04-24 @ 07:04), Max: 36.6 (01-03-24 @ 19:42)  T(F): 97.2 (01-04-24 @ 07:04), Max: 97.9 (01-03-24 @ 19:42)  HR: --  BP: --  BP(mean): --  RR: 18 (01-04-24 @ 07:04) (18 - 18)  SpO2: 97% (01-04-24 @ 07:04) (97% - 98%)    Orthostatic VS  01-04-24 @ 07:04  Lying BP: --/-- HR: --  Sitting BP: 157/106 HR: 84  Standing BP: 153/104 HR: 95  Site: upper right arm  Mode: --  Orthostatic VS  01-03-24 @ 19:42  Lying BP: --/-- HR: --  Sitting BP: 150/89 HR: 101  Standing BP: 155/72 HR: 107  Site: upper left arm  Mode: electronic  Orthostatic VS  01-03-24 @ 07:39  Lying BP: --/-- HR: --  Sitting BP: 129/85 HR: 82  Standing BP: 138/81 HR: 90  Site: upper right arm  Mode: electronic

## 2024-01-04 NOTE — BH INPATIENT PSYCHIATRY DISCHARGE NOTE - OTHER PAST PSYCHIATRIC HISTORY (INCLUDE DETAILS REGARDING ONSET, COURSE OF ILLNESS, INPATIENT/OUTPATIENT TREATMENT)
48 year old single, domiciled [place], unemployed, on disability. PPHx of schizoaffective disorder, anxiety multiple prior admissions last in 2012 at [facility]. Was previously on Clozaril 50 mg, changed psychiatrist in July and is now on Depakote and Seroquel, reports symptoms have worsened on this regimen. Pt reports also receives a Prolixin long acting injection 25 mg [id number]. No SI/HI.  Pt reports AH "I am having really bad thoughts, really bizarre and hearing voices telling me I killed people when I was younger and I'm a pedophile."  Relapsed on alcohol several weeks ago, last drink last November 14, 2023 2x 24 ounces beers. + No tobacco 1PPD, no drug use.    Pt reports is compliant with meds however having paranoia and auditory hallucinations for several weeks. 48 year old single, domiciled [place], unemployed, on disability. PPHx of schizoaffective disorder, anxiety multiple prior admissions last in 2012 at [facility]. Was previously on Clozaril 50 mg, changed psychiatrist in July and is now on Depakote and Seroquel, reports symptoms have worsened on this regimen. Pt reports also receives a Prolixin long acting injection 25 mg [id number]. No SI/HI.  Pt reports AH "I am having really bad thoughts, really bizarre and hearing voices telling me I killed people when I was younger and I'm a pedophile."  Relapsed on alcohol several weeks ago, last drink last November 14, 2023 2x 24 ounces beers. + No tobacco 1PPD, no drug use.    Pt reports is compliant with meds however having paranoia and auditory hallucinations for several weeks.

## 2024-01-04 NOTE — BH INPATIENT PSYCHIATRY PROGRESS NOTE - NSTXDCOTHRINTERMD_PSY_ALL_CORE
SAD-On Prolixin Decanoate 25 mg IM/3 weeks, last received on 12/06/2023, and on Clozapine 150 mg HS trial-Still Symptomatic.
SAD-On Prolixin Decanoate 25 mg IM/3 weeks, last received on 12/06/2023, and on Clozapine 50 mg HS trial-Still Symptomatic.
SAD-On Prolixin Decanoate 25 mg IM/3 weeks, last received on 12/28/2023, and on Clozapine 200 mg HS trial-Still Symptomatic.
SAD-On Prolixin Decanoate 25 mg IM/3 weeks, last received on 12/06/2023, and on Clozapine 50 mg HS trial-Still Symptomatic.
SAD-On Prolixin Decanoate 25 mg IM/3 weeks, last received on 12/28/2023, and on Clozapine 200 mg HS trial-Still Symptomatic.
SAD-On Prolixin Decanoate 25 mg IM/3 weeks, last received on 12/06/2023, and on Clozapine 150 mg HS trial-Still Symptomatic.
SAD-On Prolixin Decanoate 25 mg IM/3 weeks, last received on 12/06/2023, and on Clozapine 50 mg HS trial-Still Symptomatic.
SAD-On Prolixin Decanoate 25 mg IM/3 weeks, last received on 12/28/2023, and on Clozapine 200 mg HS trial-Still Symptomatic.
SAD-On Prolixin Decanoate 25 mg IM/3 weeks, last received on 12/06/2023, and on Clozapine 50 mg HS trial-Still Symptomatic.
SAD-On Prolixin Decanoate 25 mg IM/3 weeks, last received on 12/06/2023, and on Clozapine 50 mg HS trial-Still Symptomatic.
SAD-On Prolixin Decanoate 25 mg IM/3 weeks, last received on 12/06/2023, and on Clozapine 150 mg HS trial-Still Symptomatic.
SAD-On Prolixin Decanoate 25 mg IM/3 weeks, last received on 12/06/2023, and on Clozapine 50 mg HS trial-Still Symptomatic.
SAD-On Prolixin Decanoate 25 mg IM/3 weeks, last received on 12/06/2023, and on Clozapine 150 mg HS trial-Still Symptomatic.
SAD-On Prolixin Decanoate 25 mg IM/3 weeks, last received on 12/06/2023, and on Clozapine 150 mg HS trial-Still Symptomatic.
SAD-On Prolixin Decanoate 25 mg IM/3 weeks, last received on 12/28/2023, and on Clozapine 200 mg HS trial-Still Symptomatic.
SAD-On Prolixin Decanoate 25 mg IM/3 weeks, last received on 12/06/2023, and on Clozapine 150 mg HS trial-Still Symptomatic.

## 2024-01-04 NOTE — BH INPATIENT PSYCHIATRY PROGRESS NOTE - NSTXPSYCHOGOAL_PSY_ALL_CORE
Will be able to report experiencing hallucinations to staff
Will identify 2 coping skills that assist with focus on reality
Will identify 2 coping skills that help mitigate hallucinations
Will be able to report experiencing hallucinations to staff
Will be able to report experiencing hallucinations to staff
Will identify 2 coping skills that help mitigate hallucinations
Will identify 2 coping skills that assist with focus on reality
Will be able to report experiencing hallucinations to staff
Will identify 2 coping skills that assist with focus on reality
Will identify 2 coping skills that assist with focus on reality
Will be able to report experiencing hallucinations to staff
Will identify 2 coping skills that assist with focus on reality
Will be able to report experiencing hallucinations to staff
Will be able to report experiencing hallucinations to staff
Will identify 2 coping skills that assist with focus on reality
Will identify 2 coping skills that assist with focus on reality
Will identify 2 coping skills that help mitigate hallucinations

## 2024-01-04 NOTE — BH INPATIENT PSYCHIATRY PROGRESS NOTE - NSBHASSESSSUMMFT_PSY_ALL_CORE
48 year old single, domiciled [place], unemployed, on disability. PPHx of schizophrenia, anxiety multiple prior admissions last in 2012 at [facility]. Was previously on Clozaril 50 mg, doing well aside from feeling tired, changed psychiatrist in July and is now on Depakote and Seroquel, reports symptoms have worsened on this regimen. PMHx of HTN, HDL and restless leg who self presents to hospital for psychiatric evaluation.  Patient presents with Schizoaffective disorder with exacerbation of symptoms since recent medication change. No SI/HI. +AH, not command in nature. +Vague paranoia and delusions. These symptoms represent a change from baseline from which the patient cannot be reasonably expected to improve with current level of care. The patient is requesting voluntary inpatient psychiatric hospitalization for safety and stabilization of psychiatric symptoms.  h/o schizophrenia vs schizoaffective d/o    Plan:  Continue current meds:   cloZAPine 200 milliGRAM(s) Oral at bedtime  divalproex  milliGRAM(s) Oral daily  melatonin 3 milliGRAM(s) Oral at bedtime  nicotine - 21 mG/24Hr(s) Patch 1 Patch Transdermal daily  NEW PROBLEMS: COMPLIANCE ISSUES    VPA level IS 14 Suggest that he is not swallowing his typical.  Clozaril level Is 218 and    48 year old single, domiciled [place], unemployed, on disability. PPHx of schizophrenia, anxiety multiple prior admissions last in 2012 at [facility]. Was previously on Clozaril 50 mg, doing well aside from feeling tired, changed psychiatrist in July and is now on Depakote and Seroquel, reports symptoms have worsened on this regimen. PMHx of HTN, HDL and restless leg who self presents to hospital for psychiatric evaluation.  Patient presents with Schizoaffective disorder with exacerbation of symptoms since recent medication change. No SI/HI. +AH, not command in nature. +Vague paranoia and delusions. These symptoms represent a change from baseline from which the patient cannot be reasonably expected to improve with current level of care. The patient is requesting voluntary inpatient psychiatric hospitalization for safety and stabilization of psychiatric symptoms.  h/o schizophrenia vs schizoaffective d/o    Plan:  Continue current meds:   cloZAPine 200 milliGRAM(s) Oral at bedtime  divalproex  milliGRAM(s) Oral daily  melatonin 3 milliGRAM(s) Oral at bedtime  nicotine - 21 mG/24Hr(s) Patch 1 Patch Transdermal daily  NEW PROBLEMS: COMPLIANCE ISSUES    VPA level IS 14 Suggest that he is not swallowing his typical.  Clozaril level Is 218 and

## 2024-01-04 NOTE — BH INPATIENT PSYCHIATRY PROGRESS NOTE - NSBHATTESTBILLING_PSY_A_CORE
14749-Spmlxmlkhs OBS or IP - moderate complexity OR 35-49 mins 36483-Sljtcnonzm OBS or IP - moderate complexity OR 35-49 mins

## 2024-01-04 NOTE — BH INPATIENT PSYCHIATRY PROGRESS NOTE - NSTXDCOTHRPROGRES_PSY_ALL_CORE
Improving
No Change
Improving
No Change
Improving
Improving
No Change
Improving
No Change
Improving
No Change
Improving
Improving
No Change
Improving
Improving
No Change

## 2024-01-04 NOTE — BH INPATIENT PSYCHIATRY PROGRESS NOTE - NSDCCRITERIA_PSY_ALL_CORE
There would be marked reduction of psychotic symptoms.

## 2024-01-04 NOTE — BH INPATIENT PSYCHIATRY PROGRESS NOTE - NSBHMSEBODY_PSY_A_CORE
Overweight
Overweight
Average build
Overweight
Average build
Average build
Overweight
Average build
Overweight
Average build
Overweight
Average build
Overweight
Average build
Overweight

## 2024-01-04 NOTE — BH INPATIENT PSYCHIATRY PROGRESS NOTE - NSCGIIMPROVESX_PSY_ALL_CORE
2 = Much improved - notably better with signficant reduction of symptoms; increase in the level of functioning but some symptoms remain
3 = Minimally improved - slightly better with little or no clinically meaningful reduction of symptoms.  Represents very little change in basic clinical status, level of care, or functional capacity.
3 = Minimally improved - slightly better with little or no clinically meaningful reduction of symptoms.  Represents very little change in basic clinical status, level of care, or functional capacity.
4 = No change - symptoms remain essentially unchanged
2 = Much improved - notably better with signficant reduction of symptoms; increase in the level of functioning but some symptoms remain
4 = No change - symptoms remain essentially unchanged
3 = Minimally improved - slightly better with little or no clinically meaningful reduction of symptoms.  Represents very little change in basic clinical status, level of care, or functional capacity.
3 = Minimally improved - slightly better with little or no clinically meaningful reduction of symptoms.  Represents very little change in basic clinical status, level of care, or functional capacity.
4 = No change - symptoms remain essentially unchanged
2 = Much improved - notably better with signficant reduction of symptoms; increase in the level of functioning but some symptoms remain
4 = No change - symptoms remain essentially unchanged

## 2024-01-04 NOTE — BH INPATIENT PSYCHIATRY DISCHARGE NOTE - NSBHFUPINTERVALHXFT_PSY_A_CORE
01/04/2024: Patient was seen today AM, chart reviewed a discussed in team. He has been doing very well for past few days, Moo din control m denied any delusional beliefs he had previously. He has good sleep/appetite, no meds induced s/e noted till now, Clozapine dosage is 200 mg HS and his absolute count is WNL with blood drawn today. To continue meds as ordered and to f/u as per SW referral. Meds were sent to pharmacy as requested.

## 2024-01-04 NOTE — BH INPATIENT PSYCHIATRY PROGRESS NOTE - NSTXDCFAMGOAL_PSY_ALL_CORE
Will agree to involve supports/family in treatment and discharge planning

## 2024-01-04 NOTE — BH INPATIENT PSYCHIATRY PROGRESS NOTE - NSBHCHARTREVIEWINVESTIGATE_PSY_A_CORE FT
Ventricular Rate 93 BPM    Atrial Rate 93 BPM    P-R Interval 202 ms    QRS Duration 90 ms    Q-T Interval 338 ms    QTC Calculation(Bazett) 420 ms    P Axis 37 degrees    R Axis 13 degrees    T Axis 50 degrees    Diagnosis Line Normal sinus rhythm  Possible Left atrial enlargement  Borderline ECG  No previous ECGs available    

## 2024-01-04 NOTE — BH INPATIENT PSYCHIATRY DISCHARGE NOTE - NSDCCPCAREPLAN_GEN_ALL_CORE_FT
PRINCIPAL DISCHARGE DIAGNOSIS  Diagnosis: Schizoaffective disorder  Assessment and Plan of Treatment: Clozapine 200 mg HS: Prolixin Decanoate 25 mg/3 weeks, next dose of Prolixin Decanoate 25 mg IM due on 01/18/2024

## 2024-01-04 NOTE — BH INPATIENT PSYCHIATRY PROGRESS NOTE - NSTXCOPEDATETRGT_PSY_ALL_CORE
06-Jan-2024
30-Dec-2023
30-Dec-2023
23-Dec-2023
30-Dec-2023
23-Dec-2023
16-Dec-2023
30-Dec-2023
23-Dec-2023
23-Dec-2023
30-Dec-2023
23-Dec-2023
06-Jan-2024
16-Dec-2023
06-Jan-2024
23-Dec-2023
23-Dec-2023

## 2024-01-04 NOTE — BH INPATIENT PSYCHIATRY PROGRESS NOTE - NSTXDCOTHRDATEEST_PSY_ALL_CORE
Spoke with patient. She will do the miralax prep.    
09-Dec-2023
19-Dec-2023
09-Dec-2023
19-Dec-2023
09-Dec-2023
09-Dec-2023
19-Dec-2023
09-Dec-2023
03-Jan-2024
19-Dec-2023

## 2024-01-04 NOTE — BH INPATIENT PSYCHIATRY PROGRESS NOTE - NSBHMSEAFFQUAL_PSY_A_CORE
Euthymic

## 2024-01-04 NOTE — BH INPATIENT PSYCHIATRY PROGRESS NOTE - NSBHCHARTREVIEWLAB_PSY_A_CORE FT
Thyroid Stimulating Hormone, Serum (12.09.23 @ 07:10)    Thyroid Stimulating Hormone, Serum: 1.49 uU/mL  Lipid Profile (12.09.23 @ 07:10)    Cholesterol: 131: Interpretive Comment:    Triglycerides, Serum: 195: Interpretive Comment:    HDL Cholesterol: 29: Interpretive Comment:    Non HDL Cholesterol: 102: Optimal Non-HDL Cholesterol (Non-HDL-C)    LDL Cholesterol Calculated: 69: Optimal LDL Cholesterol (LDL-C)    A1C with Estimated Average Glucose (12.09.23 @ 07:10)    A1C with Estimated Average Glucose Result: 6.8: Method: Immunoassay       Reference Range                4.0-5.6%       High risk (prediabetic)        5.7-6.4%       Diabetic, diagnostic             >=6.5%       ADA diabetic treatment goal       <7.0%  The Hemoglobin A1c testing is NGSP-certified.Reference ranges are based  upon the 2010 recommendations of  the American Diabetes Association.  Interpretation may vary for children  and adolescents. %    Estimated Average Glucose: 148: The Estimated Average Glucose (eAG) or Mean Plasma Glucose (MPG) value is  calculated from the hemoglobin A1c value and covers the same time period.   The American Diabetes Association (ADA) and other professional  organizations recommend reporting the eAG with the HgbA1c. mg/dL        

## 2024-01-04 NOTE — BH INPATIENT PSYCHIATRY DISCHARGE NOTE - NSDCMRMEDTOKEN_GEN_ALL_CORE_FT
atorvastatin 40 mg oral tablet: 1 tab(s) orally once a day (at bedtime)  cloZAPine 200 mg oral tablet: 1 tab(s) orally once a day (at bedtime) Absolute Count on 01/04/2024--3550  divalproex sodium 500 mg oral delayed release tablet: 1 tab(s) orally once a day  fluPHENAZine decanoate 25 mg/mL injectable solution: 25 milligram(s) intramuscularly every 3 weeks To receive 25 mg IM On 01/18/2024  Melatonin 3 mg oral tablet: 1 tab(s) orally once a day (at bedtime)  metFORMIN 1000 mg oral tablet: 1 tab(s) orally once a day (at bedtime)  Zestril 10 mg oral tablet: 1 tab(s) orally once a day

## 2024-01-04 NOTE — BH INPATIENT PSYCHIATRY PROGRESS NOTE - NSTXDCOTHRGOAL_PSY_ALL_CORE
Patient will be referred to the appropriate level of outpatient treatment and have appointments within 3-5 days of discharge.  Patient will be discharged to safe housing either back home or DSS emergency housing.  Benefits in place   will explore need for dual diagnosis tx with appointments if needed.
Patient will be referred to the appropriate level of outpatient treatment and have appointments within 3-5 days of discharge.Patient will be discharged to safe housing either back home or DSS emergency housing.Benefits in place.  will explore need for dual diagnosis tx with appointments if needed.
Patient will be referred to the appropriate level of outpatient treatment and have appointments within 3-5 days of discharge.  Patient will be discharged to safe housing either back home or DSS emergency housing.  Benefits in place   will explore need for dual diagnosis tx with appointments if needed.
Patient will be referred to the appropriate level of outpatient treatment and have appointments within 3-5 days of discharge.Patient will be discharged to safe housing either back home or DSS emergency housing.Benefits in place.  will explore need for dual diagnosis tx with appointments if needed.
Patient will be referred to the appropriate level of outpatient treatment and have appointments within 3-5 days of discharge.  Patient will be discharged to safe housing either back home or DSS emergency housing.  Benefits in place   will explore need for dual diagnosis tx with appointments if needed.
Patient will be referred to the appropriate level of outpatient treatment and have appointments within 3-5 days of discharge.Patient will be discharged to safe housing either back home or DSS emergency housing.Benefits in place.  will explore need for dual diagnosis tx with appointments if needed.
Patient will be referred to the appropriate level of outpatient treatment and have appointments within 3-5 days of discharge.  Patient will be discharged to safe housing either back home or DSS emergency housing.  Benefits in place   will explore need for dual diagnosis tx with appointments if needed.
Patient will be referred to the appropriate level of outpatient treatment and have appointments within 3-5 days of discharge.Patient will be discharged to safe housing either back home or [facility] emergency housing.Benefits in place.  will explore need for dual diagnosis tx with appointments if needed.

## 2024-01-04 NOTE — BH INPATIENT PSYCHIATRY PROGRESS NOTE - NSTXPSYCHODATEEST_PSY_ALL_CORE
10-Dec-2023
17-Dec-2023
17-Dec-2023
10-Dec-2023
17-Dec-2023
10-Dec-2023
08-Dec-2023
08-Dec-2023
17-Dec-2023
17-Dec-2023
10-Dec-2023
08-Dec-2023
17-Dec-2023
17-Dec-2023

## 2024-01-04 NOTE — BH INPATIENT PSYCHIATRY PROGRESS NOTE - NSBHMSEMOOD_PSY_A_CORE
Anxious
Anxious
Depressed/Anxious
Anxious
Depressed/Anxious
Anxious
Anxious
Depressed/Anxious
Normal
Anxious
Depressed/Anxious

## 2024-01-04 NOTE — BH INPATIENT PSYCHIATRY PROGRESS NOTE - NSBHMSETHTCONTENT_PSY_A_CORE
Unremarkable
Unremarkable
Other
Unremarkable
Other
Unremarkable
Unremarkable
Other
Other
Unremarkable
Other
Other
Unremarkable

## 2024-01-04 NOTE — BH INPATIENT PSYCHIATRY PROGRESS NOTE - NSBHMSESPEECH_PSY_A_CORE
Normal volume, rate, productivity, spontaneity and articulation

## 2024-01-04 NOTE — BH INPATIENT PSYCHIATRY PROGRESS NOTE - NSTXPSYCHOINTERMD_PSY_ALL_CORE
SAD-On Prolixin Decanoate 25 mg IM/3 weeks, last received on 12/06/2023, and on Clozapine 50 mg HS trial-Still Symptomatic.
SAD-On Prolixin Decanoate 25 mg IM/3 weeks, last received on 12/06/2023, and on Clozapine 50 mg HS trial-Still Symptomatic.
SAD-On Prolixin Decanoate 25 mg IM/3 weeks, last received on 12/06/2023, and on Clozapine 150 mg HS trial-Still Symptomatic.
SAD-On Prolixin Decanoate 25 mg IM/3 weeks, last received on 12/06/2023, and on Clozapine 150 mg HS trial-Still Symptomatic.
SAD-On Prolixin Decanoate 25 mg IM/3 weeks, last received on 12/28/2023, and on Clozapine 200 mg HS trial-Still Symptomatic.
SAD-On Prolixin Decanoate 25 mg IM/3 weeks, last received on 12/28/2023, and on Clozapine 200 mg HS trial-Still Symptomatic.
SAD-On Prolixin Decanoate 25 mg IM/3 weeks, last received on 12/06/2023, and on Clozapine 150 mg HS trial-Still Symptomatic.
SAD-On Prolixin Decanoate 25 mg IM/3 weeks, last received on 12/06/2023, and on Clozapine 50 mg HS trial-Still Symptomatic.
SAD-On Prolixin Decanoate 25 mg IM/3 weeks, last received on 12/06/2023, and on Clozapine 150 mg HS trial-Still Symptomatic.
SAD-On Prolixin Decanoate 25 mg IM/3 weeks, last received on 12/06/2023, and on Clozapine 150 mg HS trial-Still Symptomatic.
SAD-On Prolixin Decanoate 25 mg IM/3 weeks, last received on 12/28/2023, and on Clozapine 200 mg HS trial-Still Symptomatic.
SAD-On Prolixin Decanoate 25 mg IM/3 weeks, last received on 12/06/2023, and on Clozapine 150 mg HS trial-Still Symptomatic.
SAD-On Prolixin Decanoate 25 mg IM/3 weeks, last received on 12/28/2023, and on Clozapine 200 mg HS trial-Still Symptomatic.

## 2024-01-04 NOTE — BH INPATIENT PSYCHIATRY PROGRESS NOTE - NSBHMSEPERCEPT_PSY_A_CORE
Auditory hallucinations
Auditory hallucinations
No abnormalities
Auditory hallucinations
No abnormalities
Auditory hallucinations
Auditory hallucinations
No abnormalities
Auditory hallucinations

## 2024-01-04 NOTE — BH INPATIENT PSYCHIATRY PROGRESS NOTE - NSBHFUPINTERVALHXFT_PSY_A_CORE
01/04/2024: Patient was seen today AM, chart reviewed a discussed in team. He has been doing very well for past few days, Moo din control m denied any delusional beliefs he had previously. He has good sleep/appetite, no meds induced s/e noted till now, Clozapine dosage is 200 mg HS and his absolute count is WNL with blood drawn today. To continue meds as ordered and to f/u as per  referral. Meds were sent to pharmacy as requested.    01/03/2024: Patient seen today, no acute issues, aware that he is leaving tomorrow and need CBC tomorrow AM to continue Clozapine. He is also on Prolixin Decanoate 25 mg IM/3 weeks, last received on 12/28 and now due on 01/18/2024 for continued treatment and stability. CBC for tomorrow AM.    01/02/2024: Patient was seen today AM, chart reviewed and discussed in team. Mood improved, has a smile today AM and endorses that he feels better and would like to go home as he feels that he no longer has to stay here. He takes meds as prescribed. On Clozapine 200 mg HS with Prolixin Decanoate 25 mg IM/3 weeks, last received on 12/28/2023, next pos due on 01/18/2024. Absolute Count is 4.29, and has no meds induced s/.e till now e.g Drooling from Clozapine. Depakote level is 14. In good mood control now, no further meds titration needed. No S/H/I/P.    12/28/2023: Patient was seen today AM, chart reviewed and discussed in team. He has improved mood, endorses that he has decreased belief of the pedophile now, and it does not annoy him anymore, he added that the T. Insertion is also less or gone and has been meds compliant with adequate serum levels of Absolute count and has no Clozapine induced s/e till now. Clozapine level done is 218. Even on lower S. level of Clozapine the symptoms have improved considerably so he does not need to increase the Clozapine dose any further for now.  Discharge planning after new years.     12/28/23: Patient was seen today AM, chart reviewed and discussed in team. He is visible in AM, prefers to stay in bed at times, endorses limited T. Insertion or T. Withdrawal and is concerned and at the same time he added that he has almost negative Pedophilic believes now. He is due for Prolixin Decanoate 25 mg IM today 12/28/2023.     Patient continues spending more time in his bed.  He continues to denies being depressed and anxious.  He continues denying to hearing voices or seeing things..  At this time he denies also any thoughts of harming self or anybody else and denies any paranoid thinking.    Patient reports that he is compliant with medication and denies skipping them.  However he asks for his Clozaril to be increased even though he denies having any symptoms to warrant its increase.

## 2024-01-05 NOTE — CHART NOTE - NSCHARTNOTEFT_GEN_A_CORE
Pt discharged to home yesterday with aftercare scheduled at the [facility]. A Medicaid taxi was arranged for pt to ensure safe arrival home. LARON faxed DC summary to [facility] and emailed DC summary to  Carmen NATH and [facility] contact [place] via secure email for continuity of care. Pt signed consents prior to discharge. Pt discharged to home yesterday with aftercare scheduled at the [facility]. A Medicaid taxi was arranged for pt to ensure safe arrival home. LARON faxed DC summary to [facility] and emailed DC summary to  Carmen NATH and [facility] contact [place] via secure email for continuity of care. Pt signed consents prior to discharge.

## 2024-01-07 ENCOUNTER — EMERGENCY (EMERGENCY)
Facility: HOSPITAL | Age: 49
LOS: 0 days | Discharge: HOME | End: 2024-01-07
Attending: STUDENT IN AN ORGANIZED HEALTH CARE EDUCATION/TRAINING PROGRAM
Payer: MEDICARE

## 2024-01-07 VITALS
DIASTOLIC BLOOD PRESSURE: 85 MMHG | RESPIRATION RATE: 16 BRPM | OXYGEN SATURATION: 98 % | SYSTOLIC BLOOD PRESSURE: 143 MMHG | TEMPERATURE: 100 F | HEART RATE: 96 BPM

## 2024-01-07 VITALS — HEIGHT: 69 IN | WEIGHT: 250 LBS

## 2024-01-07 DIAGNOSIS — Z20.822 CONTACT WITH AND (SUSPECTED) EXPOSURE TO COVID-19: ICD-10-CM

## 2024-01-07 DIAGNOSIS — J11.1 INFLUENZA DUE TO UNIDENTIFIED INFLUENZA VIRUS WITH OTHER RESPIRATORY MANIFESTATIONS: ICD-10-CM

## 2024-01-07 DIAGNOSIS — F25.9 SCHIZOAFFECTIVE DISORDER, UNSPECIFIED: ICD-10-CM

## 2024-01-07 DIAGNOSIS — I10 ESSENTIAL (PRIMARY) HYPERTENSION: ICD-10-CM

## 2024-01-07 DIAGNOSIS — E78.5 HYPERLIPIDEMIA, UNSPECIFIED: ICD-10-CM

## 2024-01-07 LAB
ALBUMIN SERPL ELPH-MCNC: 4.4 G/DL — SIGNIFICANT CHANGE UP (ref 3.3–5)
ALBUMIN SERPL ELPH-MCNC: 4.4 G/DL — SIGNIFICANT CHANGE UP (ref 3.3–5)
ALP SERPL-CCNC: 88 U/L — SIGNIFICANT CHANGE UP (ref 40–120)
ALP SERPL-CCNC: 88 U/L — SIGNIFICANT CHANGE UP (ref 40–120)
ALT FLD-CCNC: 57 U/L — SIGNIFICANT CHANGE UP (ref 12–78)
ALT FLD-CCNC: 57 U/L — SIGNIFICANT CHANGE UP (ref 12–78)
ANION GAP SERPL CALC-SCNC: 7 MMOL/L — SIGNIFICANT CHANGE UP (ref 5–17)
ANION GAP SERPL CALC-SCNC: 7 MMOL/L — SIGNIFICANT CHANGE UP (ref 5–17)
AST SERPL-CCNC: 28 U/L — SIGNIFICANT CHANGE UP (ref 15–37)
AST SERPL-CCNC: 28 U/L — SIGNIFICANT CHANGE UP (ref 15–37)
BASOPHILS # BLD AUTO: 0.02 K/UL — SIGNIFICANT CHANGE UP (ref 0–0.2)
BASOPHILS # BLD AUTO: 0.02 K/UL — SIGNIFICANT CHANGE UP (ref 0–0.2)
BASOPHILS NFR BLD AUTO: 0.4 % — SIGNIFICANT CHANGE UP (ref 0–2)
BASOPHILS NFR BLD AUTO: 0.4 % — SIGNIFICANT CHANGE UP (ref 0–2)
BILIRUB SERPL-MCNC: 0.5 MG/DL — SIGNIFICANT CHANGE UP (ref 0.2–1.2)
BILIRUB SERPL-MCNC: 0.5 MG/DL — SIGNIFICANT CHANGE UP (ref 0.2–1.2)
BUN SERPL-MCNC: 16 MG/DL — SIGNIFICANT CHANGE UP (ref 7–23)
BUN SERPL-MCNC: 16 MG/DL — SIGNIFICANT CHANGE UP (ref 7–23)
CALCIUM SERPL-MCNC: 9.6 MG/DL — SIGNIFICANT CHANGE UP (ref 8.5–10.1)
CALCIUM SERPL-MCNC: 9.6 MG/DL — SIGNIFICANT CHANGE UP (ref 8.5–10.1)
CHLORIDE SERPL-SCNC: 104 MMOL/L — SIGNIFICANT CHANGE UP (ref 96–108)
CHLORIDE SERPL-SCNC: 104 MMOL/L — SIGNIFICANT CHANGE UP (ref 96–108)
CO2 SERPL-SCNC: 25 MMOL/L — SIGNIFICANT CHANGE UP (ref 22–31)
CO2 SERPL-SCNC: 25 MMOL/L — SIGNIFICANT CHANGE UP (ref 22–31)
CREAT SERPL-MCNC: 1.18 MG/DL — SIGNIFICANT CHANGE UP (ref 0.5–1.3)
CREAT SERPL-MCNC: 1.18 MG/DL — SIGNIFICANT CHANGE UP (ref 0.5–1.3)
EGFR: 77 ML/MIN/1.73M2 — SIGNIFICANT CHANGE UP
EGFR: 77 ML/MIN/1.73M2 — SIGNIFICANT CHANGE UP
EOSINOPHIL # BLD AUTO: 0 K/UL — SIGNIFICANT CHANGE UP (ref 0–0.5)
EOSINOPHIL # BLD AUTO: 0 K/UL — SIGNIFICANT CHANGE UP (ref 0–0.5)
EOSINOPHIL NFR BLD AUTO: 0 % — SIGNIFICANT CHANGE UP (ref 0–6)
EOSINOPHIL NFR BLD AUTO: 0 % — SIGNIFICANT CHANGE UP (ref 0–6)
FLUAV AG NPH QL: DETECTED
FLUAV AG NPH QL: DETECTED
FLUBV AG NPH QL: SIGNIFICANT CHANGE UP
FLUBV AG NPH QL: SIGNIFICANT CHANGE UP
GLUCOSE SERPL-MCNC: 174 MG/DL — HIGH (ref 70–99)
GLUCOSE SERPL-MCNC: 174 MG/DL — HIGH (ref 70–99)
HCT VFR BLD CALC: 40.4 % — SIGNIFICANT CHANGE UP (ref 39–50)
HCT VFR BLD CALC: 40.4 % — SIGNIFICANT CHANGE UP (ref 39–50)
HGB BLD-MCNC: 14.4 G/DL — SIGNIFICANT CHANGE UP (ref 13–17)
HGB BLD-MCNC: 14.4 G/DL — SIGNIFICANT CHANGE UP (ref 13–17)
IMM GRANULOCYTES NFR BLD AUTO: 0.4 % — SIGNIFICANT CHANGE UP (ref 0–0.9)
IMM GRANULOCYTES NFR BLD AUTO: 0.4 % — SIGNIFICANT CHANGE UP (ref 0–0.9)
LYMPHOCYTES # BLD AUTO: 1.08 K/UL — SIGNIFICANT CHANGE UP (ref 1–3.3)
LYMPHOCYTES # BLD AUTO: 1.08 K/UL — SIGNIFICANT CHANGE UP (ref 1–3.3)
LYMPHOCYTES # BLD AUTO: 19 % — SIGNIFICANT CHANGE UP (ref 13–44)
LYMPHOCYTES # BLD AUTO: 19 % — SIGNIFICANT CHANGE UP (ref 13–44)
MCHC RBC-ENTMCNC: 30.4 PG — SIGNIFICANT CHANGE UP (ref 27–34)
MCHC RBC-ENTMCNC: 30.4 PG — SIGNIFICANT CHANGE UP (ref 27–34)
MCHC RBC-ENTMCNC: 35.6 GM/DL — SIGNIFICANT CHANGE UP (ref 32–36)
MCHC RBC-ENTMCNC: 35.6 GM/DL — SIGNIFICANT CHANGE UP (ref 32–36)
MCV RBC AUTO: 85.2 FL — SIGNIFICANT CHANGE UP (ref 80–100)
MCV RBC AUTO: 85.2 FL — SIGNIFICANT CHANGE UP (ref 80–100)
MONOCYTES # BLD AUTO: 0.74 K/UL — SIGNIFICANT CHANGE UP (ref 0–0.9)
MONOCYTES # BLD AUTO: 0.74 K/UL — SIGNIFICANT CHANGE UP (ref 0–0.9)
MONOCYTES NFR BLD AUTO: 13.1 % — SIGNIFICANT CHANGE UP (ref 2–14)
MONOCYTES NFR BLD AUTO: 13.1 % — SIGNIFICANT CHANGE UP (ref 2–14)
NEUTROPHILS # BLD AUTO: 3.81 K/UL — SIGNIFICANT CHANGE UP (ref 1.8–7.4)
NEUTROPHILS # BLD AUTO: 3.81 K/UL — SIGNIFICANT CHANGE UP (ref 1.8–7.4)
NEUTROPHILS NFR BLD AUTO: 67.1 % — SIGNIFICANT CHANGE UP (ref 43–77)
NEUTROPHILS NFR BLD AUTO: 67.1 % — SIGNIFICANT CHANGE UP (ref 43–77)
PLATELET # BLD AUTO: 217 K/UL — SIGNIFICANT CHANGE UP (ref 150–400)
PLATELET # BLD AUTO: 217 K/UL — SIGNIFICANT CHANGE UP (ref 150–400)
POTASSIUM SERPL-MCNC: 3.6 MMOL/L — SIGNIFICANT CHANGE UP (ref 3.5–5.3)
POTASSIUM SERPL-MCNC: 3.6 MMOL/L — SIGNIFICANT CHANGE UP (ref 3.5–5.3)
POTASSIUM SERPL-SCNC: 3.6 MMOL/L — SIGNIFICANT CHANGE UP (ref 3.5–5.3)
POTASSIUM SERPL-SCNC: 3.6 MMOL/L — SIGNIFICANT CHANGE UP (ref 3.5–5.3)
PROT SERPL-MCNC: 8.2 GM/DL — SIGNIFICANT CHANGE UP (ref 6–8.3)
PROT SERPL-MCNC: 8.2 GM/DL — SIGNIFICANT CHANGE UP (ref 6–8.3)
RBC # BLD: 4.74 M/UL — SIGNIFICANT CHANGE UP (ref 4.2–5.8)
RBC # BLD: 4.74 M/UL — SIGNIFICANT CHANGE UP (ref 4.2–5.8)
RBC # FLD: 12.4 % — SIGNIFICANT CHANGE UP (ref 10.3–14.5)
RBC # FLD: 12.4 % — SIGNIFICANT CHANGE UP (ref 10.3–14.5)
RSV RNA NPH QL NAA+NON-PROBE: SIGNIFICANT CHANGE UP
RSV RNA NPH QL NAA+NON-PROBE: SIGNIFICANT CHANGE UP
SARS-COV-2 RNA SPEC QL NAA+PROBE: SIGNIFICANT CHANGE UP
SARS-COV-2 RNA SPEC QL NAA+PROBE: SIGNIFICANT CHANGE UP
SODIUM SERPL-SCNC: 136 MMOL/L — SIGNIFICANT CHANGE UP (ref 135–145)
SODIUM SERPL-SCNC: 136 MMOL/L — SIGNIFICANT CHANGE UP (ref 135–145)
WBC # BLD: 5.67 K/UL — SIGNIFICANT CHANGE UP (ref 3.8–10.5)
WBC # BLD: 5.67 K/UL — SIGNIFICANT CHANGE UP (ref 3.8–10.5)
WBC # FLD AUTO: 5.67 K/UL — SIGNIFICANT CHANGE UP (ref 3.8–10.5)
WBC # FLD AUTO: 5.67 K/UL — SIGNIFICANT CHANGE UP (ref 3.8–10.5)

## 2024-01-07 PROCEDURE — 80053 COMPREHEN METABOLIC PANEL: CPT

## 2024-01-07 PROCEDURE — 36415 COLL VENOUS BLD VENIPUNCTURE: CPT

## 2024-01-07 PROCEDURE — 71046 X-RAY EXAM CHEST 2 VIEWS: CPT

## 2024-01-07 PROCEDURE — 99283 EMERGENCY DEPT VISIT LOW MDM: CPT | Mod: 25

## 2024-01-07 PROCEDURE — 71046 X-RAY EXAM CHEST 2 VIEWS: CPT | Mod: 26

## 2024-01-07 PROCEDURE — 94640 AIRWAY INHALATION TREATMENT: CPT

## 2024-01-07 PROCEDURE — 85025 COMPLETE CBC W/AUTO DIFF WBC: CPT

## 2024-01-07 PROCEDURE — 99284 EMERGENCY DEPT VISIT MOD MDM: CPT | Mod: FS

## 2024-01-07 PROCEDURE — 0241U: CPT

## 2024-01-07 RX ORDER — ACETAMINOPHEN 500 MG
1000 TABLET ORAL ONCE
Refills: 0 | Status: COMPLETED | OUTPATIENT
Start: 2024-01-07 | End: 2024-01-07

## 2024-01-07 RX ORDER — SODIUM CHLORIDE 9 MG/ML
1000 INJECTION INTRAMUSCULAR; INTRAVENOUS; SUBCUTANEOUS ONCE
Refills: 0 | Status: COMPLETED | OUTPATIENT
Start: 2024-01-07 | End: 2024-01-07

## 2024-01-07 RX ADMIN — SODIUM CHLORIDE 1000 MILLILITER(S): 9 INJECTION INTRAMUSCULAR; INTRAVENOUS; SUBCUTANEOUS at 17:46

## 2024-01-07 RX ADMIN — SODIUM CHLORIDE 1000 MILLILITER(S): 9 INJECTION INTRAMUSCULAR; INTRAVENOUS; SUBCUTANEOUS at 16:38

## 2024-01-07 RX ADMIN — Medication 1000 MILLIGRAM(S): at 17:11

## 2024-01-07 RX ADMIN — Medication 1000 MILLIGRAM(S): at 17:46

## 2024-01-07 NOTE — ED STATDOCS - PROGRESS NOTE DETAILS
Pt. is a 48 year old male Hx schizoaffective disorder presenting with cough, fever, nalaise.  REcent hospitalization and tarted on Clozapine.  He denies SOB, CP, sick contacts.  PT. feeling tired after starting meds.  NEg. SI/HI.  No other complaints.  Chanell Cheng PA-C Pt. is a 48 year old male Hx schizoaffective disorder presenting with cough, fever, malaise.  Recent hospitalization and started on Clozapine.  He denies SOB, CP, sick contacts.  PT. feeling tired after starting meds.  Neg. SI/HI.  No other complaints.  Chanell Cheng PA-C Pt. febrile Tylenol ordered.  CXR negative for infiltrate.  Chanell Cheng PA-C Pt. febrile Tylenol ordered.  Fever 102.4   CXR negative for infiltrate.  Chanell Cheng PA-C + influenza.  Supportive care at home.  Follow with Dr. Burks.  Chanell Cheng PA-C

## 2024-01-07 NOTE — ED ADULT TRIAGE NOTE - CHIEF COMPLAINT QUOTE
Pt ambulatory to ED c/o cough and URI symptoms x2 days. Pt dc home on 1/4 from  admitted for paranoia and hallucinations, PMH Schizoaffective disorder. Denies SI/HI in triage.

## 2024-01-07 NOTE — ED STATDOCS - CLINICAL SUMMARY MEDICAL DECISION MAKING FREE TEXT BOX
49 y/o male presents w/ upper respiratory complaints, recently started on Clozaril. Plan for labs r/o leukocytosis, XR, RVP, and reassess. 47 y/o male presents w/ upper respiratory complaints, recently started on Clozaril. Plan for labs r/o leukocytosis, XR, RVP, and reassess.            + influenza.  Supportive care at home.  Follow with Dr. Burks.  Chanell Cheng PA-C 49 y/o male presents w/ upper respiratory complaints, recently started on Clozaril. Plan for labs r/o leukocytosis, XR, RVP, and reassess.            + influenza.  Supportive care at home.  Follow with Dr. Burks.  Chanell Cheng PA-C

## 2024-01-07 NOTE — ED STATDOCS - OBJECTIVE STATEMENT
47 y/o male w/ a PMHx of schizoaffective disorder presents to the ED c/o cough, fever, chills, and malaise x2 days. Denies CP, SOB, sick contacts or recent travel. Pt states he was recently started on Clozapine, states he feels more tired since being on it. Denies SI/HI or hallucinations. No other complaints at this time.

## 2024-01-07 NOTE — ED ADULT NURSE NOTE - NSFALLUNIVINTERV_ED_ALL_ED
Bed/Stretcher in lowest position, wheels locked, appropriate side rails in place/Call bell, personal items and telephone in reach/Instruct patient to call for assistance before getting out of bed/chair/stretcher/Non-slip footwear applied when patient is off stretcher/[place] to call system/Physically safe environment - no spills, clutter or unnecessary equipment/Purposeful proactive rounding/Room/bathroom lighting operational, light cord in reach Bed/Stretcher in lowest position, wheels locked, appropriate side rails in place/Call bell, personal items and telephone in reach/Instruct patient to call for assistance before getting out of bed/chair/stretcher/Non-slip footwear applied when patient is off stretcher/[place] to call system/Physically safe environment - no spills, clutter or unnecessary equipment/Purposeful proactive rounding/Room/bathroom lighting operational, light cord in reach

## 2024-01-07 NOTE — ED STATDOCS - NSFOLLOWUPCLINICS_GEN_ALL_ED_FT
[facility]  Family Medicine  [address] [place] [address]  Phone: [phone]  Fax:      [facility]  Family Medicine  [address] [place] [address]  Phone: [phone]  Fax:

## 2024-01-07 NOTE — ED ADULT NURSE NOTE - OBJECTIVE STATEMENT
pt in ed c/o of flu like symptoms. pt reports chills, cough after being discharged from  recently. piv inserted. blood work sent.

## 2024-01-07 NOTE — ED STATDOCS - PATIENT PORTAL LINK FT
You can access the FollowMyHealth Patient Portal offered by [facility] by registering at the following website: [email or web address]. By joining [employer]’s FollowMyHealth portal, you will also be able to view your health information using other applications (apps) compatible with our system. You can access the FollowMyHealth Patient Portal offered by [facility] by registering at the following website: [email or web address]. By joining [employer]’s FollowMyHealth portal, you will also be able to view your health information using other applications (apps) compatible with our system.

## 2024-01-23 ENCOUNTER — APPOINTMENT (OUTPATIENT)
Dept: FAMILY MEDICINE | Facility: CLINIC | Age: 49
End: 2024-01-23
Payer: MEDICARE

## 2024-01-23 VITALS
DIASTOLIC BLOOD PRESSURE: 80 MMHG | OXYGEN SATURATION: 98 % | HEART RATE: 119 BPM | TEMPERATURE: 98 F | RESPIRATION RATE: 17 BRPM | SYSTOLIC BLOOD PRESSURE: 120 MMHG

## 2024-01-23 DIAGNOSIS — M54.50 LOW BACK PAIN, UNSPECIFIED: ICD-10-CM

## 2024-01-23 DIAGNOSIS — E11.9 TYPE 2 DIABETES MELLITUS W/OUT COMPLICATIONS: ICD-10-CM

## 2024-01-23 PROCEDURE — 99214 OFFICE O/P EST MOD 30 MIN: CPT

## 2024-01-23 RX ORDER — IBUPROFEN 600 MG/1
600 TABLET, FILM COATED ORAL EVERY 8 HOURS
Qty: 42 | Refills: 0 | Status: COMPLETED | COMMUNITY
Start: 2023-05-11 | End: 2024-01-23

## 2024-01-23 RX ORDER — CLOZAPINE 200 MG/1
TABLET ORAL
Refills: 0 | Status: COMPLETED | COMMUNITY
End: 2024-01-23

## 2024-01-23 RX ORDER — NYSTATIN 100000 1/G
100000 POWDER TOPICAL TWICE DAILY
Qty: 2 | Refills: 2 | Status: COMPLETED | COMMUNITY
Start: 2023-05-11 | End: 2024-01-23

## 2024-01-23 RX ORDER — QUETIAPINE 200 MG/1
200 TABLET, FILM COATED ORAL
Refills: 0 | Status: COMPLETED | COMMUNITY
Start: 2023-12-06 | End: 2024-01-23

## 2024-01-23 RX ORDER — FENOFIBRATE 145 MG/1
TABLET ORAL
Refills: 0 | Status: COMPLETED | COMMUNITY
End: 2024-01-23

## 2024-01-23 NOTE — PLAN
[FreeTextEntry1] : patient reports occasional episodes of palpations none reported today during exam. referral given for cardiologist  assisted in scheduling appointment if s/s worsen go to the ER

## 2024-01-23 NOTE — PHYSICAL EXAM
[Tachycardia] : tachycardic [Normal S2] : normal S2 [Normal S1] : normal S1 [No Murmur] : no murmurs heard [Normal Affect] : the affect was normal [Normal Mood] : the mood was normal [Normal Insight/Judgement] : insight and judgment were intact [Normal] : affect was normal and insight and judgment were intact

## 2024-01-23 NOTE — HISTORY OF PRESENT ILLNESS
[FreeTextEntry1] : follow up lower back pain and medication management  [de-identified] : 48 y.o M with pmhx of DM2, HTN, schizophrenia, and HLD, patient reports lower back pain and elevated BP when going to other medical offices. patient have not had a chance to get his Dexa scan done. Patient reports an increase in his lower back pain and would like to do something about it. patient states the pain is localized in the lower back with no radiation to the legs. Patient has been decreasing activity level due to the pain and has not been taking any pain medication. patient denies any trouble with urination or bowel movements. Today patient reports no palpitations, no SOB, no chest pain, no fever, no acute distress.

## 2024-01-29 ENCOUNTER — OUTPATIENT (OUTPATIENT)
Dept: OUTPATIENT SERVICES | Facility: HOSPITAL | Age: 49
LOS: 1 days | End: 2024-01-29
Payer: MEDICARE

## 2024-01-29 ENCOUNTER — APPOINTMENT (OUTPATIENT)
Dept: RADIOLOGY | Facility: CLINIC | Age: 49
End: 2024-01-29
Payer: MEDICARE

## 2024-01-29 DIAGNOSIS — M54.50 LOW BACK PAIN, UNSPECIFIED: ICD-10-CM

## 2024-01-29 PROCEDURE — 77080 DXA BONE DENSITY AXIAL: CPT

## 2024-01-29 PROCEDURE — 72100 X-RAY EXAM L-S SPINE 2/3 VWS: CPT | Mod: 26

## 2024-01-29 PROCEDURE — 77080 DXA BONE DENSITY AXIAL: CPT | Mod: 26

## 2024-01-29 PROCEDURE — 72100 X-RAY EXAM L-S SPINE 2/3 VWS: CPT

## 2024-01-31 DIAGNOSIS — M47.816 SPONDYLOSIS W/OUT MYELOPATHY OR RADICULOPATHY, LUMBAR REGION: ICD-10-CM

## 2024-02-06 RX ORDER — METFORMIN HYDROCHLORIDE 1000 MG/1
1000 TABLET, EXTENDED RELEASE ORAL DAILY
Qty: 90 | Refills: 0 | Status: COMPLETED | COMMUNITY
End: 2024-02-06

## 2024-02-08 ENCOUNTER — NON-APPOINTMENT (OUTPATIENT)
Age: 49
End: 2024-02-08

## 2024-02-08 ENCOUNTER — APPOINTMENT (OUTPATIENT)
Dept: CARDIOLOGY | Facility: CLINIC | Age: 49
End: 2024-02-08
Payer: MEDICARE

## 2024-02-08 VITALS — BODY MASS INDEX: 36 KG/M2 | HEIGHT: 66 IN | WEIGHT: 224 LBS

## 2024-02-08 VITALS — OXYGEN SATURATION: 97 % | HEART RATE: 112 BPM | SYSTOLIC BLOOD PRESSURE: 130 MMHG | DIASTOLIC BLOOD PRESSURE: 80 MMHG

## 2024-02-08 VITALS — SYSTOLIC BLOOD PRESSURE: 116 MMHG | OXYGEN SATURATION: 100 % | DIASTOLIC BLOOD PRESSURE: 71 MMHG | HEART RATE: 74 BPM

## 2024-02-08 VITALS — BODY MASS INDEX: 37.44 KG/M2 | HEIGHT: 68 IN | WEIGHT: 247 LBS

## 2024-02-08 DIAGNOSIS — Z78.9 OTHER SPECIFIED HEALTH STATUS: ICD-10-CM

## 2024-02-08 PROCEDURE — 93242 EXT ECG>48HR<7D RECORDING: CPT

## 2024-02-08 PROCEDURE — 93000 ELECTROCARDIOGRAM COMPLETE: CPT

## 2024-02-08 PROCEDURE — 99203 OFFICE O/P NEW LOW 30 MIN: CPT

## 2024-02-08 RX ORDER — OMEGA-3/DHA/EPA/FISH OIL 1200 MG
1200 CAPSULE ORAL
Qty: 90 | Refills: 0 | Status: DISCONTINUED | COMMUNITY
Start: 2023-06-08 | End: 2024-02-08

## 2024-02-08 RX ORDER — FLUPHENAZINE ENANTHATE 25 MG/ML
25 VIAL (ML) INJECTION
Refills: 0 | Status: DISCONTINUED | COMMUNITY
End: 2024-02-08

## 2024-02-08 RX ORDER — SITAGLIPTIN 100 MG/1
100 TABLET, FILM COATED ORAL DAILY
Qty: 30 | Refills: 0 | Status: DISCONTINUED | COMMUNITY
Start: 2023-12-06 | End: 2024-02-08

## 2024-02-12 RX ORDER — FLUPHENAZINE DECANOATE 25 MG/ML
25 INJECTION, SOLUTION INTRAMUSCULAR; SUBCUTANEOUS
Refills: 0 | Status: ACTIVE | COMMUNITY
Start: 2023-07-03

## 2024-02-12 RX ORDER — CLOZAPINE 200 MG/1
200 TABLET ORAL DAILY
Refills: 0 | Status: ACTIVE | COMMUNITY
Start: 2023-06-12

## 2024-02-12 RX ORDER — DIVALPROEX SODIUM 500 MG/1
500 TABLET, DELAYED RELEASE ORAL
Refills: 0 | Status: ACTIVE | COMMUNITY

## 2024-02-16 NOTE — ASSESSMENT
[FreeTextEntry1] : EKG 2/8/2024- Sinus Tachycardia  WITHIN NORMAL LIMITS  Assessment: 1.  Intermittent palpitations 2.  Type 2 diabetes mellitus 3.  Hypertension/hyperlipidemia 4.  Smoking history and other medical problems as noted in the chart  Recommendations: 1.  Echocardiogram and regular stress test 2.  Holter monitor for 72 hours- was placed today 3.  Follow-up in 2 months

## 2024-02-16 NOTE — HISTORY OF PRESENT ILLNESS
[FreeTextEntry1] : HPI:  Patient is a 48 y.o M with pmhx of DM2, HTN, schizophrenia, and HLD Presents for cardiac evaluation.  Patient occasionally feels racing of the heart beat.  Patient denies any chest pain dyspnea.  Patient denies orthopnea, PND or leg edema     PMH:Type 2 diabetes mellitus, hypertension, schizophrenia, hyperlipidemia.  Social History: Positive for smoking history, denies any alcohol or substance abuse  Family history: Noncontributory.

## 2024-02-27 ENCOUNTER — APPOINTMENT (OUTPATIENT)
Dept: CARDIOLOGY | Facility: CLINIC | Age: 49
End: 2024-02-27
Payer: MEDICARE

## 2024-02-27 PROCEDURE — 93306 TTE W/DOPPLER COMPLETE: CPT

## 2024-02-27 PROCEDURE — 93015 CV STRESS TEST SUPVJ I&R: CPT

## 2024-04-09 NOTE — ED ADULT NURSE NOTE - AS PAIN REST
Patient: Tere Hoyt    Procedure: Procedure(s):  ESOPHAGOGASTRODUODENOSCOPY       Anesthesia Type:  MAC    Note:  Disposition: Inpatient   Postop Pain Control: Uneventful            Sign Out: Well controlled pain   PONV: No   Neuro/Psych: Uneventful            Sign Out: Acceptable/Baseline neuro status   Airway/Respiratory: Uneventful            Sign Out: Acceptable/Baseline resp. status   CV/Hemodynamics: Uneventful            Sign Out: Acceptable CV status; No obvious hypovolemia; No obvious fluid overload   Other NRE: NONE   DID A NON-ROUTINE EVENT OCCUR? No           Last vitals:  Vitals:    04/09/24 1100 04/09/24 1130 04/09/24 1200   BP: 106/57 102/63 111/58   Pulse: 96 109 103   Resp: 16 18 17   Temp:      SpO2: 98% 98% 97%       Electronically Signed By: Drew Evans MD  April 9, 2024  12:53 PM   0 (no pain/absence of nonverbal indicators of pain)

## 2024-04-11 ENCOUNTER — APPOINTMENT (OUTPATIENT)
Dept: CARDIOLOGY | Facility: CLINIC | Age: 49
End: 2024-04-11
Payer: MEDICARE

## 2024-04-11 VITALS — SYSTOLIC BLOOD PRESSURE: 128 MMHG | DIASTOLIC BLOOD PRESSURE: 70 MMHG

## 2024-04-11 VITALS
DIASTOLIC BLOOD PRESSURE: 86 MMHG | TEMPERATURE: 98.6 F | WEIGHT: 245 LBS | HEART RATE: 96 BPM | OXYGEN SATURATION: 96 % | BODY MASS INDEX: 37.13 KG/M2 | SYSTOLIC BLOOD PRESSURE: 145 MMHG | HEIGHT: 68 IN

## 2024-04-11 DIAGNOSIS — E78.5 HYPERLIPIDEMIA, UNSPECIFIED: ICD-10-CM

## 2024-04-11 DIAGNOSIS — R00.2 PALPITATIONS: ICD-10-CM

## 2024-04-11 DIAGNOSIS — I10 ESSENTIAL (PRIMARY) HYPERTENSION: ICD-10-CM

## 2024-04-11 PROCEDURE — 99214 OFFICE O/P EST MOD 30 MIN: CPT

## 2024-04-11 RX ORDER — METFORMIN HYDROCHLORIDE 1000 MG/1
1000 TABLET, COATED ORAL
Refills: 0 | Status: ACTIVE | COMMUNITY

## 2024-04-11 RX ORDER — METFORMIN HYDROCHLORIDE 1000 MG/1
1000 TABLET, COATED ORAL DAILY
Qty: 90 | Refills: 0 | Status: DISCONTINUED | COMMUNITY
Start: 2024-02-06 | End: 2024-04-11

## 2024-04-25 NOTE — BH INPATIENT PSYCHIATRY PROGRESS NOTE - NSTXDCFAMPROGRES_PSY_ALL_CORE
Met - goal discontinued
Met - goal discontinued
Male
Met - goal discontinued

## 2024-06-18 RX ORDER — ATORVASTATIN CALCIUM 40 MG/1
40 TABLET, FILM COATED ORAL
Qty: 90 | Refills: 0 | Status: ACTIVE | COMMUNITY
Start: 2023-09-13 | End: 1900-01-01

## 2024-06-18 RX ORDER — LISINOPRIL 10 MG/1
10 TABLET ORAL DAILY
Qty: 3 | Refills: 0 | Status: ACTIVE | COMMUNITY
Start: 2023-05-11 | End: 1900-01-01

## 2024-09-10 DIAGNOSIS — E78.5 HYPERLIPIDEMIA, UNSPECIFIED: ICD-10-CM

## 2024-09-18 RX ORDER — SITAGLIPTIN AND METFORMIN HYDROCHLORIDE 50; 1000 MG/1; MG/1
50-1000 TABLET, FILM COATED ORAL
Refills: 0 | Status: ACTIVE | COMMUNITY
Start: 2024-09-18

## 2024-11-28 NOTE — DIETITIAN INITIAL EVALUATION ADULT - PATIENT MEETS CRITERIA FOR MALNUTRITION
Lenny Brooks MD  Signing Provider:  Lenny Brooks MD    SM/AQT  D:  11/28/2024 10:57:51 AM  T:  11/28/2024 11:27:19 AM  Job:  [id number]        no

## 2024-12-20 NOTE — BH INPATIENT PSYCHIATRY DISCHARGE NOTE - NSBHDCBILLING_PSY_ALL_CORE
51926 (Hospital discharge day management; more than 30 min) 35944 (Hospital discharge day management; more than 30 min) yes

## 2025-03-13 ENCOUNTER — APPOINTMENT (OUTPATIENT)
Dept: FAMILY MEDICINE | Facility: CLINIC | Age: 50
End: 2025-03-13
Payer: MEDICARE

## 2025-03-13 VITALS
BODY MASS INDEX: 35.77 KG/M2 | HEART RATE: 90 BPM | OXYGEN SATURATION: 98 % | DIASTOLIC BLOOD PRESSURE: 70 MMHG | TEMPERATURE: 98 F | SYSTOLIC BLOOD PRESSURE: 120 MMHG | RESPIRATION RATE: 16 BRPM | HEIGHT: 68 IN | WEIGHT: 236 LBS

## 2025-03-13 DIAGNOSIS — F20.9 SCHIZOPHRENIA, UNSPECIFIED: ICD-10-CM

## 2025-03-13 DIAGNOSIS — E11.9 TYPE 2 DIABETES MELLITUS W/OUT COMPLICATIONS: ICD-10-CM

## 2025-03-13 DIAGNOSIS — Z00.00 ENCOUNTER FOR GENERAL ADULT MEDICAL EXAMINATION W/OUT ABNORMAL FINDINGS: ICD-10-CM

## 2025-03-13 DIAGNOSIS — Z12.11 ENCOUNTER FOR SCREENING FOR MALIGNANT NEOPLASM OF COLON: ICD-10-CM

## 2025-03-13 PROCEDURE — G0444 DEPRESSION SCREEN ANNUAL: CPT | Mod: 59

## 2025-03-13 PROCEDURE — 99396 PREV VISIT EST AGE 40-64: CPT

## 2025-04-08 ENCOUNTER — NON-APPOINTMENT (OUTPATIENT)
Age: 50
End: 2025-04-08

## 2025-04-10 ENCOUNTER — APPOINTMENT (OUTPATIENT)
Dept: CARDIOLOGY | Facility: CLINIC | Age: 50
End: 2025-04-10
Payer: MEDICARE

## 2025-04-10 ENCOUNTER — NON-APPOINTMENT (OUTPATIENT)
Age: 50
End: 2025-04-10

## 2025-04-10 VITALS
HEART RATE: 96 BPM | OXYGEN SATURATION: 95 % | WEIGHT: 236 LBS | SYSTOLIC BLOOD PRESSURE: 120 MMHG | HEIGHT: 68 IN | DIASTOLIC BLOOD PRESSURE: 80 MMHG | BODY MASS INDEX: 35.77 KG/M2

## 2025-04-10 DIAGNOSIS — E78.5 HYPERLIPIDEMIA, UNSPECIFIED: ICD-10-CM

## 2025-04-10 DIAGNOSIS — I10 ESSENTIAL (PRIMARY) HYPERTENSION: ICD-10-CM

## 2025-04-10 PROCEDURE — 93000 ELECTROCARDIOGRAM COMPLETE: CPT

## 2025-04-10 PROCEDURE — 99213 OFFICE O/P EST LOW 20 MIN: CPT | Mod: 25

## 2025-04-23 ENCOUNTER — OFFICE (OUTPATIENT)
Dept: URBAN - METROPOLITAN AREA CLINIC 94 | Facility: CLINIC | Age: 50
Setting detail: OPHTHALMOLOGY
End: 2025-04-23
Payer: MEDICARE

## 2025-04-23 DIAGNOSIS — E11.9: ICD-10-CM

## 2025-04-23 DIAGNOSIS — H04.123: ICD-10-CM

## 2025-04-23 DIAGNOSIS — H35.033: ICD-10-CM

## 2025-04-23 PROCEDURE — 92012 INTRM OPH EXAM EST PATIENT: CPT | Performed by: OPHTHALMOLOGY

## 2025-04-23 PROCEDURE — 92250 FUNDUS PHOTOGRAPHY W/I&R: CPT | Performed by: OPHTHALMOLOGY

## 2025-04-23 ASSESSMENT — REFRACTION_MANIFEST
OD_VA1: 20/25-1
OS_SPHERE: -4.50
OD_ADD: +1.50
OS_VA1: 20/25
OD_AXIS: 015
OD_SPHERE: -5.00
OS_VA1: 20/25-2
OS_VA1: 20/25-2
OS_SPHERE: -4.50
OS_AXIS: 155
OS_SPHERE: -4.50
OD_AXIS: 015
OS_CYLINDER: -4.25
OD_VA1: 20/25-1
OS_AXIS: 160
OD_AXIS: 013
OS_CYLINDER: -4.00
OD_VA1: 20/25
OU_VA: 20/20
OS_ADD: +1.50
OD_CYLINDER: -5.25
OD_CYLINDER: -5.50
OD_CYLINDER: -5.50
OD_SPHERE: -5.00
OD_SPHERE: -4.75
OS_AXIS: 160
OS_CYLINDER: -4.25

## 2025-04-23 ASSESSMENT — REFRACTION_CURRENTRX
OD_AXIS: 12
OD_VPRISM_DIRECTION: SV
OS_AXIS: 173
OS_AXIS: 141
OD_OVR_VA: 20/
OS_OVR_VA: 20/
OS_SPHERE: -5.00
OS_CYLINDER: -4.50
OD_OVR_VA: 20/
OD_VPRISM_DIRECTION: SV
OS_CYLINDER: -4.75
OS_OVR_VA: 20/
OS_VPRISM_DIRECTION: SV
OS_SPHERE: -4.25
OD_SPHERE: -4.75
OD_AXIS: 017
OS_VPRISM_DIRECTION: SV
OD_CYLINDER: -5.50
OD_SPHERE: -5.25
OD_CYLINDER: -5.50

## 2025-04-23 ASSESSMENT — KERATOMETRY
OD_K2POWER_DIOPTERS: 45.25
OD_K1POWER_DIOPTERS: 44.75
OS_K2POWER_DIOPTERS: 49.25
OS_AXISANGLE_DEGREES: 060
OS_K1POWER_DIOPTERS: 45.25
OD_AXISANGLE_DEGREES: 174
METHOD_AUTO_MANUAL: AUTO

## 2025-04-23 ASSESSMENT — REFRACTION_AUTOREFRACTION
OS_AXIS: 150
OD_SPHERE: -5.25
OD_CYLINDER: -5.50
OD_AXIS: 014
OS_SPHERE: -4.75
OS_CYLINDER: -4.50

## 2025-04-23 ASSESSMENT — CONFRONTATIONAL VISUAL FIELD TEST (CVF)
OS_FINDINGS: FULL
OD_FINDINGS: FULL

## 2025-04-23 ASSESSMENT — VISUAL ACUITY
OD_BCVA: 20/20-1
OS_BCVA: 20/25

## 2025-04-23 ASSESSMENT — TONOMETRY
OS_IOP_MMHG: 21
OD_IOP_MMHG: 21

## 2025-04-23 ASSESSMENT — SUPERFICIAL PUNCTATE KERATITIS (SPK)
OD_SPK: 1+
OS_SPK: 1+

## 2025-05-13 ENCOUNTER — APPOINTMENT (OUTPATIENT)
Dept: CT IMAGING | Facility: CLINIC | Age: 50
End: 2025-05-13
Payer: MEDICARE

## 2025-05-13 ENCOUNTER — OUTPATIENT (OUTPATIENT)
Dept: OUTPATIENT SERVICES | Facility: HOSPITAL | Age: 50
LOS: 1 days | End: 2025-05-13
Payer: MEDICARE

## 2025-05-13 DIAGNOSIS — I10 ESSENTIAL (PRIMARY) HYPERTENSION: ICD-10-CM

## 2025-05-13 PROCEDURE — 75571 CT HRT W/O DYE W/CA TEST: CPT

## 2025-05-13 PROCEDURE — 75571 CT HRT W/O DYE W/CA TEST: CPT | Mod: 26

## 2025-05-21 ENCOUNTER — APPOINTMENT (OUTPATIENT)
Dept: GASTROENTEROLOGY | Facility: CLINIC | Age: 50
End: 2025-05-21
Payer: MEDICARE

## 2025-05-21 VITALS
HEIGHT: 68 IN | WEIGHT: 232 LBS | HEART RATE: 98 BPM | OXYGEN SATURATION: 97 % | DIASTOLIC BLOOD PRESSURE: 81 MMHG | RESPIRATION RATE: 14 BRPM | SYSTOLIC BLOOD PRESSURE: 140 MMHG | BODY MASS INDEX: 35.16 KG/M2

## 2025-05-21 DIAGNOSIS — I10 ESSENTIAL (PRIMARY) HYPERTENSION: ICD-10-CM

## 2025-05-21 DIAGNOSIS — E11.9 TYPE 2 DIABETES MELLITUS W/OUT COMPLICATIONS: ICD-10-CM

## 2025-05-21 DIAGNOSIS — Z86.79 PERSONAL HISTORY OF OTHER DISEASES OF THE CIRCULATORY SYSTEM: ICD-10-CM

## 2025-05-21 DIAGNOSIS — Z82.62 FAMILY HISTORY OF OSTEOPOROSIS: ICD-10-CM

## 2025-05-21 DIAGNOSIS — F20.9 SCHIZOPHRENIA, UNSPECIFIED: ICD-10-CM

## 2025-05-21 DIAGNOSIS — Z12.11 ENCOUNTER FOR SCREENING FOR MALIGNANT NEOPLASM OF COLON: ICD-10-CM

## 2025-05-21 DIAGNOSIS — Z80.42 FAMILY HISTORY OF MALIGNANT NEOPLASM OF PROSTATE: ICD-10-CM

## 2025-05-21 DIAGNOSIS — E66.9 OBESITY, UNSPECIFIED: ICD-10-CM

## 2025-05-21 DIAGNOSIS — R11.10 VOMITING, UNSPECIFIED: ICD-10-CM

## 2025-05-21 DIAGNOSIS — Z86.39 PERSONAL HISTORY OF OTHER ENDOCRINE, NUTRITIONAL AND METABOLIC DISEASE: ICD-10-CM

## 2025-05-21 DIAGNOSIS — R11.14 BILIOUS VOMITING: ICD-10-CM

## 2025-05-21 DIAGNOSIS — E78.5 HYPERLIPIDEMIA, UNSPECIFIED: ICD-10-CM

## 2025-05-21 DIAGNOSIS — K64.4 RESIDUAL HEMORRHOIDAL SKIN TAGS: ICD-10-CM

## 2025-05-21 PROCEDURE — 99204 OFFICE O/P NEW MOD 45 MIN: CPT

## 2025-05-21 PROCEDURE — G2211 COMPLEX E/M VISIT ADD ON: CPT

## 2025-05-21 RX ORDER — POLYETHYLENE GLYCOL 3350, SODIUM SULFATE, POTASSIUM CHLORIDE, MAGNESIUM SULFATE, AND SODIUM CHLORIDE FOR ORAL SOLUTION 178.7-7.3G
178.7 KIT ORAL
Qty: 1 | Refills: 0 | Status: ACTIVE | COMMUNITY
Start: 2025-05-21 | End: 1900-01-01

## 2025-05-24 PROBLEM — Z86.39 HISTORY OF DIABETES MELLITUS: Status: RESOLVED | Noted: 2023-05-11 | Resolved: 2025-05-24

## 2025-05-24 PROBLEM — Z86.79 HISTORY OF HYPERTENSION: Status: RESOLVED | Noted: 2023-05-11 | Resolved: 2025-05-24

## 2025-05-24 PROBLEM — Z86.39 HISTORY OF HIGH CHOLESTEROL: Status: RESOLVED | Noted: 2023-05-11 | Resolved: 2025-05-24

## 2025-06-19 ENCOUNTER — APPOINTMENT (OUTPATIENT)
Dept: CARDIOLOGY | Facility: CLINIC | Age: 50
End: 2025-06-19
Payer: MEDICARE

## 2025-06-19 VITALS
HEART RATE: 104 BPM | OXYGEN SATURATION: 95 % | HEIGHT: 68 IN | BODY MASS INDEX: 34.4 KG/M2 | DIASTOLIC BLOOD PRESSURE: 74 MMHG | SYSTOLIC BLOOD PRESSURE: 126 MMHG | WEIGHT: 227 LBS

## 2025-06-19 PROCEDURE — 99213 OFFICE O/P EST LOW 20 MIN: CPT | Mod: 25

## 2025-06-19 PROCEDURE — 93000 ELECTROCARDIOGRAM COMPLETE: CPT

## 2025-07-04 NOTE — BH INPATIENT PSYCHIATRY PROGRESS NOTE - NSBHASSESSSUMMFT_PSY_ALL_CORE
[Mother] : mother [Normal] : Normal [Special Education] : receives special education  [Parent/teacher concerns] : Parent/teacher concerns [No] : No cigarette smoke exposure [Water heater temperature set at <120 degrees F] : Water heater temperature set at <120 degrees F [Car seat in back seat] : Car seat in back seat [Carbon Monoxide Detectors] : Carbon monoxide detectors [Smoke Detectors] : Smoke detectors [Supervised outdoor play] : Supervised outdoor play 48 year old single, domiciled [place] housing, unemployed, on disability. PPHx of schizophrenia, anxiety multiple prior admissions last in 2012 at [facility]. Was previously on Clozaril 50 mg, doing well aside from feeling tired, changed psychiatrist in July and is now on Depakote and Seroquel, reports symptoms have worsened on this regimen. PMHx of HTN, HDL and restless leg who self presents to hospital for psychiatric evaluation.  Patient presents with Schizoaffective disorder with exacerbation of symptoms since recent medication change. No SI/HI. +AH, not command in nature. +Vague paranoia and delusions. These symptoms represent a change from baseline from which the patient cannot be reasonably expected to improve with current level of care. The patient is requesting voluntary inpatient psychiatric hospitalization for safety and stabilization of psychiatric symptoms.  h/o schizophrenia vs schizoaffective d/o    Plan:  Continue current meds:   cloZAPine 200 milliGRAM(s) Oral at bedtime  divalproex  milliGRAM(s) Oral daily  melatonin 3 milliGRAM(s) Oral at bedtime  nicotine - 21 mG/24Hr(s) Patch 1 Patch Transdermal daily  NEW PROBLEMS: COMPLIANCE ISSUES    VPA level IS 14 Suggest that he is not swallowing his typical.  Clozaril level Is 218 and    48 year old single, domiciled in [place] housing, unemployed, on disability. PPHx of schizophrenia, anxiety multiple prior admissions last in 2012 at [facility]. Was previously on Clozaril 50 mg, doing well aside from feeling tired, changed psychiatrist in July and is now on Depakote and Seroquel, reports symptoms have worsened on this regimen. PMHx of HTN, HDL and restless leg who self presents to hospital for psychiatric evaluation.  Patient presents with Schizoaffective disorder with exacerbation of symptoms since recent medication change. No SI/HI. +AH, not command in nature. +Vague paranoia and delusions. These symptoms represent a change from baseline from which the patient cannot be reasonably expected to improve with current level of care. The patient is requesting voluntary inpatient psychiatric hospitalization for safety and stabilization of psychiatric symptoms.  h/o schizophrenia vs schizoaffective d/o    Plan:  Continue current meds:   cloZAPine 200 milliGRAM(s) Oral at bedtime  divalproex  milliGRAM(s) Oral daily  melatonin 3 milliGRAM(s) Oral at bedtime  nicotine - 21 mG/24Hr(s) Patch 1 Patch Transdermal daily  NEW PROBLEMS: COMPLIANCE ISSUES    VPA level IS 14 Suggest that he is not swallowing his typical.  Clozaril level Is 218 and

## 2025-08-14 ENCOUNTER — APPOINTMENT (OUTPATIENT)
Facility: CLINIC | Age: 50
End: 2025-08-14
Payer: MEDICARE

## 2025-08-14 VITALS
OXYGEN SATURATION: 96 % | BODY MASS INDEX: 34.1 KG/M2 | DIASTOLIC BLOOD PRESSURE: 82 MMHG | SYSTOLIC BLOOD PRESSURE: 122 MMHG | HEIGHT: 68 IN | HEART RATE: 97 BPM | WEIGHT: 225 LBS | RESPIRATION RATE: 16 BRPM

## 2025-08-14 DIAGNOSIS — G47.9 SLEEP DISORDER, UNSPECIFIED: ICD-10-CM

## 2025-08-14 DIAGNOSIS — R06.02 SHORTNESS OF BREATH: ICD-10-CM

## 2025-08-14 PROCEDURE — 99204 OFFICE O/P NEW MOD 45 MIN: CPT

## 2025-08-14 PROCEDURE — G2211 COMPLEX E/M VISIT ADD ON: CPT

## 2025-08-14 RX ORDER — ALBUTEROL SULFATE 90 UG/1
108 (90 BASE) INHALANT RESPIRATORY (INHALATION)
Qty: 1 | Refills: 5 | Status: ACTIVE | COMMUNITY
Start: 2025-08-14 | End: 1900-01-01

## 2025-08-14 RX ORDER — FLUTICASONE FUROATE AND VILANTEROL TRIFENATATE 100; 25 UG/1; UG/1
100-25 POWDER RESPIRATORY (INHALATION)
Qty: 1 | Refills: 5 | Status: ACTIVE | COMMUNITY
Start: 2025-08-14 | End: 1900-01-01

## 2025-08-14 RX ORDER — DIVALPROEX SODIUM 125 MG/1
125 TABLET, DELAYED RELEASE ORAL
Refills: 0 | Status: ACTIVE | COMMUNITY

## 2025-08-18 ENCOUNTER — OUTPATIENT (OUTPATIENT)
Dept: OUTPATIENT SERVICES | Facility: HOSPITAL | Age: 50
LOS: 1 days | End: 2025-08-18
Payer: MEDICARE

## 2025-08-18 ENCOUNTER — APPOINTMENT (OUTPATIENT)
Dept: RADIOLOGY | Facility: CLINIC | Age: 50
End: 2025-08-18
Payer: MEDICARE

## 2025-08-18 DIAGNOSIS — R06.02 SHORTNESS OF BREATH: ICD-10-CM

## 2025-08-18 PROCEDURE — 71046 X-RAY EXAM CHEST 2 VIEWS: CPT | Mod: 26

## 2025-08-18 PROCEDURE — 71046 X-RAY EXAM CHEST 2 VIEWS: CPT

## 2025-08-24 ENCOUNTER — OUTPATIENT (OUTPATIENT)
Dept: OUTPATIENT SERVICES | Facility: HOSPITAL | Age: 50
LOS: 1 days | End: 2025-08-24

## 2025-08-24 DIAGNOSIS — G47.33 OBSTRUCTIVE SLEEP APNEA (ADULT) (PEDIATRIC): ICD-10-CM

## 2025-08-24 PROCEDURE — 95810 POLYSOM 6/> YRS 4/> PARAM: CPT

## 2025-08-24 PROCEDURE — 95810 POLYSOM 6/> YRS 4/> PARAM: CPT | Mod: 26
